# Patient Record
Sex: FEMALE | Race: WHITE | Employment: UNEMPLOYED
[De-identification: names, ages, dates, MRNs, and addresses within clinical notes are randomized per-mention and may not be internally consistent; named-entity substitution may affect disease eponyms.]

---

## 2017-06-16 PROBLEM — L72.3 SEBACEOUS CYST: Status: ACTIVE | Noted: 2017-06-16

## 2017-07-10 PROCEDURE — 88304 TISSUE EXAM BY PATHOLOGIST: CPT | Performed by: SURGERY

## 2018-07-21 NOTE — ED INITIAL ASSESSMENT (HPI)
Patient states she has an alcohol addiction. She states her last drink was about 4 ago. She states she had a bottle of wine today.  Patient denies hx of seizures with d/t in past

## 2018-07-21 NOTE — ED NOTES
Patient noted to have bottle of Blima Crofts in her bag by RN, patient belongings removed upon getting to room and placed in Smart Bag

## 2018-07-21 NOTE — ED PROVIDER NOTES
Patient Seen in: [facility] Emergency Department    History   Patient presents with:  Alcohol Intoxication (neurologic)    Stated Complaint: ETOH intoxication    HPI    Ronal Cardona is 20-year-old female who presents to the emergency department for alc Abnormal; Notable for the following:     Blood Urine Small (*)     All other components within normal limits   ETHYL ALCOHOL - Abnormal; Notable for the following:     Ethyl Alcohol 387 (*)     All other components within normal limits   CBC W/ DIFFERENTIA

## 2018-07-21 NOTE — ED NOTES
Per Clark Mendiola RN, pt getting increasingly agitated, wanting to leave. MD notified and Haldol ordered.

## 2018-07-22 NOTE — ED PROVIDER NOTES
Patient awake, alert, ambulated freely about the emergency department. Gait steady. Patient states she does not want to meet with [facility] anymore.   Patient is felt to be clinically stable and will taken over to her home, where her  is able to

## 2018-07-22 NOTE — ED NOTES
Patient speaking clearly. Gait steady with a normal base. Denies any complaints. States she will take an [address] which her  set-up for her. Dr. Adams Like aware. Patient agreed to be assisted out of ED with wheelchair once [facility]  arrives.

## 2018-07-22 NOTE — BH LEVEL OF CARE ASSESSMENT
Level of Care Assessment Note    General Questions  Why are you here?: Pt is a 40 yr old female who arrived to the ER seeking tx for alcohol abuse. Pt states she's been drinking daily for the past year.  Pt reported to the ER RN she had a bottle of wine tod yourself? (past 30 days): No  3. Have you been thinking about how you might kill yourself? (past 30 days): No  4. Have you had these thoughts and had some intention of acting on them? (past 30 days): No  6.  Have you ever done anything, started to do anythi kaylen; pt refused assessment  Appetite Symptoms:  (kaylen; pt refused assessment)  Unplanned Weight Loss:  (kaylen; pt refused assessment)  Unplanned Weight Gain:  (kaylen; pt refused assessment)  History of Eating Disorder:  (kaylen; pt refused assessment)  Active Eati days?:  (kaylen; pt refused assessment)                                              Functional Impairment  Currently Attending School:  [employer]; pt refused assessment)  Employment Status:  [employer]; ismael refused assessment)  Job Issues:  (kaylen; pt refused assessment) insight as evidenced by: pt reports a hx of alcohol abuse  Judgment: Fair  Fair/poor judgment as evidenced by: pt states she's been drinking daily for the past year  Thought Patterns  Clarity/Relevance: Coherent;Relevant to topic  Flow: Organized  Content:

## 2018-11-12 PROCEDURE — 81025 URINE PREGNANCY TEST: CPT | Performed by: OTHER

## 2018-11-12 PROCEDURE — 81003 URINALYSIS AUTO W/O SCOPE: CPT | Performed by: OTHER

## 2018-11-13 ENCOUNTER — LAB REQUISITION (OUTPATIENT)
Dept: ADMINISTRATIVE | Age: 38
End: 2018-11-13
Payer: COMMERCIAL

## 2018-11-13 DIAGNOSIS — R45.86 MOOD CHANGES: ICD-10-CM

## 2018-11-13 PROCEDURE — 80307 DRUG TEST PRSMV CHEM ANLYZR: CPT | Performed by: OTHER

## 2018-11-13 PROCEDURE — 85025 COMPLETE CBC W/AUTO DIFF WBC: CPT | Performed by: OTHER

## 2018-11-13 PROCEDURE — 80053 COMPREHEN METABOLIC PANEL: CPT | Performed by: OTHER

## 2018-11-13 PROCEDURE — 84443 ASSAY THYROID STIM HORMONE: CPT | Performed by: OTHER

## 2018-11-13 PROCEDURE — 36415 COLL VENOUS BLD VENIPUNCTURE: CPT | Performed by: OTHER

## 2019-02-15 ENCOUNTER — HOSPITAL (OUTPATIENT)
Dept: OTHER | Age: 39
End: 2019-02-15
Attending: EMERGENCY MEDICINE

## 2019-02-15 LAB
ALBUMIN SERPL-MCNC: 4.1 GM/DL (ref 3.6–5.1)
ALP SERPL-CCNC: 79 UNIT/L (ref 45–117)
ALT SERPL-CCNC: 76 UNIT/L
ANALYZER ANC (IANC): ABNORMAL
ANION GAP SERPL CALC-SCNC: 20 MMOL/L (ref 10–20)
APAP SERPL-MCNC: <2 MCG/ML (ref 10–30)
AST SERPL-CCNC: 111 UNIT/L
BASOPHILS # BLD: 0.1 THOUSAND/MCL (ref 0–0.3)
BASOPHILS NFR BLD: 2 %
BILIRUB CONJ SERPL-MCNC: 0.1 MG/DL (ref 0–0.2)
BILIRUB SERPL-MCNC: 0.4 MG/DL (ref 0.2–1)
BUN SERPL-MCNC: 4 MG/DL (ref 6–20)
BUN/CREAT SERPL: 8 (ref 7–25)
CALCIUM SERPL-MCNC: 8.7 MG/DL (ref 8.4–10.2)
CHLORIDE: 102 MMOL/L (ref 98–107)
CO2 SERPL-SCNC: 26 MMOL/L (ref 21–32)
CREAT SERPL-MCNC: 0.51 MG/DL (ref 0.51–0.95)
DIFFERENTIAL METHOD BLD: ABNORMAL
EOSINOPHIL # BLD: 0 THOUSAND/MCL (ref 0.1–0.5)
EOSINOPHIL NFR BLD: 0 %
ERYTHROCYTE [DISTWIDTH] IN BLOOD: 17.1 % (ref 11–15)
ETHANOL SERPL-MCNC: 432 MG/DL
GLUCOSE SERPL-MCNC: 107 MG/DL (ref 65–99)
HEMATOCRIT: 41.3 % (ref 36–46.5)
HGB BLD-MCNC: 14.2 GM/DL (ref 12–15.5)
IMM GRANULOCYTES # BLD AUTO: 0 THOUSAND/MCL (ref 0–0.2)
IMM GRANULOCYTES NFR BLD: 0 %
LYMPHOCYTES # BLD: 2 THOUSAND/MCL (ref 1–4.8)
LYMPHOCYTES NFR BLD: 35 %
MCH RBC QN AUTO: 32.3 PG (ref 26–34)
MCHC RBC AUTO-ENTMCNC: 34.4 GM/DL (ref 32–36.5)
MCV RBC AUTO: 94.1 FL (ref 78–100)
MONOCYTES # BLD: 0.3 THOUSAND/MCL (ref 0.3–0.9)
MONOCYTES NFR BLD: 6 %
NEUTROPHILS # BLD: 3.2 THOUSAND/MCL (ref 1.8–7.7)
NEUTROPHILS NFR BLD: 57 %
NEUTS SEG NFR BLD: ABNORMAL %
NRBC (NRBCRE): 0 /100 WBC
PLATELET # BLD: 219 THOUSAND/MCL (ref 140–450)
POTASSIUM SERPL-SCNC: 3.8 MMOL/L (ref 3.4–5.1)
PROT SERPL-MCNC: 7.9 GM/DL (ref 6.4–8.2)
RBC # BLD: 4.39 MILLION/MCL (ref 4–5.2)
SALICYLATES SERPL-MCNC: <2.8 MG/DL
SODIUM SERPL-SCNC: 144 MMOL/L (ref 135–145)
WBC # BLD: 5.7 THOUSAND/MCL (ref 4.2–11)

## 2019-02-16 LAB — ETHANOL SERPL-MCNC: 86 MG/DL

## 2019-06-01 ENCOUNTER — HOSPITAL (OUTPATIENT)
Dept: OTHER | Age: 39
End: 2019-06-01
Attending: EMERGENCY MEDICINE

## 2019-06-01 LAB — GLUCOSE BLDC GLUCOMTR-MCNC: 126 MG/DL (ref 65–99)

## 2019-06-01 PROCEDURE — 99283 EMERGENCY DEPT VISIT LOW MDM: CPT | Performed by: EMERGENCY MEDICINE

## 2019-07-21 ENCOUNTER — HOSPITAL (OUTPATIENT)
Dept: OTHER | Age: 39
End: 2019-07-21

## 2019-07-21 LAB
ANALYZER ANC (IANC): ABNORMAL
ANION GAP SERPL CALC-SCNC: 17 MMOL/L (ref 10–20)
BASOPHILS # BLD: 0.1 K/MCL (ref 0–0.3)
BASOPHILS NFR BLD: 1 %
BUN SERPL-MCNC: 7 MG/DL (ref 6–20)
BUN/CREAT SERPL: 9 (ref 7–25)
CALCIUM SERPL-MCNC: 8.4 MG/DL (ref 8.4–10.2)
CHLORIDE SERPL-SCNC: 108 MMOL/L (ref 98–107)
CO2 SERPL-SCNC: 25 MMOL/L (ref 21–32)
CREAT SERPL-MCNC: 0.74 MG/DL (ref 0.51–0.95)
DIFFERENTIAL METHOD BLD: ABNORMAL
EOSINOPHIL # BLD: 0 K/MCL (ref 0.1–0.5)
EOSINOPHIL NFR BLD: 0 %
ERYTHROCYTE [DISTWIDTH] IN BLOOD: 13.6 % (ref 11–15)
ETHANOL SERPL-MCNC: 319 MG/DL
GLUCOSE SERPL-MCNC: 101 MG/DL (ref 65–99)
HCT VFR BLD CALC: 40.6 % (ref 36–46.5)
HGB BLD-MCNC: 13.6 G/DL (ref 12–15.5)
IMM GRANULOCYTES # BLD AUTO: 0 K/MCL (ref 0–0.2)
IMM GRANULOCYTES NFR BLD: 0 %
LYMPHOCYTES # BLD: 2.4 K/MCL (ref 1–4.8)
LYMPHOCYTES NFR BLD: 26 %
MCH RBC QN AUTO: 30.1 PG (ref 26–34)
MCHC RBC AUTO-ENTMCNC: 33.5 G/DL (ref 32–36.5)
MCV RBC AUTO: 89.8 FL (ref 78–100)
MONOCYTES # BLD: 0.3 K/MCL (ref 0.3–0.9)
MONOCYTES NFR BLD: 4 %
NEUTROPHILS # BLD: 6.5 K/MCL (ref 1.8–7.7)
NEUTROPHILS NFR BLD: 69 %
NEUTS SEG NFR BLD: ABNORMAL %
NRBC (NRBCRE): 0 /100 WBC
PLATELET # BLD: 286 K/MCL (ref 140–450)
POTASSIUM SERPL-SCNC: 3.7 MMOL/L (ref 3.4–5.1)
RBC # BLD: 4.52 MIL/MCL (ref 4–5.2)
SODIUM SERPL-SCNC: 146 MMOL/L (ref 135–145)
WBC # BLD: 9.3 K/MCL (ref 4.2–11)

## 2019-07-22 PROCEDURE — 99283 EMERGENCY DEPT VISIT LOW MDM: CPT | Performed by: EMERGENCY MEDICINE

## 2019-09-21 ENCOUNTER — HOSPITAL (OUTPATIENT)
Dept: OTHER | Age: 39
End: 2019-09-21

## 2019-09-21 PROCEDURE — 99283 EMERGENCY DEPT VISIT LOW MDM: CPT | Performed by: EMERGENCY MEDICINE

## 2019-10-22 ENCOUNTER — HOSPITAL (OUTPATIENT)
Dept: OTHER | Age: 39
End: 2019-10-22

## 2019-10-22 LAB
ALBUMIN SERPL-MCNC: 4.1 G/DL (ref 3.6–5.1)
ALBUMIN/GLOB SERPL: 1 {RATIO} (ref 1–2.4)
ALP SERPL-CCNC: 104 UNITS/L (ref 45–117)
ALT SERPL-CCNC: 31 UNITS/L
AMPHETAMINES UR QL SCN>500 NG/ML: ABNORMAL
AMPHETAMINES UR QL SCN>500 NG/ML: NEGATIVE
ANALYZER ANC (IANC): ABNORMAL
ANION GAP SERPL CALC-SCNC: 23 MMOL/L (ref 10–20)
AST SERPL-CCNC: 48 UNITS/L
BARBITURATES UR QL SCN>200 NG/ML: ABNORMAL
BARBITURATES UR QL SCN>200 NG/ML: NEGATIVE
BASOPHILS # BLD: 0.2 K/MCL (ref 0–0.3)
BASOPHILS NFR BLD: 2 %
BENZODIAZ UR QL SCN>200 NG/ML: ABNORMAL
BENZODIAZ UR QL SCN>200 NG/ML: POSITIVE
BILIRUB SERPL-MCNC: 0.2 MG/DL (ref 0.2–1)
BUN SERPL-MCNC: 5 MG/DL (ref 6–20)
BUN/CREAT SERPL: 8 (ref 7–25)
BZE UR QL SCN>150 NG/ML: ABNORMAL
BZE UR QL SCN>150 NG/ML: NEGATIVE
CALCIUM SERPL-MCNC: 8.7 MG/DL (ref 8.4–10.2)
CANNABINOIDS UR QL SCN>50 NG/ML: ABNORMAL
CANNABINOIDS UR QL SCN>50 NG/ML: NEGATIVE
CHLORIDE SERPL-SCNC: 100 MMOL/L (ref 98–107)
CO2 SERPL-SCNC: 20 MMOL/L (ref 21–32)
CREAT SERPL-MCNC: 0.62 MG/DL (ref 0.51–0.95)
DIFFERENTIAL METHOD BLD: ABNORMAL
EOSINOPHIL # BLD: 0 K/MCL (ref 0.1–0.5)
EOSINOPHIL NFR BLD: 0 %
ERYTHROCYTE [DISTWIDTH] IN BLOOD: 14.6 % (ref 11–15)
ETHANOL SERPL-MCNC: 412 MG/DL
GLOBULIN SER-MCNC: 4.3 G/DL (ref 2–4)
GLUCOSE SERPL-MCNC: 121 MG/DL (ref 65–99)
HCG SERPL-ACNC: <2 MUNITS/ML
HCG SERPL-ACNC: NORMAL M[IU]/ML
HCT VFR BLD CALC: 48.5 % (ref 36–46.5)
HGB BLD-MCNC: 16.4 G/DL (ref 12–15.5)
IMM GRANULOCYTES # BLD AUTO: 0 K/MCL (ref 0–0.2)
IMM GRANULOCYTES NFR BLD: 0 %
LYMPHOCYTES # BLD: 2.8 K/MCL (ref 1–4.8)
LYMPHOCYTES NFR BLD: 26 %
MCH RBC QN AUTO: 31.5 PG (ref 26–34)
MCHC RBC AUTO-ENTMCNC: 33.8 G/DL (ref 32–36.5)
MCV RBC AUTO: 93.1 FL (ref 78–100)
METHADONE UR QL SCN>300 NG/ML: ABNORMAL
METHADONE UR QL SCN>300 NG/ML: NEGATIVE NG/ML
MONOCYTES # BLD: 0.6 K/MCL (ref 0.3–0.9)
MONOCYTES NFR BLD: 5 %
NEUTROPHILS # BLD: 7.4 K/MCL (ref 1.8–7.7)
NEUTROPHILS NFR BLD: 67 %
NEUTS SEG NFR BLD: ABNORMAL %
NRBC (NRBCRE): 0 /100 WBC
OPIATES UR QL SCN>300 NG/ML: ABNORMAL
OPIATES UR QL SCN>300 NG/ML: NEGATIVE
PCP UR QL SCN>25 NG/ML: ABNORMAL
PCP UR QL SCN>25 NG/ML: ABNORMAL
PCP UR QL SCN>25 NG/ML: NEGATIVE
PLATELET # BLD: 376 K/MCL (ref 140–450)
POTASSIUM SERPL-SCNC: 5 MMOL/L (ref 3.4–5.1)
POTASSIUM SERPL-SCNC: ABNORMAL MMOL/L
PROT SERPL-MCNC: 8.4 G/DL (ref 6.4–8.2)
RBC # BLD: 5.21 MIL/MCL (ref 4–5.2)
SODIUM SERPL-SCNC: 138 MMOL/L (ref 135–145)
WBC # BLD: 11 K/MCL (ref 4.2–11)

## 2019-10-22 PROCEDURE — 99284 EMERGENCY DEPT VISIT MOD MDM: CPT | Performed by: EMERGENCY MEDICINE

## 2020-01-07 ENCOUNTER — HOSPITAL (OUTPATIENT)
Dept: OTHER | Age: 40
End: 2020-01-07

## 2020-01-07 LAB
ALBUMIN SERPL-MCNC: 4.2 G/DL (ref 3.6–5.1)
ALBUMIN/GLOB SERPL: 1.1 {RATIO} (ref 1–2.4)
ALP SERPL-CCNC: 81 UNITS/L (ref 45–117)
ALT SERPL-CCNC: 51 UNITS/L
ANALYZER ANC (IANC): NORMAL
ANION GAP SERPL CALC-SCNC: 18 MMOL/L (ref 10–20)
AST SERPL-CCNC: 52 UNITS/L
BASOPHILS # BLD: 0.1 K/MCL (ref 0–0.3)
BASOPHILS NFR BLD: 1 %
BILIRUB SERPL-MCNC: 0.2 MG/DL (ref 0.2–1)
BUN SERPL-MCNC: 3 MG/DL (ref 6–20)
BUN/CREAT SERPL: 6 (ref 7–25)
CALCIUM SERPL-MCNC: 8.8 MG/DL (ref 8.4–10.2)
CHLORIDE SERPL-SCNC: 106 MMOL/L (ref 98–107)
CO2 SERPL-SCNC: 27 MMOL/L (ref 21–32)
CREAT SERPL-MCNC: 0.51 MG/DL (ref 0.51–0.95)
DIFFERENTIAL METHOD BLD: NORMAL
EOSINOPHIL # BLD: 0.1 K/MCL (ref 0.1–0.5)
EOSINOPHIL NFR BLD: 1 %
ERYTHROCYTE [DISTWIDTH] IN BLOOD: 14.8 % (ref 11–15)
ETHANOL SERPL-MCNC: 392 MG/DL
GLOBULIN SER-MCNC: 3.9 G/DL (ref 2–4)
GLUCOSE BLDC GLUCOMTR-MCNC: 106 MG/DL (ref 70–99)
GLUCOSE BLDC GLUCOMTR-MCNC: 248 MG/DL (ref 70–99)
GLUCOSE BLDC GLUCOMTR-MCNC: 85 MG/DL (ref 70–99)
GLUCOSE BLDC GLUCOMTR-MCNC: 85 MG/DL (ref 70–99)
GLUCOSE BLDC GLUCOMTR-MCNC: 89 MG/DL (ref 70–99)
GLUCOSE BLDC GLUCOMTR-MCNC: ABNORMAL MG/DL
GLUCOSE SERPL-MCNC: 129 MG/DL (ref 65–99)
HCT VFR BLD CALC: 41.2 % (ref 36–46.5)
HGB BLD-MCNC: 13.9 G/DL (ref 12–15.5)
IMM GRANULOCYTES # BLD AUTO: 0 K/MCL (ref 0–0.2)
IMM GRANULOCYTES NFR BLD: 0 %
LYMPHOCYTES # BLD: 2.1 K/MCL (ref 1–4.8)
LYMPHOCYTES NFR BLD: 35 %
MAGNESIUM SERPL-MCNC: 2.3 MG/DL (ref 1.7–2.4)
MCH RBC QN AUTO: 31.3 PG (ref 26–34)
MCHC RBC AUTO-ENTMCNC: 33.7 G/DL (ref 32–36.5)
MCV RBC AUTO: 92.8 FL (ref 78–100)
MONOCYTES # BLD: 0.3 K/MCL (ref 0.3–0.9)
MONOCYTES NFR BLD: 5 %
NEUTROPHILS # BLD: 3.5 K/MCL (ref 1.8–7.7)
NEUTROPHILS NFR BLD: 58 %
NEUTS SEG NFR BLD: NORMAL %
NRBC (NRBCRE): 0 /100 WBC
PLATELET # BLD: 274 K/MCL (ref 140–450)
POTASSIUM SERPL-SCNC: 4.2 MMOL/L (ref 3.4–5.1)
PROT SERPL-MCNC: 8.1 G/DL (ref 6.4–8.2)
RBC # BLD: 4.44 MIL/MCL (ref 4–5.2)
SODIUM SERPL-SCNC: 147 MMOL/L (ref 135–145)
WBC # BLD: 6 K/MCL (ref 4.2–11)

## 2020-01-07 PROCEDURE — 99284 EMERGENCY DEPT VISIT MOD MDM: CPT | Performed by: EMERGENCY MEDICINE

## 2020-01-08 ENCOUNTER — HOSPITAL (OUTPATIENT)
Dept: OTHER | Age: 40
End: 2020-01-08

## 2020-01-08 LAB
ALBUMIN SERPL-MCNC: 4.1 G/DL (ref 3.6–5.1)
ALBUMIN/GLOB SERPL: 1.1 {RATIO} (ref 1–2.4)
ALP SERPL-CCNC: 85 UNITS/L (ref 45–117)
ALT SERPL-CCNC: 48 UNITS/L
AMMONIA PLAS-SCNC: <10 MCMOL/L
ANALYZER ANC (IANC): ABNORMAL
ANION GAP SERPL CALC-SCNC: 21 MMOL/L (ref 10–20)
APAP SERPL-MCNC: <2 MCG/ML (ref 10–30)
AST SERPL-CCNC: 42 UNITS/L
BASE DEFICIT BLDA-SCNC: ABNORMAL MMOL/L
BASE EXCESS BLDA CALC-SCNC: 0 MMOL/L (ref 0–3)
BASOPHILS # BLD: 0.1 K/MCL (ref 0–0.3)
BASOPHILS NFR BLD: 1 %
BDY SITE: ABNORMAL
BILIRUB SERPL-MCNC: 0.2 MG/DL (ref 0.2–1)
BODY TEMPERATURE: 37 DEGREES
BUN SERPL-MCNC: 3 MG/DL (ref 6–20)
BUN/CREAT SERPL: 5 (ref 7–25)
CA-I BLD ISE-SCNC: 1.1 MMOL/L (ref 1.15–1.29)
CA-I BLDA-SCNC: 20 % (ref 15–23)
CALCIUM SERPL-MCNC: 9.1 MG/DL (ref 8.4–10.2)
CHLORIDE BLD-SCNC: 105 MMOL/L (ref 98–107)
CHLORIDE SERPL-SCNC: 100 MMOL/L (ref 98–107)
CO2 SERPL-SCNC: 24 MMOL/L (ref 21–32)
COHGB MFR BLD: 0.9 %
CONDITION-RC: ABNORMAL
CONDITION: ABNORMAL
CREAT SERPL-MCNC: 0.56 MG/DL (ref 0.51–0.95)
DIFFERENTIAL METHOD BLD: ABNORMAL
EOSINOPHIL # BLD: 0 K/MCL (ref 0.1–0.5)
EOSINOPHIL NFR BLD: 0 %
ERYTHROCYTE [DISTWIDTH] IN BLOOD: 15 % (ref 11–15)
ETHANOL SERPL-MCNC: 441 MG/DL
GLOBULIN SER-MCNC: 3.8 G/DL (ref 2–4)
GLUCOSE BLD-MCNC: 122 MG/DL (ref 65–99)
GLUCOSE BLDC GLUCOMTR-MCNC: 124 MG/DL (ref 70–99)
GLUCOSE SERPL-MCNC: 133 MG/DL (ref 65–99)
HCG SERPL QL: NEGATIVE
HCO3 BLDA-SCNC: 25 MMOL/L (ref 22–28)
HCT VFR BLD CALC: 42.9 % (ref 36–46.5)
HGB BLD-MCNC: 14.1 G/DL (ref 12–15.5)
HGB BLD-MCNC: 14.3 G/DL (ref 12–15.5)
HOROWITZ INDEX BLD+IHG-RTO: 28 %
IMM GRANULOCYTES # BLD AUTO: 0 K/MCL (ref 0–0.2)
IMM GRANULOCYTES NFR BLD: 0 %
LACTATE BLDA-MCNC: 5.2 MMOL/L
LACTATE BLDV-SCNC: 3.9 MMOL/L (ref 0–2)
LACTATE BLDV-SCNC: 5.9 MMOL/L (ref 0–2)
LACTATE BLDV-SCNC: ABNORMAL MMOL/L
LYMPHOCYTES # BLD: 1.8 K/MCL (ref 1–4.8)
LYMPHOCYTES NFR BLD: 26 %
MCH RBC QN AUTO: 31.3 PG (ref 26–34)
MCHC RBC AUTO-ENTMCNC: 33.3 G/DL (ref 32–36.5)
MCV RBC AUTO: 93.9 FL (ref 78–100)
METHGB MFR BLD: 0.6 %
MONOCYTES # BLD: 0.5 K/MCL (ref 0.3–0.9)
MONOCYTES NFR BLD: 7 %
NEUTROPHILS # BLD: 4.6 K/MCL (ref 1.8–7.7)
NEUTROPHILS NFR BLD: 66 %
NEUTS SEG NFR BLD: ABNORMAL %
NRBC (NRBCRE): 0 /100 WBC
OXYHGB MFR BLD: 97.7 % (ref 94–98)
PAO2 / FIO2 RATIO (RPFR): 635 (ref 300–500)
PCO2 BLDA: 42 MM HG (ref 32–45)
PH BLDA: 7.39 UNITS (ref 7.35–7.45)
PLATELET # BLD: 280 K/MCL (ref 140–450)
PO2 BLDA: 178 MM HG (ref 83–108)
POTASSIUM BLD-SCNC: 4.2 MMOL/L (ref 3.4–5.1)
POTASSIUM SERPL-SCNC: 4.1 MMOL/L (ref 3.4–5.1)
PROT SERPL-MCNC: 7.9 G/DL (ref 6.4–8.2)
RBC # BLD: 4.57 MIL/MCL (ref 4–5.2)
SALICYLATES SERPL-MCNC: <2.8 MG/DL
SAO2 % BLDA: 99 % (ref 95–99)
SODIUM BLD-SCNC: 147 MMOL/L (ref 135–145)
SODIUM SERPL-SCNC: 141 MMOL/L (ref 135–145)
WBC # BLD: 7.1 K/MCL (ref 4.2–11)

## 2020-01-08 PROCEDURE — 99285 EMERGENCY DEPT VISIT HI MDM: CPT | Performed by: EMERGENCY MEDICINE

## 2020-01-08 PROCEDURE — 99220 INITIAL OBSERVATION CARE,LEVL III: CPT | Performed by: INTERNAL MEDICINE

## 2020-01-09 LAB
ALBUMIN SERPL-MCNC: 3.2 G/DL (ref 3.6–5.1)
ALBUMIN/GLOB SERPL: 1 {RATIO} (ref 1–2.4)
ALP SERPL-CCNC: 69 UNITS/L (ref 45–117)
ALT SERPL-CCNC: 37 UNITS/L
AMORPH SED URNS QL MICRO: ABNORMAL
AMPHETAMINES UR QL SCN>500 NG/ML: ABNORMAL
AMPHETAMINES UR QL SCN>500 NG/ML: NEGATIVE
ANALYZER ANC (IANC): ABNORMAL
ANION GAP SERPL CALC-SCNC: 15 MMOL/L (ref 10–20)
APPEARANCE UR: ABNORMAL
AST SERPL-CCNC: 30 UNITS/L
BACTERIA #/AREA URNS HPF: ABNORMAL /HPF
BARBITURATES UR QL SCN>200 NG/ML: ABNORMAL
BARBITURATES UR QL SCN>200 NG/ML: NEGATIVE
BASOPHILS # BLD: 0.1 K/MCL (ref 0–0.3)
BASOPHILS NFR BLD: 1 %
BENZODIAZ UR QL SCN>200 NG/ML: ABNORMAL
BENZODIAZ UR QL SCN>200 NG/ML: POSITIVE
BILIRUB SERPL-MCNC: 0.5 MG/DL (ref 0.2–1)
BILIRUB UR QL: NEGATIVE
BUN SERPL-MCNC: 3 MG/DL (ref 6–20)
BUN/CREAT SERPL: 7 (ref 7–25)
BZE UR QL SCN>150 NG/ML: ABNORMAL
BZE UR QL SCN>150 NG/ML: NEGATIVE
CALCIUM SERPL-MCNC: 8.5 MG/DL (ref 8.4–10.2)
CANNABINOIDS UR QL SCN>50 NG/ML: ABNORMAL
CANNABINOIDS UR QL SCN>50 NG/ML: NEGATIVE
CAOX CRY URNS QL MICRO: ABNORMAL
CHLORIDE SERPL-SCNC: 103 MMOL/L (ref 98–107)
CO2 SERPL-SCNC: 26 MMOL/L (ref 21–32)
COLOR UR: YELLOW
CREAT SERPL-MCNC: 0.46 MG/DL (ref 0.51–0.95)
DIFFERENTIAL METHOD BLD: ABNORMAL
EOSINOPHIL # BLD: 0.1 K/MCL (ref 0.1–0.5)
EOSINOPHIL NFR BLD: 1 %
EPITH CASTS #/AREA URNS LPF: ABNORMAL /[LPF]
ERYTHROCYTE [DISTWIDTH] IN BLOOD: 14.9 % (ref 11–15)
FATTY CASTS #/AREA URNS LPF: ABNORMAL /[LPF]
GLOBULIN SER-MCNC: 3.1 G/DL (ref 2–4)
GLUCOSE SERPL-MCNC: 77 MG/DL (ref 65–99)
GLUCOSE UR-MCNC: NEGATIVE MG/DL
GRAN CASTS #/AREA URNS LPF: ABNORMAL /[LPF]
HCT VFR BLD CALC: 35.4 % (ref 36–46.5)
HGB BLD-MCNC: 11.9 G/DL (ref 12–15.5)
HGB UR QL: ABNORMAL
HYALINE CASTS #/AREA URNS LPF: ABNORMAL /LPF (ref 0–5)
IMM GRANULOCYTES # BLD AUTO: 0 K/MCL (ref 0–0.2)
IMM GRANULOCYTES NFR BLD: 0 %
KETONES UR-MCNC: NEGATIVE MG/DL
LACTATE BLDV-SCNC: 1.1 MMOL/L (ref 0–2)
LEUKOCYTE ESTERASE UR QL STRIP: ABNORMAL
LEUKOCYTE ESTERASE UR QL STRIP: ABNORMAL
LYMPHOCYTES # BLD: 2.2 K/MCL (ref 1–4.8)
LYMPHOCYTES NFR BLD: 31 %
MAGNESIUM SERPL-MCNC: 1.7 MG/DL (ref 1.7–2.4)
MCH RBC QN AUTO: 31.6 PG (ref 26–34)
MCHC RBC AUTO-ENTMCNC: 33.6 G/DL (ref 32–36.5)
MCV RBC AUTO: 93.9 FL (ref 78–100)
METHADONE UR QL SCN>300 NG/ML: ABNORMAL
METHADONE UR QL SCN>300 NG/ML: NEGATIVE NG/ML
MICROSCOPIC (MT): ABNORMAL
MIXED CELL CASTS #/AREA URNS LPF: ABNORMAL /[LPF]
MONOCYTES # BLD: 0.5 K/MCL (ref 0.3–0.9)
MONOCYTES NFR BLD: 7 %
MUCOUS THREADS URNS QL MICRO: PRESENT
NEUTROPHILS # BLD: 4.3 K/MCL (ref 1.8–7.7)
NEUTROPHILS NFR BLD: 60 %
NEUTS SEG NFR BLD: ABNORMAL %
NITRITE UR QL: ABNORMAL
NITRITE UR QL: POSITIVE
NRBC (NRBCRE): 0 /100 WBC
OPIATES UR QL SCN>300 NG/ML: ABNORMAL
OPIATES UR QL SCN>300 NG/ML: NEGATIVE
PCP UR QL SCN>25 NG/ML: ABNORMAL
PCP UR QL SCN>25 NG/ML: ABNORMAL
PCP UR QL SCN>25 NG/ML: NEGATIVE
PH UR: 6 UNITS (ref 5–7)
PLATELET # BLD: 200 K/MCL (ref 140–450)
POTASSIUM SERPL-SCNC: 3.7 MMOL/L (ref 3.4–5.1)
PROT SERPL-MCNC: 6.3 G/DL (ref 6.4–8.2)
PROT UR QL: NEGATIVE MG/DL
RBC # BLD: 3.77 MIL/MCL (ref 4–5.2)
RBC #/AREA URNS HPF: ABNORMAL /HPF (ref 0–2)
RBC CASTS #/AREA URNS LPF: ABNORMAL /[LPF]
RENAL EPI CELLS #/AREA URNS HPF: ABNORMAL /[HPF]
SODIUM SERPL-SCNC: 140 MMOL/L (ref 135–145)
SP GR UR: 1.01 (ref 1–1.03)
SPECIMEN SOURCE: ABNORMAL
SPERM URNS QL MICRO: ABNORMAL
SQUAMOUS #/AREA URNS HPF: ABNORMAL /HPF (ref 0–5)
T VAGINALIS URNS QL MICRO: ABNORMAL
TRI-PHOS CRY URNS QL MICRO: ABNORMAL
URATE CRY URNS QL MICRO: ABNORMAL
URNS CMNT MICRO: ABNORMAL
UROBILINOGEN UR QL: 0.2 MG/DL (ref 0–1)
WAXY CASTS #/AREA URNS LPF: ABNORMAL /[LPF]
WBC # BLD: 7.1 K/MCL (ref 4.2–11)
WBC #/AREA URNS HPF: >100 /HPF (ref 0–5)
WBC #/AREA URNS HPF: ABNORMAL /[HPF]
WBC CASTS #/AREA URNS LPF: ABNORMAL /[LPF]
YEAST HYPHAE URNS QL MICRO: ABNORMAL
YEAST URNS QL MICRO: ABNORMAL

## 2020-01-09 PROCEDURE — 99226 SUBSEQUENT OBSERVATION CARE III: CPT | Performed by: INTERNAL MEDICINE

## 2020-01-10 LAB
ANALYZER ANC (IANC): ABNORMAL
ANION GAP SERPL CALC-SCNC: 10 MMOL/L (ref 10–20)
BASOPHILS # BLD: 0.1 K/MCL (ref 0–0.3)
BASOPHILS NFR BLD: 1 %
BUN SERPL-MCNC: 4 MG/DL (ref 6–20)
BUN/CREAT SERPL: 8 (ref 7–25)
CALCIUM SERPL-MCNC: 8.7 MG/DL (ref 8.4–10.2)
CHLORIDE SERPL-SCNC: 103 MMOL/L (ref 98–107)
CO2 SERPL-SCNC: 28 MMOL/L (ref 21–32)
CREAT SERPL-MCNC: 0.5 MG/DL (ref 0.51–0.95)
DIFFERENTIAL METHOD BLD: ABNORMAL
EOSINOPHIL # BLD: 0.2 K/MCL (ref 0.1–0.5)
EOSINOPHIL NFR BLD: 2 %
ERYTHROCYTE [DISTWIDTH] IN BLOOD: 14.4 % (ref 11–15)
GLUCOSE SERPL-MCNC: 108 MG/DL (ref 65–99)
HCT VFR BLD CALC: 34.4 % (ref 36–46.5)
HGB BLD-MCNC: 11.4 G/DL (ref 12–15.5)
IMM GRANULOCYTES # BLD AUTO: 0 K/MCL (ref 0–0.2)
IMM GRANULOCYTES NFR BLD: 0 %
LEVETIRACETAM SERPL-MCNC: 6.4 MCG/ML (ref 6–46)
LEVETIRACETAM SERPL-MCNC: NORMAL UG/ML
LYMPHOCYTES # BLD: 1.4 K/MCL (ref 1–4.8)
LYMPHOCYTES NFR BLD: 18 %
MCH RBC QN AUTO: 31.7 PG (ref 26–34)
MCHC RBC AUTO-ENTMCNC: 33.1 G/DL (ref 32–36.5)
MCV RBC AUTO: 95.6 FL (ref 78–100)
MONOCYTES # BLD: 0.6 K/MCL (ref 0.3–0.9)
MONOCYTES NFR BLD: 7 %
NEUTROPHILS # BLD: 5.6 K/MCL (ref 1.8–7.7)
NEUTROPHILS NFR BLD: 72 %
NEUTS SEG NFR BLD: ABNORMAL %
NRBC (NRBCRE): 0 /100 WBC
PLATELET # BLD: 188 K/MCL (ref 140–450)
POTASSIUM SERPL-SCNC: 3.4 MMOL/L (ref 3.4–5.1)
RBC # BLD: 3.6 MIL/MCL (ref 4–5.2)
SODIUM SERPL-SCNC: 138 MMOL/L (ref 135–145)
WBC # BLD: 7.8 K/MCL (ref 4.2–11)

## 2020-01-10 PROCEDURE — 99217 OBSERVATION CARE DISCHARGE: CPT | Performed by: INTERNAL MEDICINE

## 2020-01-12 LAB
BACTERIA UR CULT: ABNORMAL
ORGANISM: ABNORMAL
ORGANISM: ABNORMAL

## 2020-05-11 ENCOUNTER — HOSPITAL (OUTPATIENT)
Dept: OTHER | Age: 40
End: 2020-05-11

## 2020-05-11 LAB
GLUCOSE BLDC GLUCOMTR-MCNC: 84 MG/DL (ref 70–99)
HCG POINT OF CARE (5HGRST): NEGATIVE

## 2020-05-11 PROCEDURE — 99283 EMERGENCY DEPT VISIT LOW MDM: CPT | Performed by: EMERGENCY MEDICINE

## 2020-05-13 ENCOUNTER — HOSPITAL (OUTPATIENT)
Dept: OTHER | Age: 40
End: 2020-05-13

## 2020-05-13 LAB — ETHANOL SERPL-MCNC: 575 MG/DL

## 2020-05-13 PROCEDURE — 99284 EMERGENCY DEPT VISIT MOD MDM: CPT | Performed by: EMERGENCY MEDICINE

## 2020-05-14 LAB
ALBUMIN SERPL-MCNC: 3.2 G/DL (ref 3.6–5.1)
ALBUMIN/GLOB SERPL: 0.9 {RATIO} (ref 1–2.4)
ALP SERPL-CCNC: 58 UNITS/L (ref 45–117)
ALT SERPL-CCNC: 29 UNITS/L
ANALYZER ANC (IANC): ABNORMAL
ANION GAP SERPL CALC-SCNC: 13 MMOL/L (ref 10–20)
AST SERPL-CCNC: 24 UNITS/L
BASOPHILS # BLD: 0.1 K/MCL (ref 0–0.3)
BASOPHILS NFR BLD: 1 %
BILIRUB SERPL-MCNC: 0.1 MG/DL (ref 0.2–1)
BUN SERPL-MCNC: 5 MG/DL (ref 6–20)
BUN/CREAT SERPL: 13 (ref 7–25)
CALCIUM SERPL-MCNC: 7.5 MG/DL (ref 8.4–10.2)
CHLORIDE SERPL-SCNC: 108 MMOL/L (ref 98–107)
CK SERPL-CCNC: 59 UNITS/L (ref 26–192)
CO2 SERPL-SCNC: 29 MMOL/L (ref 21–32)
CREAT SERPL-MCNC: 0.4 MG/DL (ref 0.51–0.95)
CRP SERPL-MCNC: <0.3 MG/DL
DIFFERENTIAL METHOD BLD: ABNORMAL
EOSINOPHIL # BLD: 0.1 K/MCL (ref 0.1–0.5)
EOSINOPHIL NFR BLD: 1 %
ERYTHROCYTE [DISTWIDTH] IN BLOOD: 16.5 % (ref 11–15)
FERRITIN SERPL-MCNC: 172 NG/ML (ref 8–252)
GLOBULIN SER-MCNC: 3.5 G/DL (ref 2–4)
GLUCOSE SERPL-MCNC: 101 MG/DL (ref 65–99)
HCT VFR BLD CALC: 37.7 % (ref 36–46.5)
HGB BLD-MCNC: 12.5 G/DL (ref 12–15.5)
IMM GRANULOCYTES # BLD AUTO: 0 K/MCL (ref 0–0.2)
IMM GRANULOCYTES NFR BLD: 0 %
LDH SERPL L TO P-CCNC: 166 UNITS/L (ref 82–240)
LYMPHOCYTES # BLD: 2.3 K/MCL (ref 1–4.8)
LYMPHOCYTES NFR BLD: 41 %
MAGNESIUM SERPL-MCNC: 1.8 MG/DL (ref 1.7–2.4)
MCH RBC QN AUTO: 31.9 PG (ref 26–34)
MCHC RBC AUTO-ENTMCNC: 33.2 G/DL (ref 32–36.5)
MCV RBC AUTO: 96.2 FL (ref 78–100)
MONOCYTES # BLD: 0.2 K/MCL (ref 0.3–0.9)
MONOCYTES NFR BLD: 4 %
NEUTROPHILS # BLD: 2.9 K/MCL (ref 1.8–7.7)
NEUTROPHILS NFR BLD: 53 %
NEUTS SEG NFR BLD: ABNORMAL %
NRBC (NRBCRE): 0 /100 WBC
PLATELET # BLD: 176 K/MCL (ref 140–450)
POTASSIUM SERPL-SCNC: 3.9 MMOL/L (ref 3.4–5.1)
PROT SERPL-MCNC: 6.7 G/DL (ref 6.4–8.2)
RBC # BLD: 3.92 MIL/MCL (ref 4–5.2)
SODIUM SERPL-SCNC: 146 MMOL/L (ref 135–145)
TROPONIN I SERPL HS-MCNC: <0.02 NG/ML
WBC # BLD: 5.5 K/MCL (ref 4.2–11)

## 2020-05-31 ENCOUNTER — HOSPITAL (OUTPATIENT)
Dept: OTHER | Age: 40
End: 2020-05-31

## 2020-05-31 LAB
ALBUMIN SERPL-MCNC: 4.1 G/DL (ref 3.6–5.1)
ALBUMIN/GLOB SERPL: 1.1 {RATIO} (ref 1–2.4)
ALP SERPL-CCNC: 65 UNITS/L (ref 45–117)
ALT SERPL-CCNC: 54 UNITS/L
ANALYZER ANC (IANC): ABNORMAL
ANION GAP SERPL CALC-SCNC: 29 MMOL/L (ref 10–20)
AST SERPL-CCNC: 78 UNITS/L
BASOPHILS # BLD: 0.2 K/MCL (ref 0–0.3)
BASOPHILS NFR BLD: 1 %
BILIRUB SERPL-MCNC: 0.5 MG/DL (ref 0.2–1)
BUN SERPL-MCNC: 3 MG/DL (ref 6–20)
BUN/CREAT SERPL: 6 (ref 7–25)
CALCIUM SERPL-MCNC: 8.1 MG/DL (ref 8.4–10.2)
CHLORIDE SERPL-SCNC: 93 MMOL/L (ref 98–107)
CO2 SERPL-SCNC: 16 MMOL/L (ref 21–32)
CREAT SERPL-MCNC: 0.48 MG/DL (ref 0.51–0.95)
DIFFERENTIAL METHOD BLD: ABNORMAL
EOSINOPHIL # BLD: 0 K/MCL (ref 0.1–0.5)
EOSINOPHIL NFR BLD: 0 %
ERYTHROCYTE [DISTWIDTH] IN BLOOD: 15.7 % (ref 11–15)
ETHANOL SERPL-MCNC: 24 MG/DL
GLOBULIN SER-MCNC: 3.9 G/DL (ref 2–4)
GLUCOSE SERPL-MCNC: 153 MG/DL (ref 65–99)
HCT VFR BLD CALC: 41.5 % (ref 36–46.5)
HGB BLD-MCNC: 14 G/DL (ref 12–15.5)
IMM GRANULOCYTES # BLD AUTO: 0.1 K/MCL (ref 0–0.2)
IMM GRANULOCYTES NFR BLD: 1 %
LIPASE SERPL-CCNC: <50 UNITS/L (ref 73–393)
LYMPHOCYTES # BLD: 1.9 K/MCL (ref 1–4.8)
LYMPHOCYTES NFR BLD: 17 %
MCH RBC QN AUTO: 33.2 PG (ref 26–34)
MCHC RBC AUTO-ENTMCNC: 33.7 G/DL (ref 32–36.5)
MCV RBC AUTO: 98.3 FL (ref 78–100)
MONOCYTES # BLD: 0.7 K/MCL (ref 0.3–0.9)
MONOCYTES NFR BLD: 6 %
NEUTROPHILS # BLD: 8.3 K/MCL (ref 1.8–7.7)
NEUTROPHILS NFR BLD: 75 %
NEUTS SEG NFR BLD: ABNORMAL %
NRBC (NRBCRE): 0 /100 WBC
PLATELET # BLD: 197 K/MCL (ref 140–450)
POTASSIUM SERPL-SCNC: 3.2 MMOL/L (ref 3.4–5.1)
PROT SERPL-MCNC: 8 G/DL (ref 6.4–8.2)
RBC # BLD: 4.22 MIL/MCL (ref 4–5.2)
SODIUM SERPL-SCNC: 135 MMOL/L (ref 135–145)
WBC # BLD: 11 K/MCL (ref 4.2–11)

## 2020-05-31 PROCEDURE — 99284 EMERGENCY DEPT VISIT MOD MDM: CPT | Performed by: EMERGENCY MEDICINE

## 2020-07-04 ENCOUNTER — HOSPITAL (OUTPATIENT)
Dept: OTHER | Age: 40
End: 2020-07-04

## 2020-07-04 PROCEDURE — 99283 EMERGENCY DEPT VISIT LOW MDM: CPT | Performed by: EMERGENCY MEDICINE

## 2020-08-23 ENCOUNTER — HOSPITAL (OUTPATIENT)
Dept: OTHER | Age: 40
End: 2020-08-23

## 2020-08-23 LAB
ALBUMIN SERPL-MCNC: 3.5 G/DL (ref 3.6–5.1)
ALBUMIN/GLOB SERPL: 0.9 {RATIO} (ref 1–2.4)
ALP SERPL-CCNC: 59 UNITS/L (ref 45–117)
ALT SERPL-CCNC: 45 UNITS/L
ANALYZER ANC (IANC): ABNORMAL
ANION GAP SERPL CALC-SCNC: 18 MMOL/L (ref 10–20)
AST SERPL-CCNC: 46 UNITS/L
BASOPHILS # BLD: 0.2 K/MCL (ref 0–0.3)
BASOPHILS NFR BLD: 2 %
BILIRUB SERPL-MCNC: 0.2 MG/DL (ref 0.2–1)
BUN SERPL-MCNC: 12 MG/DL (ref 6–20)
BUN/CREAT SERPL: 19 (ref 7–25)
CALCIUM SERPL-MCNC: 8.6 MG/DL (ref 8.4–10.2)
CHLORIDE SERPL-SCNC: 103 MMOL/L (ref 98–107)
CO2 SERPL-SCNC: 26 MMOL/L (ref 21–32)
CREAT SERPL-MCNC: 0.64 MG/DL (ref 0.51–0.95)
DIFFERENTIAL METHOD BLD: ABNORMAL
EOSINOPHIL # BLD: 0.1 K/MCL (ref 0.1–0.5)
EOSINOPHIL NFR BLD: 1 %
ERYTHROCYTE [DISTWIDTH] IN BLOOD: 14.1 % (ref 11–15)
ETHANOL SERPL-MCNC: 462 MG/DL
GLOBULIN SER-MCNC: 3.7 G/DL (ref 2–4)
GLUCOSE SERPL-MCNC: 108 MG/DL (ref 65–99)
HCG SERPL-ACNC: <2 MUNITS/ML
HCG SERPL-ACNC: NORMAL M[IU]/ML
HCT VFR BLD CALC: 40.1 % (ref 36–46.5)
HGB BLD-MCNC: 13.5 G/DL (ref 12–15.5)
IMM GRANULOCYTES # BLD AUTO: 0 K/MCL (ref 0–0.2)
IMM GRANULOCYTES NFR BLD: 0 %
LYMPHOCYTES # BLD: 2.5 K/MCL (ref 1–4.8)
LYMPHOCYTES NFR BLD: 35 %
MCH RBC QN AUTO: 33.9 PG (ref 26–34)
MCHC RBC AUTO-ENTMCNC: 33.7 G/DL (ref 32–36.5)
MCV RBC AUTO: 100.8 FL (ref 78–100)
MONOCYTES # BLD: 0.5 K/MCL (ref 0.3–0.9)
MONOCYTES NFR BLD: 7 %
NEUTROPHILS # BLD: 4 K/MCL (ref 1.8–7.7)
NEUTROPHILS NFR BLD: 55 %
NEUTS SEG NFR BLD: ABNORMAL %
NRBC BLD MANUAL-RTO: 0 /100 WBC
PLATELET # BLD: 291 K/MCL (ref 140–450)
POTASSIUM SERPL-SCNC: 4 MMOL/L (ref 3.4–5.1)
PROT SERPL-MCNC: 7.2 G/DL (ref 6.4–8.2)
RBC # BLD: 3.98 MIL/MCL (ref 4–5.2)
SODIUM SERPL-SCNC: 143 MMOL/L (ref 135–145)
WBC # BLD: 7.3 K/MCL (ref 4.2–11)

## 2020-08-23 PROCEDURE — 99284 EMERGENCY DEPT VISIT MOD MDM: CPT | Performed by: EMERGENCY MEDICINE

## 2020-10-01 ENCOUNTER — HOSPITAL (OUTPATIENT)
Dept: OTHER | Age: 40
End: 2020-10-01

## 2020-10-01 LAB
ALBUMIN SERPL-MCNC: 4.3 G/DL (ref 3.6–5.1)
ALBUMIN/GLOB SERPL: 0.9 {RATIO} (ref 1–2.4)
ALP SERPL-CCNC: 97 UNITS/L (ref 45–117)
ALT SERPL-CCNC: 144 UNITS/L
AMORPH SED URNS QL MICRO: ABNORMAL
ANALYZER ANC (IANC): ABNORMAL
ANION GAP SERPL CALC-SCNC: 31 MMOL/L (ref 10–20)
APPEARANCE UR: ABNORMAL
AST SERPL-CCNC: 233 UNITS/L
BACTERIA #/AREA URNS HPF: ABNORMAL /HPF
BASE DEFICIT BLDV-SCNC: 2 MMOL/L (ref 0–2)
BASE EXCESS BLDV CALC-SCNC: ABNORMAL MMOL/L
BASOPHILS # BLD: 0.1 K/MCL (ref 0–0.3)
BASOPHILS NFR BLD: 1 %
BDY SITE: ABNORMAL
BILIRUB SERPL-MCNC: 1.3 MG/DL (ref 0.2–1)
BILIRUB UR QL STRIP: NEGATIVE
BODY TEMPERATURE: 37 DEGREES
BUN SERPL-MCNC: 4 MG/DL (ref 6–20)
BUN/CREAT SERPL: 7 (ref 7–25)
CA-I BLD ISE-SCNC: 1.1 MMOL/L (ref 1.15–1.29)
CALCIUM SERPL-MCNC: 8.8 MG/DL (ref 8.4–10.2)
CAOX CRY URNS QL MICRO: ABNORMAL
CHLORIDE BLD-SCNC: 94 MMOL/L (ref 98–107)
CHLORIDE SERPL-SCNC: 91 MMOL/L (ref 98–107)
CO2 SERPL-SCNC: 15 MMOL/L (ref 21–32)
COHGB MFR BLD: 1.4 %
COLOR UR: YELLOW
CONDITION-RC: ABNORMAL
CONDITION: ABNORMAL
CREAT SERPL-MCNC: 0.54 MG/DL (ref 0.51–0.95)
CRP SERPL-MCNC: <0.3 MG/DL
DIFFERENTIAL METHOD BLD: ABNORMAL
EOSINOPHIL # BLD: 0 K/MCL (ref 0.1–0.5)
EOSINOPHIL NFR BLD: 0 %
EPITH CASTS #/AREA URNS LPF: ABNORMAL /[LPF]
ERYTHROCYTE [DISTWIDTH] IN BLOOD: 12.8 % (ref 11–15)
ETHANOL SERPL-MCNC: NORMAL MG/DL
FATTY CASTS #/AREA URNS LPF: ABNORMAL /[LPF]
GLOBULIN SER-MCNC: 4.6 G/DL (ref 2–4)
GLUCOSE BLD-MCNC: 146 MG/DL (ref 65–99)
GLUCOSE SERPL-MCNC: 200 MG/DL (ref 65–99)
GLUCOSE UR STRIP-MCNC: 150 MG/DL
GRAN CASTS #/AREA URNS LPF: ABNORMAL /[LPF]
HCG UR QL: NEGATIVE
HCO3 BLDV-SCNC: 24 MMOL/L (ref 22–28)
HCT VFR BLD CALC: 43.4 % (ref 36–46.5)
HGB BLD-MCNC: 14.8 G/DL (ref 12–15.5)
HGB BLD-MCNC: 14.8 G/DL (ref 12–15.5)
HGB UR QL STRIP: ABNORMAL
HOROWITZ INDEX BLD+IHG-RTO: 21 %
HYALINE CASTS #/AREA URNS LPF: ABNORMAL /[LPF]
IMM GRANULOCYTES # BLD AUTO: 0 K/MCL (ref 0–0.2)
IMM GRANULOCYTES NFR BLD: 0 %
KETONES UR STRIP-MCNC: 80 MG/DL
LACTATE BLDV-MCNC: 2 MMOL/L
LEUKOCYTE ESTERASE UR QL STRIP: NEGATIVE
LYMPHOCYTES # BLD: 0.4 K/MCL (ref 1–4.8)
LYMPHOCYTES NFR BLD: 5 %
MAGNESIUM SERPL-MCNC: 1.9 MG/DL (ref 1.7–2.4)
MCH RBC QN AUTO: 34.3 PG (ref 26–34)
MCHC RBC AUTO-ENTMCNC: 34.1 G/DL (ref 32–36.5)
MCV RBC AUTO: 100.5 FL (ref 78–100)
METHGB MFR BLD: 0.8 %
MIXED CELL CASTS #/AREA URNS LPF: ABNORMAL /[LPF]
MONOCYTES # BLD: 0.6 K/MCL (ref 0.3–0.9)
MONOCYTES NFR BLD: 6 %
MUCOUS THREADS URNS QL MICRO: ABNORMAL
NEUTROPHILS # BLD: 7.8 K/MCL (ref 1.8–7.7)
NEUTROPHILS NFR BLD: 88 %
NEUTS SEG NFR BLD: ABNORMAL %
NITRITE UR QL STRIP: NEGATIVE
NRBC BLD MANUAL-RTO: 0 /100 WBC
O2 CONTENT VEN-RC: 11 %
OXYHGB MFR BLD: 54.1 % (ref 94–98)
PCO2 BLDV: 43 MM HG (ref 38–51)
PH BLDV: 7.35 UNITS (ref 7.35–7.45)
PH UR STRIP: 5 UNITS (ref 5–7)
PLATELET # BLD: 160 K/MCL (ref 140–450)
PO2 BLDV: 34 MM HG (ref 35–42)
POTASSIUM BLD-SCNC: 5.5 MMOL/L (ref 3.4–5.1)
POTASSIUM SERPL-SCNC: 3.3 MMOL/L (ref 3.4–5.1)
PROCALCITONIN SERPL IA-MCNC: 0.18 NG/ML
PROCALCITONIN SERPL IA-MCNC: ABNORMAL NG/ML
PROT SERPL-MCNC: 8.9 G/DL (ref 6.4–8.2)
PROT UR STRIP-MCNC: 100 MG/DL
RBC # BLD: 4.32 MIL/MCL (ref 4–5.2)
RBC #/AREA URNS HPF: ABNORMAL /HPF (ref 0–2)
RBC CASTS #/AREA URNS LPF: ABNORMAL /[LPF]
RENAL EPI CELLS #/AREA URNS HPF: ABNORMAL /[HPF]
SAO2 % BLDV: 55 % (ref 60–80)
SODIUM BLD-SCNC: 135 MMOL/L (ref 135–145)
SODIUM SERPL-SCNC: 134 MMOL/L (ref 135–145)
SP GR UR STRIP: 1.02 (ref 1–1.03)
SPECIMEN SOURCE: ABNORMAL
SPERM URNS QL MICRO: ABNORMAL
SQUAMOUS #/AREA URNS HPF: ABNORMAL /HPF (ref 0–5)
T VAGINALIS URNS QL MICRO: ABNORMAL
TRI-PHOS CRY URNS QL MICRO: ABNORMAL
TROPONIN I SERPL HS-MCNC: <0.02 NG/ML
URATE CRY URNS QL MICRO: ABNORMAL
URINE REFLEX: ABNORMAL
URNS CMNT MICRO: ABNORMAL
UROBILINOGEN UR STRIP-MCNC: 2 MG/DL (ref 0–1)
WAXY CASTS #/AREA URNS LPF: ABNORMAL /[LPF]
WBC # BLD: 8.9 K/MCL (ref 4.2–11)
WBC #/AREA URNS HPF: ABNORMAL /HPF (ref 0–5)
WBC CASTS #/AREA URNS LPF: ABNORMAL /[LPF]
YEAST HYPHAE URNS QL MICRO: ABNORMAL
YEAST URNS QL MICRO: ABNORMAL

## 2020-10-01 PROCEDURE — 99291 CRITICAL CARE FIRST HOUR: CPT | Performed by: EMERGENCY MEDICINE

## 2020-10-02 LAB
ALBUMIN SERPL-MCNC: 3.4 G/DL (ref 3.6–5.1)
ALBUMIN/GLOB SERPL: 0.9 {RATIO} (ref 1–2.4)
ALP SERPL-CCNC: 75 UNITS/L (ref 45–117)
ALT SERPL-CCNC: 99 UNITS/L
ANALYZER ANC (IANC): ABNORMAL
ANION GAP SERPL CALC-SCNC: 17 MMOL/L (ref 10–20)
AST SERPL-CCNC: 133 UNITS/L
AST SERPL-CCNC: ABNORMAL U/L
BASOPHILS # BLD: 0.1 K/MCL (ref 0–0.3)
BASOPHILS NFR BLD: 1 %
BILIRUB CONJ SERPL-MCNC: 0.2 MG/DL (ref 0–0.2)
BILIRUB CONJ SERPL-MCNC: NORMAL MG/DL
BILIRUB SERPL-MCNC: 1.2 MG/DL (ref 0.2–1)
BUN SERPL-MCNC: 4 MG/DL (ref 6–20)
BUN/CREAT SERPL: 8 (ref 7–25)
CALCIUM SERPL-MCNC: 8.5 MG/DL (ref 8.4–10.2)
CHLORIDE SERPL-SCNC: 101 MMOL/L (ref 98–107)
CO2 SERPL-SCNC: 25 MMOL/L (ref 21–32)
CREAT SERPL-MCNC: 0.5 MG/DL (ref 0.51–0.95)
DIFFERENTIAL METHOD BLD: ABNORMAL
EOSINOPHIL # BLD: 0.1 K/MCL (ref 0.1–0.5)
EOSINOPHIL NFR BLD: 2 %
ERYTHROCYTE [DISTWIDTH] IN BLOOD: 13 % (ref 11–15)
GLOBULIN SER-MCNC: 3.6 G/DL (ref 2–4)
GLUCOSE SERPL-MCNC: 79 MG/DL (ref 65–99)
HCT VFR BLD CALC: 40 % (ref 36–46.5)
HGB BLD-MCNC: 13.3 G/DL (ref 12–15.5)
IMM GRANULOCYTES # BLD AUTO: 0 K/MCL (ref 0–0.2)
IMM GRANULOCYTES NFR BLD: 0 %
LACTATE BLDV-SCNC: 0.9 MMOL/L (ref 0–2)
LYMPHOCYTES # BLD: 1 K/MCL (ref 1–4.8)
LYMPHOCYTES NFR BLD: 18 %
MAGNESIUM SERPL-MCNC: 2.2 MG/DL (ref 1.7–2.4)
MCH RBC QN AUTO: 33.8 PG (ref 26–34)
MCHC RBC AUTO-ENTMCNC: 33.3 G/DL (ref 32–36.5)
MCV RBC AUTO: 101.8 FL (ref 78–100)
MONOCYTES # BLD: 0.6 K/MCL (ref 0.3–0.9)
MONOCYTES NFR BLD: 11 %
NEUTROPHILS # BLD: 3.7 K/MCL (ref 1.8–7.7)
NEUTROPHILS NFR BLD: 68 %
NEUTS SEG NFR BLD: ABNORMAL %
NRBC BLD MANUAL-RTO: 0 /100 WBC
PLATELET # BLD: 114 K/MCL (ref 140–450)
POTASSIUM SERPL-SCNC: 4.4 MMOL/L (ref 3.4–5.1)
POTASSIUM SERPL-SCNC: ABNORMAL MMOL/L
PROT SERPL-MCNC: 7 G/DL (ref 6.4–8.2)
RBC # BLD: 3.93 MIL/MCL (ref 4–5.2)
SARS-COV-2 RNA RESP QL NAA+PROBE: NOT DETECTED
SERVICE CMNT-IMP: NORMAL
SODIUM SERPL-SCNC: 139 MMOL/L (ref 135–145)
SPECIMEN SOURCE: NORMAL
WBC # BLD: 5.5 K/MCL (ref 4.2–11)

## 2020-10-08 ENCOUNTER — HOSPITAL (OUTPATIENT)
Dept: OTHER | Age: 40
End: 2020-10-08

## 2020-10-08 LAB
ANALYZER ANC (IANC): ABNORMAL
ANION GAP SERPL CALC-SCNC: 21 MMOL/L (ref 10–20)
BASOPHILS # BLD: 0.1 K/MCL (ref 0–0.3)
BASOPHILS NFR BLD: 2 %
BUN SERPL-MCNC: 9 MG/DL (ref 6–20)
BUN/CREAT SERPL: 18 (ref 7–25)
CALCIUM SERPL-MCNC: 9.1 MG/DL (ref 8.4–10.2)
CHLORIDE SERPL-SCNC: 99 MMOL/L (ref 98–107)
CO2 SERPL-SCNC: 24 MMOL/L (ref 21–32)
CREAT SERPL-MCNC: 0.5 MG/DL (ref 0.51–0.95)
DIFFERENTIAL METHOD BLD: ABNORMAL
EOSINOPHIL # BLD: 0.1 K/MCL (ref 0.1–0.5)
EOSINOPHIL NFR BLD: 1 %
ERYTHROCYTE [DISTWIDTH] IN BLOOD: 13.1 % (ref 11–15)
ETHANOL SERPL-MCNC: NORMAL MG/DL
GLUCOSE SERPL-MCNC: 109 MG/DL (ref 65–99)
HCT VFR BLD CALC: 38.6 % (ref 36–46.5)
HGB BLD-MCNC: 12.8 G/DL (ref 12–15.5)
IMM GRANULOCYTES # BLD AUTO: 0 K/MCL (ref 0–0.2)
IMM GRANULOCYTES NFR BLD: 0 %
LYMPHOCYTES # BLD: 1.2 K/MCL (ref 1–4.8)
LYMPHOCYTES NFR BLD: 23 %
MCH RBC QN AUTO: 34.1 PG (ref 26–34)
MCHC RBC AUTO-ENTMCNC: 33.2 G/DL (ref 32–36.5)
MCV RBC AUTO: 102.9 FL (ref 78–100)
MONOCYTES # BLD: 0.9 K/MCL (ref 0.3–0.9)
MONOCYTES NFR BLD: 16 %
NEUTROPHILS # BLD: 3.1 K/MCL (ref 1.8–7.7)
NEUTROPHILS NFR BLD: 58 %
NEUTS SEG NFR BLD: ABNORMAL %
NRBC BLD MANUAL-RTO: 0 /100 WBC
PLATELET # BLD: 190 K/MCL (ref 140–450)
POTASSIUM SERPL-SCNC: 4.5 MMOL/L (ref 3.4–5.1)
POTASSIUM SERPL-SCNC: ABNORMAL MMOL/L
POTASSIUM SERPL-SCNC: ABNORMAL MMOL/L
RBC # BLD: 3.75 MIL/MCL (ref 4–5.2)
SODIUM SERPL-SCNC: 139 MMOL/L (ref 135–145)
WBC # BLD: 5.3 K/MCL (ref 4.2–11)

## 2020-10-10 PROCEDURE — 99284 EMERGENCY DEPT VISIT MOD MDM: CPT | Performed by: EMERGENCY MEDICINE

## 2021-04-06 ENCOUNTER — HOSPITAL ENCOUNTER (INPATIENT)
Age: 41
LOS: 5 days | Discharge: NURSING, REHAB OR LONG-TERM CARE | End: 2021-04-12
Attending: EMERGENCY MEDICINE | Admitting: INTERNAL MEDICINE

## 2021-04-06 DIAGNOSIS — F10.20 ALCOHOL USE DISORDER, SEVERE, DEPENDENCE (CMD): ICD-10-CM

## 2021-04-06 DIAGNOSIS — F10.920 ALCOHOLIC INTOXICATION WITHOUT COMPLICATION (CMD): Primary | ICD-10-CM

## 2021-04-06 LAB — ETHANOL SERPL-MCNC: 335 MG/DL

## 2021-04-06 PROCEDURE — X1094 NO CHARGE VISIT: HCPCS | Performed by: EMERGENCY MEDICINE

## 2021-04-06 PROCEDURE — 80053 COMPREHEN METABOLIC PANEL: CPT | Performed by: EMERGENCY MEDICINE

## 2021-04-06 PROCEDURE — 36415 COLL VENOUS BLD VENIPUNCTURE: CPT

## 2021-04-06 PROCEDURE — 82077 ASSAY SPEC XCP UR&BREATH IA: CPT | Performed by: EMERGENCY MEDICINE

## 2021-04-06 PROCEDURE — 99284 EMERGENCY DEPT VISIT MOD MDM: CPT | Performed by: EMERGENCY MEDICINE

## 2021-04-06 PROCEDURE — 99285 EMERGENCY DEPT VISIT HI MDM: CPT

## 2021-04-06 RX ORDER — LEVETIRACETAM 100 MG/ML
500 SOLUTION ORAL 2 TIMES DAILY
Status: ON HOLD | COMMUNITY
End: 2021-04-10 | Stop reason: HOSPADM

## 2021-04-06 ASSESSMENT — ENCOUNTER SYMPTOMS
WHEEZING: 0
BRUISES/BLEEDS EASILY: 0
NAUSEA: 0
COUGH: 0
DIARRHEA: 0
COLOR CHANGE: 0
SEIZURES: 0
CHEST TIGHTNESS: 0
ACTIVITY CHANGE: 0
BLOOD IN STOOL: 0
RHINORRHEA: 0
CHILLS: 0
NUMBNESS: 0
ABDOMINAL DISTENTION: 0
VOMITING: 0
WEAKNESS: 0
EYE PAIN: 0
DIAPHORESIS: 0
SHORTNESS OF BREATH: 0
LIGHT-HEADEDNESS: 0
EYE DISCHARGE: 0
FEVER: 0
APPETITE CHANGE: 0
SORE THROAT: 0
ABDOMINAL PAIN: 0
DIZZINESS: 0
BACK PAIN: 0
WOUND: 0
CONSTIPATION: 0
HEADACHES: 0
SPEECH DIFFICULTY: 0
CONFUSION: 0
FATIGUE: 0

## 2021-04-06 ASSESSMENT — PAIN SCALES - GENERAL: PAINLEVEL_OUTOF10: 0

## 2021-04-06 ASSESSMENT — VISUAL ACUITY: OU: 1

## 2021-04-07 PROBLEM — F10.20 ALCOHOL USE DISORDER, SEVERE, DEPENDENCE (CMD): Status: ACTIVE | Noted: 2021-04-07

## 2021-04-07 LAB
ALBUMIN SERPL-MCNC: 3.7 G/DL (ref 3.6–5.1)
ALBUMIN/GLOB SERPL: 1.1 {RATIO} (ref 1–2.4)
ALP SERPL-CCNC: 71 UNITS/L (ref 45–117)
ALT SERPL-CCNC: 27 UNITS/L
ANION GAP SERPL CALC-SCNC: 14 MMOL/L (ref 10–20)
AST SERPL-CCNC: 33 UNITS/L
BASOPHILS # BLD: 0.1 K/MCL (ref 0–0.3)
BASOPHILS NFR BLD: 1 %
BILIRUB SERPL-MCNC: 0.2 MG/DL (ref 0.2–1)
BUN SERPL-MCNC: 6 MG/DL (ref 6–20)
BUN/CREAT SERPL: 8 (ref 7–25)
CALCIUM SERPL-MCNC: 8.6 MG/DL (ref 8.4–10.2)
CHLORIDE SERPL-SCNC: 103 MMOL/L (ref 98–107)
CO2 SERPL-SCNC: 25 MMOL/L (ref 21–32)
CREAT SERPL-MCNC: 0.72 MG/DL (ref 0.51–0.95)
DEPRECATED RDW RBC: 43.7 FL (ref 39–50)
EOSINOPHIL # BLD: 0 K/MCL (ref 0–0.5)
EOSINOPHIL NFR BLD: 0 %
ERYTHROCYTE [DISTWIDTH] IN BLOOD: 12.4 % (ref 11–15)
FASTING DURATION TIME PATIENT: ABNORMAL H
GFR SERPLBLD BASED ON 1.73 SQ M-ARVRAT: >90 ML/MIN/1.73M2
GLOBULIN SER-MCNC: 3.5 G/DL (ref 2–4)
GLUCOSE SERPL-MCNC: 109 MG/DL (ref 65–99)
HCT VFR BLD CALC: 36.8 % (ref 36–46.5)
HGB BLD-MCNC: 12.7 G/DL (ref 12–15.5)
IMM GRANULOCYTES # BLD AUTO: 0 K/MCL (ref 0–0.2)
IMM GRANULOCYTES # BLD: 0 %
LYMPHOCYTES # BLD: 1.1 K/MCL (ref 1–4.8)
LYMPHOCYTES NFR BLD: 10 %
MCH RBC QN AUTO: 33.2 PG (ref 26–34)
MCHC RBC AUTO-ENTMCNC: 34.5 G/DL (ref 32–36.5)
MCV RBC AUTO: 96.1 FL (ref 78–100)
MONOCYTES # BLD: 0.5 K/MCL (ref 0.3–0.9)
MONOCYTES NFR BLD: 4 %
NEUTROPHILS # BLD: 9 K/MCL (ref 1.8–7.7)
NEUTROPHILS NFR BLD: 85 %
NRBC BLD MANUAL-RTO: 0 /100 WBC
PLATELET # BLD AUTO: 215 K/MCL (ref 140–450)
POTASSIUM SERPL-SCNC: 3.3 MMOL/L (ref 3.4–5.1)
PROT SERPL-MCNC: 7.2 G/DL (ref 6.4–8.2)
RAINBOW EXTRA TUBES HOLD SPECIMEN: NORMAL
RBC # BLD: 3.83 MIL/MCL (ref 4–5.2)
SARS-COV-2 RNA RESP QL NAA+PROBE: NOT DETECTED
SERVICE CMNT-IMP: NORMAL
SERVICE CMNT-IMP: NORMAL
SODIUM SERPL-SCNC: 139 MMOL/L (ref 135–145)
WBC # BLD: 10.7 K/MCL (ref 4.2–11)

## 2021-04-07 PROCEDURE — 10002803 HB RX 637: Performed by: STUDENT IN AN ORGANIZED HEALTH CARE EDUCATION/TRAINING PROGRAM

## 2021-04-07 PROCEDURE — U0005 INFEC AGEN DETEC AMPLI PROBE: HCPCS | Performed by: EMERGENCY MEDICINE

## 2021-04-07 PROCEDURE — 10002807 HB RX 258: Performed by: INTERNAL MEDICINE

## 2021-04-07 PROCEDURE — 10002803 HB RX 637: Performed by: EMERGENCY MEDICINE

## 2021-04-07 PROCEDURE — 85025 COMPLETE CBC W/AUTO DIFF WBC: CPT | Performed by: EMERGENCY MEDICINE

## 2021-04-07 PROCEDURE — 10002800 HB RX 250 W HCPCS: Performed by: INTERNAL MEDICINE

## 2021-04-07 PROCEDURE — 10002803 HB RX 637: Performed by: INTERNAL MEDICINE

## 2021-04-07 PROCEDURE — HZ2ZZZZ DETOXIFICATION SERVICES FOR SUBSTANCE ABUSE TREATMENT: ICD-10-PCS | Performed by: INTERNAL MEDICINE

## 2021-04-07 PROCEDURE — 10004651 HB RX, NO CHARGE ITEM: Performed by: INTERNAL MEDICINE

## 2021-04-07 PROCEDURE — 10002800 HB RX 250 W HCPCS: Performed by: EMERGENCY MEDICINE

## 2021-04-07 PROCEDURE — 10006031 HB ROOM CHARGE TELEMETRY

## 2021-04-07 PROCEDURE — 99223 1ST HOSP IP/OBS HIGH 75: CPT | Performed by: INTERNAL MEDICINE

## 2021-04-07 RX ORDER — GABAPENTIN 300 MG/1
300 CAPSULE ORAL EVERY 8 HOURS SCHEDULED
Status: DISCONTINUED | OUTPATIENT
Start: 2021-04-07 | End: 2021-04-10

## 2021-04-07 RX ORDER — LANOLIN ALCOHOL/MO/W.PET/CERES
100 CREAM (GRAM) TOPICAL DAILY
Status: COMPLETED | OUTPATIENT
Start: 2021-04-07 | End: 2021-04-09

## 2021-04-07 RX ORDER — FAMOTIDINE 20 MG/1
20 TABLET, FILM COATED ORAL EVERY 12 HOURS SCHEDULED
Status: DISCONTINUED | OUTPATIENT
Start: 2021-04-07 | End: 2021-04-12 | Stop reason: HOSPADM

## 2021-04-07 RX ORDER — AMLODIPINE BESYLATE 2.5 MG/1
2.5 TABLET ORAL DAILY
Status: ON HOLD | COMMUNITY
End: 2021-04-10 | Stop reason: SDUPTHER

## 2021-04-07 RX ORDER — FOLIC ACID 1 MG/1
1 TABLET ORAL DAILY
Status: COMPLETED | OUTPATIENT
Start: 2021-04-07 | End: 2021-04-09

## 2021-04-07 RX ORDER — HYDRALAZINE HYDROCHLORIDE 25 MG/1
25 TABLET, FILM COATED ORAL EVERY 4 HOURS PRN
Status: DISCONTINUED | OUTPATIENT
Start: 2021-04-07 | End: 2021-04-12 | Stop reason: HOSPADM

## 2021-04-07 RX ORDER — AMLODIPINE BESYLATE 2.5 MG/1
2.5 TABLET ORAL DAILY
Status: DISCONTINUED | OUTPATIENT
Start: 2021-04-07 | End: 2021-04-12 | Stop reason: HOSPADM

## 2021-04-07 RX ORDER — LEVETIRACETAM 500 MG/1
500 TABLET ORAL EVERY 12 HOURS SCHEDULED
Status: DISCONTINUED | OUTPATIENT
Start: 2021-04-07 | End: 2021-04-12 | Stop reason: HOSPADM

## 2021-04-07 RX ORDER — MAGNESIUM HYDROXIDE/ALUMINUM HYDROXICE/SIMETHICONE 120; 1200; 1200 MG/30ML; MG/30ML; MG/30ML
30 SUSPENSION ORAL EVERY 4 HOURS PRN
Status: DISCONTINUED | OUTPATIENT
Start: 2021-04-07 | End: 2021-04-12 | Stop reason: HOSPADM

## 2021-04-07 RX ORDER — ENOXAPARIN SODIUM 100 MG/ML
40 INJECTION SUBCUTANEOUS NIGHTLY
Status: DISCONTINUED | OUTPATIENT
Start: 2021-04-07 | End: 2021-04-12 | Stop reason: HOSPADM

## 2021-04-07 RX ORDER — TRAZODONE HYDROCHLORIDE 150 MG/1
150 TABLET ORAL NIGHTLY
Status: ON HOLD | COMMUNITY
End: 2021-04-10 | Stop reason: SDUPTHER

## 2021-04-07 RX ORDER — TRAZODONE HYDROCHLORIDE 50 MG/1
150 TABLET ORAL NIGHTLY
Status: DISCONTINUED | OUTPATIENT
Start: 2021-04-07 | End: 2021-04-12 | Stop reason: HOSPADM

## 2021-04-07 RX ORDER — MULTIVITAMIN,THER AND MINERALS
1 TABLET ORAL DAILY
Status: DISCONTINUED | OUTPATIENT
Start: 2021-04-07 | End: 2021-04-07 | Stop reason: RX

## 2021-04-07 RX ORDER — AMOXICILLIN 250 MG
2 CAPSULE ORAL DAILY PRN
Status: DISCONTINUED | OUTPATIENT
Start: 2021-04-07 | End: 2021-04-12 | Stop reason: HOSPADM

## 2021-04-07 RX ORDER — LORAZEPAM 2 MG/ML
2 INJECTION INTRAMUSCULAR
Status: DISCONTINUED | OUTPATIENT
Start: 2021-04-07 | End: 2021-04-10

## 2021-04-07 RX ORDER — 0.9 % SODIUM CHLORIDE 0.9 %
2 VIAL (ML) INJECTION EVERY 12 HOURS SCHEDULED
Status: DISCONTINUED | OUTPATIENT
Start: 2021-04-07 | End: 2021-04-12 | Stop reason: HOSPADM

## 2021-04-07 RX ORDER — ONDANSETRON 2 MG/ML
4 INJECTION INTRAMUSCULAR; INTRAVENOUS 2 TIMES DAILY PRN
Status: DISCONTINUED | OUTPATIENT
Start: 2021-04-07 | End: 2021-04-12 | Stop reason: HOSPADM

## 2021-04-07 RX ORDER — DEXTROSE, SODIUM CHLORIDE, SODIUM LACTATE, POTASSIUM CHLORIDE, AND CALCIUM CHLORIDE 5; .6; .31; .03; .02 G/100ML; G/100ML; G/100ML; G/100ML; G/100ML
INJECTION, SOLUTION INTRAVENOUS CONTINUOUS
Status: DISCONTINUED | OUTPATIENT
Start: 2021-04-07 | End: 2021-04-08 | Stop reason: ALTCHOICE

## 2021-04-07 RX ORDER — FOLIC ACID 1 MG/1
1 TABLET ORAL DAILY
Status: ON HOLD | COMMUNITY
End: 2021-04-10 | Stop reason: HOSPADM

## 2021-04-07 RX ORDER — LORAZEPAM 2 MG/ML
3 INJECTION INTRAMUSCULAR
Status: DISCONTINUED | OUTPATIENT
Start: 2021-04-07 | End: 2021-04-10

## 2021-04-07 RX ORDER — POTASSIUM CHLORIDE 20 MEQ/1
40 TABLET, EXTENDED RELEASE ORAL ONCE
Status: COMPLETED | OUTPATIENT
Start: 2021-04-07 | End: 2021-04-07

## 2021-04-07 RX ORDER — LORAZEPAM 0.5 MG/1
0.5 TABLET ORAL ONCE
Status: COMPLETED | OUTPATIENT
Start: 2021-04-07 | End: 2021-04-07

## 2021-04-07 RX ORDER — LORAZEPAM 2 MG/ML
2 INJECTION INTRAMUSCULAR ONCE
Status: COMPLETED | OUTPATIENT
Start: 2021-04-07 | End: 2021-04-07

## 2021-04-07 RX ORDER — LORAZEPAM 2 MG/ML
4 INJECTION INTRAMUSCULAR
Status: DISCONTINUED | OUTPATIENT
Start: 2021-04-07 | End: 2021-04-10

## 2021-04-07 RX ORDER — SERTRALINE HYDROCHLORIDE 100 MG/1
150 TABLET, FILM COATED ORAL DAILY
Status: ON HOLD | COMMUNITY
End: 2021-04-10 | Stop reason: HOSPADM

## 2021-04-07 RX ORDER — LEVETIRACETAM 500 MG/1
500 TABLET ORAL ONCE
Status: COMPLETED | OUTPATIENT
Start: 2021-04-07 | End: 2021-04-07

## 2021-04-07 RX ORDER — ACETAMINOPHEN 325 MG/1
650 TABLET ORAL EVERY 4 HOURS PRN
Status: DISCONTINUED | OUTPATIENT
Start: 2021-04-07 | End: 2021-04-12 | Stop reason: HOSPADM

## 2021-04-07 RX ADMIN — TRAZODONE HYDROCHLORIDE 150 MG: 50 TABLET ORAL at 21:23

## 2021-04-07 RX ADMIN — FOLIC ACID 1 MG: 1 TABLET ORAL at 15:17

## 2021-04-07 RX ADMIN — LORAZEPAM 2 MG: 2 INJECTION INTRAMUSCULAR; INTRAVENOUS at 14:57

## 2021-04-07 RX ADMIN — LORAZEPAM 0.5 MG: 0.5 TABLET ORAL at 05:29

## 2021-04-07 RX ADMIN — POTASSIUM CHLORIDE 40 MEQ: 1500 TABLET, EXTENDED RELEASE ORAL at 21:24

## 2021-04-07 RX ADMIN — LEVETIRACETAM 500 MG: 500 TABLET, FILM COATED ORAL at 22:06

## 2021-04-07 RX ADMIN — FAMOTIDINE 20 MG: 20 TABLET, FILM COATED ORAL at 22:06

## 2021-04-07 RX ADMIN — SODIUM CHLORIDE, PRESERVATIVE FREE 2 ML: 5 INJECTION INTRAVENOUS at 21:25

## 2021-04-07 RX ADMIN — LORAZEPAM 2 MG: 2 INJECTION INTRAMUSCULAR; INTRAVENOUS at 09:26

## 2021-04-07 RX ADMIN — LORAZEPAM 2 MG: 2 INJECTION INTRAMUSCULAR; INTRAVENOUS at 21:23

## 2021-04-07 RX ADMIN — AMLODIPINE BESYLATE 2.5 MG: 2.5 TABLET ORAL at 22:07

## 2021-04-07 RX ADMIN — ENOXAPARIN SODIUM 40 MG: 40 INJECTION SUBCUTANEOUS at 22:07

## 2021-04-07 RX ADMIN — Medication 100 MG: at 15:17

## 2021-04-07 RX ADMIN — ACETAMINOPHEN 650 MG: 325 TABLET, FILM COATED ORAL at 21:23

## 2021-04-07 RX ADMIN — DEXTROSE, SODIUM CHLORIDE, SODIUM LACTATE, POTASSIUM CHLORIDE, AND CALCIUM CHLORIDE: 5; .6; .31; .03; .02 INJECTION, SOLUTION INTRAVENOUS at 22:13

## 2021-04-07 RX ADMIN — LEVETIRACETAM 500 MG: 500 TABLET, FILM COATED ORAL at 07:52

## 2021-04-07 ASSESSMENT — LIFESTYLE VARIABLES
VISUAL DISTURBANCES: NOT PRESENT
AUDITORY DISTURBANCES: NOT PRESENT
ANXIETY: MILDLY ANXIOUS
TACTILE DISTURBANCES: NOT PRESENT
PAROXYSMAL SWEATS: BARELY PERCEPTIBLE SWEATING, PALMS MOIST
VISUAL DISTURBANCES: NOT PRESENT
HEADACHE, FULLNESS IN HEAD: NOT PRESENT
HISTORY OF PROBLEMS WHEN YOU STOP DRINKING ALCOHOL: SEIZURE;HOSPITALIZATION
ANXIETY: 2
TACTILE DISTURBANCES: NOT PRESENT
AGITATION: NORMAL ACTIVITY
HEADACHE, FULLNESS IN HEAD: NOT PRESENT
AUDITORY DISTURBANCES: NOT PRESENT
TACTILE DISTURBANCES: NOT PRESENT
AUDITORY DISTURBANCES: NOT PRESENT
TREMOR: MODERATE, WITH PATIENT'S ARMS EXTENDED
TREMOR: 2
AUDITORY DISTURBANCES: NOT PRESENT
PRIOR ALCOHOL TREATMENT: YES
TACTILE DISTURBANCES: NOT PRESENT
HEADACHE, FULLNESS IN HEAD: MILD
HEADACHE, FULLNESS IN HEAD: NOT PRESENT
ANXIETY: MILDLY ANXIOUS
VISUAL DISTURBANCES: NOT PRESENT
TREMOR: MODERATE, WITH PATIENT'S ARMS EXTENDED
ANXIETY: 6
ANXIETY: 2
NAUSEA AND VOMITING: NO NAUSEA AND NO VOMITING
TREMOR: MODERATE, WITH PATIENT'S ARMS EXTENDED
AUDITORY DISTURBANCES: NOT PRESENT
LAST DRINK YOU HAD: 48 HOURS OR LESS
NAUSEA AND VOMITING: NO NAUSEA AND NO VOMITING
TACTILE DISTURBANCES: NOT PRESENT
VISUAL DISTURBANCES: NOT PRESENT
VISUAL DISTURBANCES: NOT PRESENT
NAUSEA AND VOMITING: NO NAUSEA AND NO VOMITING
NAUSEA AND VOMITING: NO NAUSEA AND NO VOMITING
TREMOR: 2
AGITATION: NORMAL ACTIVITY
PAROXYSMAL SWEATS: BARELY PERCEPTIBLE SWEATING, PALMS MOIST
AGITATION: NORMAL ACTIVITY
HEADACHE, FULLNESS IN HEAD: NOT PRESENT
HEADACHE, FULLNESS IN HEAD: NOT PRESENT
PAROXYSMAL SWEATS: NO SWEAT VISIBLE
PAROXYSMAL SWEATS: 2
AGITATION: NORMAL ACTIVITY
AGITATION: NORMAL ACTIVITY
AUDITORY DISTURBANCES: NOT PRESENT
NAUSEA AND VOMITING: NO NAUSEA AND NO VOMITING
HEADACHE, FULLNESS IN HEAD: NOT PRESENT
ANXIETY: MILDLY ANXIOUS
AGITATION: SOMEWHAT MORE THAN NORMAL ACTIVITY
HOW OFTEN DO YOU HAVE 6 OR MORE DRINKS ON ONE OCCASION: DAILY OR ALMOST DAILY
PAROXYSMAL SWEATS: NO SWEAT VISIBLE
AGITATION: NORMAL ACTIVITY
VISUAL DISTURBANCES: NOT PRESENT
HEADACHE, FULLNESS IN HEAD: NOT PRESENT
AUDITORY DISTURBANCES: NOT PRESENT
HEADACHE, FULLNESS IN HEAD: NOT PRESENT
NAUSEA AND VOMITING: NO NAUSEA AND NO VOMITING
AGITATION: 2
TACTILE DISTURBANCES: NOT PRESENT
VISUAL DISTURBANCES: NOT PRESENT
NAUSEA AND VOMITING: NO NAUSEA AND NO VOMITING
VISUAL DISTURBANCES: NOT PRESENT
NAUSEA AND VOMITING: NO NAUSEA AND NO VOMITING
AUDITORY DISTURBANCES: NOT PRESENT
TREMOR: MODERATE, WITH PATIENT'S ARMS EXTENDED
ANXIETY: 3
TREMOR: NOT VISIBLE, BUT CAN BE FELT FINGERTIP TO FINGERTIP
NAUSEA AND VOMITING: NO NAUSEA AND NO VOMITING
NAUSEA AND VOMITING: NO NAUSEA AND NO VOMITING
TACTILE DISTURBANCES: NOT PRESENT
AUDITORY DISTURBANCES: NOT PRESENT
TREMOR: 2
PAROXYSMAL SWEATS: NO SWEAT VISIBLE
TACTILE DISTURBANCES: NOT PRESENT
TACTILE DISTURBANCES: NOT PRESENT
HEADACHE, FULLNESS IN HEAD: NOT PRESENT
TACTILE DISTURBANCES: NOT PRESENT
VISUAL DISTURBANCES: NOT PRESENT
AGITATION: NORMAL ACTIVITY
TREMOR: 2
ANXIETY: 3
HEADACHE, FULLNESS IN HEAD: NOT PRESENT
ANXIETY: MILDLY ANXIOUS
HEADACHE, FULLNESS IN HEAD: NOT PRESENT
HOW MANY STANDARD DRINKS CONTAINING ALCOHOL DO YOU HAVE ON A TYPICAL DAY: 10 OR MORE
AUDIT-C TOTAL SCORE: 12
ANXIETY: MODERATELY ANXIOUS
NAUSEA AND VOMITING: NO NAUSEA AND NO VOMITING
VISUAL DISTURBANCES: NOT PRESENT
TACTILE DISTURBANCES: NOT PRESENT
PAROXYSMAL SWEATS: NO SWEAT VISIBLE
HOW OFTEN DO YOU HAVE A DRINK CONTAINING ALCOHOL: 4 OR MORE TIMES PER WEEK
PAROXYSMAL SWEATS: NO SWEAT VISIBLE
PAROXYSMAL SWEATS: 2
VISUAL DISTURBANCES: NOT PRESENT
AUDITORY DISTURBANCES: NOT PRESENT
PAROXYSMAL SWEATS: NO SWEAT VISIBLE
VISUAL DISTURBANCES: NOT PRESENT
PAROXYSMAL SWEATS: NO SWEAT VISIBLE
TACTILE DISTURBANCES: NOT PRESENT
AUDITORY DISTURBANCES: NOT PRESENT
AUDITORY DISTURBANCES: NOT PRESENT
NAUSEA AND VOMITING: NO NAUSEA AND NO VOMITING
NAUSEA AND VOMITING: NO NAUSEA AND NO VOMITING
TACTILE DISTURBANCES: NOT PRESENT
PAROXYSMAL SWEATS: BARELY PERCEPTIBLE SWEATING, PALMS MOIST
ANXIETY: MILDLY ANXIOUS
AGITATION: NORMAL ACTIVITY
TREMOR: 3
AUDITORY DISTURBANCES: NOT PRESENT
VISUAL DISTURBANCES: NOT PRESENT
TREMOR: 3
PAROXYSMAL SWEATS: NO SWEAT VISIBLE
ALCOHOL_USE_STATUS: UNHEALTHY DRINKING IDENTIFIED. AUDIT C: 3 OR MORE FOR WOMEN AND 4 OR MORE FOR MEN.
TREMOR: NOT VISIBLE, BUT CAN BE FELT FINGERTIP TO FINGERTIP
AGITATION: NORMAL ACTIVITY
ANXIETY: 2
ANXIETY: NO ANXIETY, AT EASE
TREMOR: 2
HEADACHE, FULLNESS IN HEAD: NOT PRESENT

## 2021-04-07 ASSESSMENT — PATIENT HEALTH QUESTIONNAIRE - PHQ9
5. POOR APPETITE OR OVEREATING: MORE THAN HALF THE DAYS
SUM OF ALL RESPONSES TO PHQ9 QUESTIONS 1 TO 9: 15
1. LITTLE INTEREST OR PLEASURE IN DOING THINGS: MORE THAN HALF THE DAYS
7. TROUBLE CONCENTRATING ON THINGS, SUCH AS READING THE NEWSPAPER OR WATCHING TELEVISION: MORE THAN HALF THE DAYS
IS PATIENT ABLE TO COMPLETE PHQ2 OR PHQ9: YES
CLINICAL INTERPRETATION OF PHQ9 SCORE: MODERATELY SEVERE DEPRESSION
9. THOUGHTS THAT YOU WOULD BE BETTER OFF DEAD, OR OF HURTING YOURSELF: NOT AT ALL
2. FEELING DOWN, DEPRESSED OR HOPELESS: MORE THAN HALF THE DAYS
CLINICAL INTERPRETATION OF PHQ2 SCORE: FURTHER SCREENING NEEDED
SUM OF ALL RESPONSES TO PHQ9 QUESTIONS 1 AND 2: 4
CLINICAL INTERPRETATION OF PHQ9 SCORE: FURTHER SCREENING NEEDED
SUM OF ALL RESPONSES TO PHQ9 QUESTIONS 1 AND 2: 4
6. FEELING BAD ABOUT YOURSELF - OR THAT YOU ARE A FAILURE OR HAVE LET YOURSELF OR YOUR FAMILY DOWN: MORE THAN HALF THE DAYS
8. MOVING OR SPEAKING SO SLOWLY THAT OTHER PEOPLE COULD HAVE NOTICED. OR THE OPPOSITE, BEING SO FIGETY OR RESTLESS THAT YOU HAVE BEEN MOVING AROUND A LOT MORE THAN USUAL: SEVERAL DAYS
CLINICAL INTERPRETATION OF PHQ2 SCORE: MODERATELY SEVERE DEPRESSION
3. TROUBLE FALLING OR STAYING ASLEEP OR SLEEPING TOO MUCH: MORE THAN HALF THE DAYS
SUM OF ALL RESPONSES TO PHQ QUESTIONS 1-9: 15
4. FEELING TIRED OR HAVING LITTLE ENERGY: MORE THAN HALF THE DAYS
10. IF YOU CHECKED OFF ANY PROBLEMS, HOW DIFFICULT HAVE THESE PROBLEMS MADE IT FOR YOU TO DO YOUR WORK, TAKE CARE OF THINGS AT HOME, OR GET ALONG WITH OTHER PEOPLE: VERY DIFFICULT

## 2021-04-07 ASSESSMENT — COGNITIVE AND FUNCTIONAL STATUS - GENERAL
BECAUSE OF A PHYSICAL, MENTAL, OR EMOTIONAL CONDITION, DO YOU HAVE SERIOUS DIFFICULTY CONCENTRATING, REMEMBERING OR MAKING DECISIONS: NO
ARE YOU DEAF OR DO YOU HAVE SERIOUS DIFFICULTY  HEARING: NO
DO YOU HAVE SERIOUS DIFFICULTY WALKING OR CLIMBING STAIRS: NO
ARE YOU BLIND OR DO YOU HAVE SERIOUS DIFFICULTY SEEING, EVEN WHEN WEARING GLASSES: NO
DO YOU HAVE DIFFICULTY DRESSING OR BATHING: NO
ARE YOU BLIND OR DO YOU HAVE SERIOUS DIFFICULTY SEEING, EVEN WHEN WEARING GLASSES: NO
BECAUSE OF A PHYSICAL, MENTAL, OR EMOTIONAL CONDITION, DO YOU HAVE DIFFICULTY DOING ERRANDS ALONE: NO

## 2021-04-07 ASSESSMENT — ACTIVITIES OF DAILY LIVING (ADL)
ADL_SCORE: 12
RECENT_DECLINE_ADL: NO
ADL_BEFORE_ADMISSION: INDEPENDENT
CHRONIC_PAIN_PRESENT: NO
ADL_SHORT_OF_BREATH: NO
ADL_SHORT_OF_BREATH: NO
ADL_SCORE: 12
CHRONIC_PAIN_PRESENT: NO
ADL_BEFORE_ADMISSION: INDEPENDENT
RECENT_DECLINE_ADL: NO

## 2021-04-07 ASSESSMENT — COLUMBIA-SUICIDE SEVERITY RATING SCALE - C-SSRS
2. HAVE YOU ACTUALLY HAD ANY THOUGHTS OF KILLING YOURSELF?: NO
6. HAVE YOU EVER DONE ANYTHING, STARTED TO DO ANYTHING, OR PREPARED TO DO ANYTHING TO END YOUR LIFE?: NO
1. WITHIN THE PAST MONTH, HAVE YOU WISHED YOU WERE DEAD OR WISHED YOU COULD GO TO SLEEP AND NOT WAKE UP?: NO
IS THE PATIENT ABLE TO COMPLETE C-SSRS: YES

## 2021-04-07 ASSESSMENT — PAIN SCALES - GENERAL: PAINLEVEL_OUTOF10: 6

## 2021-04-08 LAB
ALBUMIN SERPL-MCNC: 3.2 G/DL (ref 3.6–5.1)
ALBUMIN/GLOB SERPL: 1 {RATIO} (ref 1–2.4)
ALP SERPL-CCNC: 68 UNITS/L (ref 45–117)
ALT SERPL-CCNC: 21 UNITS/L
ANION GAP SERPL CALC-SCNC: 11 MMOL/L (ref 10–20)
AST SERPL-CCNC: 17 UNITS/L
BASOPHILS # BLD: 0 K/MCL (ref 0–0.3)
BASOPHILS NFR BLD: 1 %
BILIRUB SERPL-MCNC: 0.5 MG/DL (ref 0.2–1)
BUN SERPL-MCNC: 4 MG/DL (ref 6–20)
BUN/CREAT SERPL: 7 (ref 7–25)
CALCIUM SERPL-MCNC: 8.4 MG/DL (ref 8.4–10.2)
CHLORIDE SERPL-SCNC: 104 MMOL/L (ref 98–107)
CO2 SERPL-SCNC: 25 MMOL/L (ref 21–32)
CREAT SERPL-MCNC: 0.58 MG/DL (ref 0.51–0.95)
DEPRECATED RDW RBC: 44.6 FL (ref 39–50)
EOSINOPHIL # BLD: 0.1 K/MCL (ref 0–0.5)
EOSINOPHIL NFR BLD: 2 %
ERYTHROCYTE [DISTWIDTH] IN BLOOD: 12.5 % (ref 11–15)
FASTING DURATION TIME PATIENT: ABNORMAL H
FOLATE SERPL-MCNC: >24 NG/ML
GFR SERPLBLD BASED ON 1.73 SQ M-ARVRAT: >90 ML/MIN/1.73M2
GLOBULIN SER-MCNC: 3.2 G/DL (ref 2–4)
GLUCOSE SERPL-MCNC: 85 MG/DL (ref 65–99)
HCT VFR BLD CALC: 36.5 % (ref 36–46.5)
HGB BLD-MCNC: 12.3 G/DL (ref 12–15.5)
IMM GRANULOCYTES # BLD AUTO: 0 K/MCL (ref 0–0.2)
IMM GRANULOCYTES # BLD: 0 %
LYMPHOCYTES # BLD: 1.1 K/MCL (ref 1–4.8)
LYMPHOCYTES NFR BLD: 26 %
MAGNESIUM SERPL-MCNC: 1.9 MG/DL (ref 1.7–2.4)
MCH RBC QN AUTO: 32.8 PG (ref 26–34)
MCHC RBC AUTO-ENTMCNC: 33.7 G/DL (ref 32–36.5)
MCV RBC AUTO: 97.3 FL (ref 78–100)
MONOCYTES # BLD: 0.4 K/MCL (ref 0.3–0.9)
MONOCYTES NFR BLD: 10 %
NEUTROPHILS # BLD: 2.6 K/MCL (ref 1.8–7.7)
NEUTROPHILS NFR BLD: 61 %
NRBC BLD MANUAL-RTO: 0 /100 WBC
PHOSPHATE SERPL-MCNC: 2.3 MG/DL (ref 2.4–4.7)
PLATELET # BLD AUTO: 175 K/MCL (ref 140–450)
POTASSIUM SERPL-SCNC: 3.3 MMOL/L (ref 3.4–5.1)
POTASSIUM SERPL-SCNC: 4.3 MMOL/L (ref 3.4–5.1)
PROT SERPL-MCNC: 6.4 G/DL (ref 6.4–8.2)
RBC # BLD: 3.75 MIL/MCL (ref 4–5.2)
SODIUM SERPL-SCNC: 136 MMOL/L (ref 135–145)
TSH SERPL-ACNC: 1.61 MCUNITS/ML (ref 0.35–5)
VIT B12 SERPL-MCNC: 905 PG/ML (ref 211–911)
WBC # BLD: 4.3 K/MCL (ref 4.2–11)

## 2021-04-08 PROCEDURE — 82746 ASSAY OF FOLIC ACID SERUM: CPT | Performed by: INTERNAL MEDICINE

## 2021-04-08 PROCEDURE — 36415 COLL VENOUS BLD VENIPUNCTURE: CPT | Performed by: INTERNAL MEDICINE

## 2021-04-08 PROCEDURE — 10002800 HB RX 250 W HCPCS: Performed by: INTERNAL MEDICINE

## 2021-04-08 PROCEDURE — 90791 PSYCH DIAGNOSTIC EVALUATION: CPT | Performed by: PSYCHOLOGIST

## 2021-04-08 PROCEDURE — 83735 ASSAY OF MAGNESIUM: CPT | Performed by: INTERNAL MEDICINE

## 2021-04-08 PROCEDURE — 80053 COMPREHEN METABOLIC PANEL: CPT | Performed by: INTERNAL MEDICINE

## 2021-04-08 PROCEDURE — 10004651 HB RX, NO CHARGE ITEM: Performed by: INTERNAL MEDICINE

## 2021-04-08 PROCEDURE — 84100 ASSAY OF PHOSPHORUS: CPT | Performed by: INTERNAL MEDICINE

## 2021-04-08 PROCEDURE — 99254 IP/OBS CNSLTJ NEW/EST MOD 60: CPT | Performed by: NURSE PRACTITIONER

## 2021-04-08 PROCEDURE — 84443 ASSAY THYROID STIM HORMONE: CPT | Performed by: INTERNAL MEDICINE

## 2021-04-08 PROCEDURE — 10002803 HB RX 637: Performed by: INTERNAL MEDICINE

## 2021-04-08 PROCEDURE — 10006031 HB ROOM CHARGE TELEMETRY

## 2021-04-08 PROCEDURE — 85025 COMPLETE CBC W/AUTO DIFF WBC: CPT | Performed by: INTERNAL MEDICINE

## 2021-04-08 PROCEDURE — 99232 SBSQ HOSP IP/OBS MODERATE 35: CPT | Performed by: INTERNAL MEDICINE

## 2021-04-08 PROCEDURE — 84132 ASSAY OF SERUM POTASSIUM: CPT | Performed by: INTERNAL MEDICINE

## 2021-04-08 RX ORDER — HYDROXYZINE HYDROCHLORIDE 25 MG/1
25 TABLET, FILM COATED ORAL EVERY 6 HOURS PRN
Status: DISCONTINUED | OUTPATIENT
Start: 2021-04-08 | End: 2021-04-12 | Stop reason: HOSPADM

## 2021-04-08 RX ORDER — POTASSIUM CHLORIDE 20 MEQ/1
40 TABLET, EXTENDED RELEASE ORAL ONCE
Status: COMPLETED | OUTPATIENT
Start: 2021-04-08 | End: 2021-04-08

## 2021-04-08 RX ORDER — NALTREXONE HYDROCHLORIDE 50 MG/1
50 TABLET, FILM COATED ORAL DAILY
Status: DISCONTINUED | OUTPATIENT
Start: 2021-04-08 | End: 2021-04-12 | Stop reason: HOSPADM

## 2021-04-08 RX ADMIN — SODIUM CHLORIDE, PRESERVATIVE FREE 2 ML: 5 INJECTION INTRAVENOUS at 08:51

## 2021-04-08 RX ADMIN — GABAPENTIN 300 MG: 300 CAPSULE ORAL at 13:19

## 2021-04-08 RX ADMIN — SODIUM CHLORIDE, PRESERVATIVE FREE 2 ML: 5 INJECTION INTRAVENOUS at 21:54

## 2021-04-08 RX ADMIN — GABAPENTIN 300 MG: 300 CAPSULE ORAL at 21:28

## 2021-04-08 RX ADMIN — TRAZODONE HYDROCHLORIDE 150 MG: 50 TABLET ORAL at 21:27

## 2021-04-08 RX ADMIN — LEVETIRACETAM 500 MG: 500 TABLET, FILM COATED ORAL at 21:28

## 2021-04-08 RX ADMIN — HYDROXYZINE HYDROCHLORIDE 25 MG: 25 TABLET ORAL at 14:02

## 2021-04-08 RX ADMIN — ENOXAPARIN SODIUM 40 MG: 40 INJECTION SUBCUTANEOUS at 21:29

## 2021-04-08 RX ADMIN — FAMOTIDINE 20 MG: 20 TABLET, FILM COATED ORAL at 21:29

## 2021-04-08 RX ADMIN — LEVETIRACETAM 500 MG: 500 TABLET, FILM COATED ORAL at 08:51

## 2021-04-08 RX ADMIN — GABAPENTIN 300 MG: 300 CAPSULE ORAL at 00:34

## 2021-04-08 RX ADMIN — FOLIC ACID 1 MG: 1 TABLET ORAL at 08:51

## 2021-04-08 RX ADMIN — FAMOTIDINE 20 MG: 20 TABLET, FILM COATED ORAL at 08:51

## 2021-04-08 RX ADMIN — SERTRALINE HYDROCHLORIDE 150 MG: 100 TABLET, FILM COATED ORAL at 08:51

## 2021-04-08 RX ADMIN — GABAPENTIN 300 MG: 300 CAPSULE ORAL at 05:45

## 2021-04-08 RX ADMIN — POTASSIUM CHLORIDE 40 MEQ: 1500 TABLET, EXTENDED RELEASE ORAL at 05:45

## 2021-04-08 RX ADMIN — LORAZEPAM 3 MG: 2 INJECTION INTRAMUSCULAR; INTRAVENOUS at 00:35

## 2021-04-08 RX ADMIN — Medication 100 MG: at 08:51

## 2021-04-08 ASSESSMENT — LIFESTYLE VARIABLES
NAUSEA AND VOMITING: NO NAUSEA AND NO VOMITING
TREMOR: NO TREMOR
ANXIETY: MILDLY ANXIOUS
AGITATION: NORMAL ACTIVITY
ANXIETY: NO ANXIETY, AT EASE
NAUSEA AND VOMITING: NO NAUSEA AND NO VOMITING
PAROXYSMAL SWEATS: NO SWEAT VISIBLE
PAROXYSMAL SWEATS: BARELY PERCEPTIBLE SWEATING, PALMS MOIST
TACTILE DISTURBANCES: NOT PRESENT
VISUAL DISTURBANCES: NOT PRESENT
NAUSEA AND VOMITING: NO NAUSEA AND NO VOMITING
AGITATION: NORMAL ACTIVITY
TREMOR: NO TREMOR
NAUSEA AND VOMITING: NO NAUSEA AND NO VOMITING
ANXIETY: MODERATELY ANXIOUS
TACTILE DISTURBANCES: NOT PRESENT
PAROXYSMAL SWEATS: BARELY PERCEPTIBLE SWEATING, PALMS MOIST
PAROXYSMAL SWEATS: NO SWEAT VISIBLE
TREMOR: NOT VISIBLE, BUT CAN BE FELT FINGERTIP TO FINGERTIP
AUDITORY DISTURBANCES: NOT PRESENT
TREMOR: 2
PAROXYSMAL SWEATS: NO SWEAT VISIBLE
AUDITORY DISTURBANCES: NOT PRESENT
VISUAL DISTURBANCES: NOT PRESENT
NAUSEA AND VOMITING: NO NAUSEA AND NO VOMITING
PAROXYSMAL SWEATS: 2
VISUAL DISTURBANCES: MODERATE
AGITATION: NORMAL ACTIVITY
NAUSEA AND VOMITING: NO NAUSEA AND NO VOMITING
TREMOR: NO TREMOR
ANXIETY: NO ANXIETY, AT EASE
AUDITORY DISTURBANCES: NOT PRESENT
ANXIETY: NO ANXIETY, AT EASE
HEADACHE, FULLNESS IN HEAD: NOT PRESENT
AUDITORY DISTURBANCES: NOT PRESENT
AGITATION: NORMAL ACTIVITY
TACTILE DISTURBANCES: NOT PRESENT
PAROXYSMAL SWEATS: NO SWEAT VISIBLE
TACTILE DISTURBANCES: NOT PRESENT
TREMOR: NO TREMOR
ANXIETY: NO ANXIETY, AT EASE
HEADACHE, FULLNESS IN HEAD: NOT PRESENT
VISUAL DISTURBANCES: NOT PRESENT
AGITATION: NORMAL ACTIVITY
AUDITORY DISTURBANCES: NOT PRESENT
TREMOR: NO TREMOR
AGITATION: NORMAL ACTIVITY
TACTILE DISTURBANCES: NOT PRESENT
TACTILE DISTURBANCES: NOT PRESENT
AUDITORY DISTURBANCES: VERY MILD
HEADACHE, FULLNESS IN HEAD: NOT PRESENT
VISUAL DISTURBANCES: NOT PRESENT
HEADACHE, FULLNESS IN HEAD: NOT PRESENT
VISUAL DISTURBANCES: NOT PRESENT
AUDITORY DISTURBANCES: NOT PRESENT
HEADACHE, FULLNESS IN HEAD: NOT PRESENT
VISUAL DISTURBANCES: NOT PRESENT
AGITATION: NORMAL ACTIVITY
ANXIETY: NO ANXIETY, AT EASE
NAUSEA AND VOMITING: NO NAUSEA AND NO VOMITING
NAUSEA AND VOMITING: NO NAUSEA AND NO VOMITING
TACTILE DISTURBANCES: NOT PRESENT
VISUAL DISTURBANCES: NOT PRESENT
TACTILE DISTURBANCES: NOT PRESENT
NAUSEA AND VOMITING: NO NAUSEA AND NO VOMITING
TACTILE DISTURBANCES: NOT PRESENT
TREMOR: NO TREMOR
AGITATION: NORMAL ACTIVITY
VISUAL DISTURBANCES: NOT PRESENT
AUDITORY DISTURBANCES: NOT PRESENT
ANXIETY: NO ANXIETY, AT EASE
PAROXYSMAL SWEATS: NO SWEAT VISIBLE
HEADACHE, FULLNESS IN HEAD: NOT PRESENT
AGITATION: NORMAL ACTIVITY
ANXIETY: NO ANXIETY, AT EASE
AUDITORY DISTURBANCES: NOT PRESENT
TREMOR: NO TREMOR
PAROXYSMAL SWEATS: BARELY PERCEPTIBLE SWEATING, PALMS MOIST
HEADACHE, FULLNESS IN HEAD: NOT PRESENT

## 2021-04-08 ASSESSMENT — PAIN SCALES - GENERAL
PAINLEVEL_OUTOF10: 0
PAINLEVEL_OUTOF10: 0

## 2021-04-08 ASSESSMENT — COGNITIVE AND FUNCTIONAL STATUS - GENERAL
DO YOU HAVE DIFFICULTY DRESSING OR BATHING: NO
BECAUSE OF A PHYSICAL, MENTAL, OR EMOTIONAL CONDITION, DO YOU HAVE SERIOUS DIFFICULTY CONCENTRATING, REMEMBERING OR MAKING DECISIONS: NO
BECAUSE OF A PHYSICAL, MENTAL, OR EMOTIONAL CONDITION, DO YOU HAVE DIFFICULTY DOING ERRANDS ALONE: NO
DO YOU HAVE SERIOUS DIFFICULTY WALKING OR CLIMBING STAIRS: NO

## 2021-04-09 PROCEDURE — 13003287

## 2021-04-09 PROCEDURE — 10004281 HB COUNTER-STAFF TIME PER 15 MIN

## 2021-04-09 PROCEDURE — 10006031 HB ROOM CHARGE TELEMETRY

## 2021-04-09 PROCEDURE — 10004651 HB RX, NO CHARGE ITEM: Performed by: INTERNAL MEDICINE

## 2021-04-09 PROCEDURE — 10002803 HB RX 637: Performed by: INTERNAL MEDICINE

## 2021-04-09 PROCEDURE — 99232 SBSQ HOSP IP/OBS MODERATE 35: CPT | Performed by: INTERNAL MEDICINE

## 2021-04-09 PROCEDURE — 10002803 HB RX 637: Performed by: NURSE PRACTITIONER

## 2021-04-09 RX ADMIN — LEVETIRACETAM 500 MG: 500 TABLET, FILM COATED ORAL at 09:08

## 2021-04-09 RX ADMIN — LEVETIRACETAM 500 MG: 500 TABLET, FILM COATED ORAL at 21:29

## 2021-04-09 RX ADMIN — SODIUM CHLORIDE, PRESERVATIVE FREE 2 ML: 5 INJECTION INTRAVENOUS at 21:30

## 2021-04-09 RX ADMIN — HYDROXYZINE HYDROCHLORIDE 25 MG: 25 TABLET ORAL at 21:29

## 2021-04-09 RX ADMIN — SERTRALINE HYDROCHLORIDE 150 MG: 100 TABLET, FILM COATED ORAL at 09:08

## 2021-04-09 RX ADMIN — GABAPENTIN 300 MG: 300 CAPSULE ORAL at 05:08

## 2021-04-09 RX ADMIN — GABAPENTIN 300 MG: 300 CAPSULE ORAL at 16:29

## 2021-04-09 RX ADMIN — FAMOTIDINE 20 MG: 20 TABLET, FILM COATED ORAL at 21:29

## 2021-04-09 RX ADMIN — NALTREXONE HYDROCHLORIDE 50 MG: 50 TABLET, FILM COATED ORAL at 09:07

## 2021-04-09 RX ADMIN — TRAZODONE HYDROCHLORIDE 150 MG: 50 TABLET ORAL at 21:29

## 2021-04-09 RX ADMIN — FAMOTIDINE 20 MG: 20 TABLET, FILM COATED ORAL at 09:08

## 2021-04-09 RX ADMIN — AMLODIPINE BESYLATE 2.5 MG: 2.5 TABLET ORAL at 09:08

## 2021-04-09 RX ADMIN — SODIUM CHLORIDE, PRESERVATIVE FREE 2 ML: 5 INJECTION INTRAVENOUS at 09:08

## 2021-04-09 RX ADMIN — HYDROXYZINE HYDROCHLORIDE 25 MG: 25 TABLET ORAL at 15:53

## 2021-04-09 RX ADMIN — FOLIC ACID 1 MG: 1 TABLET ORAL at 09:08

## 2021-04-09 RX ADMIN — Medication 100 MG: at 09:08

## 2021-04-09 RX ADMIN — GABAPENTIN 300 MG: 300 CAPSULE ORAL at 21:29

## 2021-04-09 ASSESSMENT — LIFESTYLE VARIABLES
NAUSEA AND VOMITING: NO NAUSEA AND NO VOMITING
VISUAL DISTURBANCES: NOT PRESENT
NAUSEA AND VOMITING: NO NAUSEA AND NO VOMITING
AUDITORY DISTURBANCES: NOT PRESENT
ANXIETY: NO ANXIETY, AT EASE
PAROXYSMAL SWEATS: NO SWEAT VISIBLE
AGITATION: NORMAL ACTIVITY
PAROXYSMAL SWEATS: BARELY PERCEPTIBLE SWEATING, PALMS MOIST
NAUSEA AND VOMITING: NO NAUSEA AND NO VOMITING
PAROXYSMAL SWEATS: BARELY PERCEPTIBLE SWEATING, PALMS MOIST
TACTILE DISTURBANCES: NOT PRESENT
HEADACHE, FULLNESS IN HEAD: NOT PRESENT
NAUSEA AND VOMITING: NO NAUSEA AND NO VOMITING
TREMOR: NOT VISIBLE, BUT CAN BE FELT FINGERTIP TO FINGERTIP
ANXIETY: NO ANXIETY, AT EASE
PAROXYSMAL SWEATS: NO SWEAT VISIBLE
ANXIETY: MILDLY ANXIOUS
AGITATION: NORMAL ACTIVITY
AGITATION: NORMAL ACTIVITY
PAROXYSMAL SWEATS: BARELY PERCEPTIBLE SWEATING, PALMS MOIST
PAROXYSMAL SWEATS: NO SWEAT VISIBLE
HEADACHE, FULLNESS IN HEAD: NOT PRESENT
TREMOR: NOT VISIBLE, BUT CAN BE FELT FINGERTIP TO FINGERTIP
AUDITORY DISTURBANCES: NOT PRESENT
NAUSEA AND VOMITING: NO NAUSEA AND NO VOMITING
VISUAL DISTURBANCES: NOT PRESENT
TACTILE DISTURBANCES: NOT PRESENT
NAUSEA AND VOMITING: NO NAUSEA AND NO VOMITING
ANXIETY: NO ANXIETY, AT EASE
AGITATION: NORMAL ACTIVITY
AUDITORY DISTURBANCES: NOT PRESENT
ANXIETY: MILDLY ANXIOUS
TACTILE DISTURBANCES: NOT PRESENT
TREMOR: NO TREMOR
ANXIETY: NO ANXIETY, AT EASE
AUDITORY DISTURBANCES: NOT PRESENT
TACTILE DISTURBANCES: NOT PRESENT
HEADACHE, FULLNESS IN HEAD: NOT PRESENT
HEADACHE, FULLNESS IN HEAD: NOT PRESENT
AGITATION: NORMAL ACTIVITY
VISUAL DISTURBANCES: NOT PRESENT
HEADACHE, FULLNESS IN HEAD: NOT PRESENT
AUDITORY DISTURBANCES: NOT PRESENT
TACTILE DISTURBANCES: NOT PRESENT
TACTILE DISTURBANCES: NOT PRESENT
AUDITORY DISTURBANCES: NOT PRESENT
VISUAL DISTURBANCES: NOT PRESENT
AGITATION: NORMAL ACTIVITY
VISUAL DISTURBANCES: NOT PRESENT
AGITATION: NORMAL ACTIVITY
AUDITORY DISTURBANCES: NOT PRESENT
HEADACHE, FULLNESS IN HEAD: NOT PRESENT
TACTILE DISTURBANCES: NOT PRESENT
TREMOR: NO TREMOR
ANXIETY: NO ANXIETY, AT EASE
TREMOR: NO TREMOR
TREMOR: NOT VISIBLE, BUT CAN BE FELT FINGERTIP TO FINGERTIP
VISUAL DISTURBANCES: NOT PRESENT
TREMOR: NOT VISIBLE, BUT CAN BE FELT FINGERTIP TO FINGERTIP
PAROXYSMAL SWEATS: NO SWEAT VISIBLE
NAUSEA AND VOMITING: NO NAUSEA AND NO VOMITING
HEADACHE, FULLNESS IN HEAD: NOT PRESENT
VISUAL DISTURBANCES: NOT PRESENT

## 2021-04-09 ASSESSMENT — PAIN SCALES - GENERAL
PAINLEVEL_OUTOF10: 0
PAINLEVEL_OUTOF10: 0

## 2021-04-10 LAB
SARS-COV-2 RNA RESP QL NAA+PROBE: NOT DETECTED
SERVICE CMNT-IMP: NORMAL
SERVICE CMNT-IMP: NORMAL

## 2021-04-10 PROCEDURE — 10002803 HB RX 637: Performed by: FAMILY MEDICINE

## 2021-04-10 PROCEDURE — 10002803 HB RX 637: Performed by: INTERNAL MEDICINE

## 2021-04-10 PROCEDURE — 10002803 HB RX 637: Performed by: NURSE PRACTITIONER

## 2021-04-10 PROCEDURE — U0005 INFEC AGEN DETEC AMPLI PROBE: HCPCS | Performed by: INTERNAL MEDICINE

## 2021-04-10 PROCEDURE — 99232 SBSQ HOSP IP/OBS MODERATE 35: CPT | Performed by: INTERNAL MEDICINE

## 2021-04-10 PROCEDURE — 10000002 HB ROOM CHARGE MED SURG

## 2021-04-10 PROCEDURE — 10004651 HB RX, NO CHARGE ITEM: Performed by: INTERNAL MEDICINE

## 2021-04-10 PROCEDURE — 10002800 HB RX 250 W HCPCS: Performed by: INTERNAL MEDICINE

## 2021-04-10 RX ORDER — GABAPENTIN 300 MG/1
300 CAPSULE ORAL EVERY 12 HOURS SCHEDULED
Status: DISPENSED | OUTPATIENT
Start: 2021-04-10 | End: 2021-04-12

## 2021-04-10 RX ORDER — GABAPENTIN 300 MG/1
300 CAPSULE ORAL EVERY 12 HOURS SCHEDULED
Status: DISCONTINUED | OUTPATIENT
Start: 2021-04-10 | End: 2021-04-10

## 2021-04-10 RX ORDER — LANOLIN ALCOHOL/MO/W.PET/CERES
3 CREAM (GRAM) TOPICAL NIGHTLY
Status: DISCONTINUED | OUTPATIENT
Start: 2021-04-10 | End: 2021-04-12 | Stop reason: HOSPADM

## 2021-04-10 RX ORDER — TRAZODONE HYDROCHLORIDE 150 MG/1
150 TABLET ORAL NIGHTLY
Qty: 30 TABLET | Refills: 0 | Status: ON HOLD | OUTPATIENT
Start: 2021-04-10 | End: 2021-05-23 | Stop reason: SDUPTHER

## 2021-04-10 RX ORDER — SERTRALINE HYDROCHLORIDE 100 MG/1
100 TABLET, FILM COATED ORAL DAILY
Qty: 30 TABLET | Refills: 0 | Status: ON HOLD | OUTPATIENT
Start: 2021-04-10 | End: 2021-05-23 | Stop reason: HOSPADM

## 2021-04-10 RX ORDER — NALTREXONE HYDROCHLORIDE 50 MG/1
50 TABLET, FILM COATED ORAL DAILY
Qty: 30 TABLET | Refills: 0 | Status: ON HOLD | OUTPATIENT
Start: 2021-04-11 | End: 2021-05-23 | Stop reason: HOSPADM

## 2021-04-10 RX ORDER — LEVETIRACETAM 500 MG/1
500 TABLET ORAL EVERY 12 HOURS SCHEDULED
Qty: 60 TABLET | Refills: 0 | Status: ON HOLD | OUTPATIENT
Start: 2021-04-10 | End: 2021-05-23 | Stop reason: SDUPTHER

## 2021-04-10 RX ORDER — HYDROXYZINE HYDROCHLORIDE 25 MG/1
25 TABLET, FILM COATED ORAL EVERY 8 HOURS PRN
Qty: 50 TABLET | Refills: 0 | Status: ON HOLD | OUTPATIENT
Start: 2021-04-10 | End: 2021-05-23 | Stop reason: HOSPADM

## 2021-04-10 RX ORDER — AMLODIPINE BESYLATE 2.5 MG/1
2.5 TABLET ORAL DAILY
Qty: 30 TABLET | Refills: 0 | Status: ON HOLD | OUTPATIENT
Start: 2021-04-10 | End: 2021-05-23 | Stop reason: SDUPTHER

## 2021-04-10 RX ADMIN — ENOXAPARIN SODIUM 40 MG: 40 INJECTION SUBCUTANEOUS at 20:54

## 2021-04-10 RX ADMIN — LEVETIRACETAM 500 MG: 500 TABLET, FILM COATED ORAL at 09:13

## 2021-04-10 RX ADMIN — HYDROXYZINE HYDROCHLORIDE 25 MG: 25 TABLET ORAL at 18:44

## 2021-04-10 RX ADMIN — FAMOTIDINE 20 MG: 20 TABLET, FILM COATED ORAL at 09:13

## 2021-04-10 RX ADMIN — GABAPENTIN 300 MG: 300 CAPSULE ORAL at 20:55

## 2021-04-10 RX ADMIN — FAMOTIDINE 20 MG: 20 TABLET, FILM COATED ORAL at 20:54

## 2021-04-10 RX ADMIN — LEVETIRACETAM 500 MG: 500 TABLET, FILM COATED ORAL at 20:54

## 2021-04-10 RX ADMIN — HYDROXYZINE HYDROCHLORIDE 25 MG: 25 TABLET ORAL at 07:37

## 2021-04-10 RX ADMIN — SERTRALINE HYDROCHLORIDE 150 MG: 100 TABLET, FILM COATED ORAL at 09:13

## 2021-04-10 RX ADMIN — MELATONIN TAB 3 MG 3 MG: 3 TAB at 22:45

## 2021-04-10 RX ADMIN — ACETAMINOPHEN 650 MG: 325 TABLET, FILM COATED ORAL at 07:33

## 2021-04-10 RX ADMIN — NALTREXONE HYDROCHLORIDE 50 MG: 50 TABLET, FILM COATED ORAL at 09:13

## 2021-04-10 RX ADMIN — SODIUM CHLORIDE, PRESERVATIVE FREE 2 ML: 5 INJECTION INTRAVENOUS at 09:13

## 2021-04-10 RX ADMIN — TRAZODONE HYDROCHLORIDE 150 MG: 50 TABLET ORAL at 20:54

## 2021-04-10 RX ADMIN — GABAPENTIN 300 MG: 300 CAPSULE ORAL at 07:33

## 2021-04-10 RX ADMIN — SODIUM CHLORIDE, PRESERVATIVE FREE 2 ML: 5 INJECTION INTRAVENOUS at 20:56

## 2021-04-10 RX ADMIN — AMLODIPINE BESYLATE 2.5 MG: 2.5 TABLET ORAL at 09:13

## 2021-04-10 ASSESSMENT — PAIN SCALES - GENERAL
PAINLEVEL_OUTOF10: 0
PAINLEVEL_OUTOF10: 0
PAINLEVEL_OUTOF10: 4

## 2021-04-10 ASSESSMENT — LIFESTYLE VARIABLES
TREMOR: NO TREMOR
HEADACHE, FULLNESS IN HEAD: NOT PRESENT
VISUAL DISTURBANCES: NOT PRESENT
NAUSEA AND VOMITING: NO NAUSEA AND NO VOMITING
NAUSEA AND VOMITING: NO NAUSEA AND NO VOMITING
ANXIETY: 2
PAROXYSMAL SWEATS: NO SWEAT VISIBLE
AGITATION: NORMAL ACTIVITY
PAROXYSMAL SWEATS: NO SWEAT VISIBLE
AGITATION: NORMAL ACTIVITY
TACTILE DISTURBANCES: NOT PRESENT
HEADACHE, FULLNESS IN HEAD: MILD
TACTILE DISTURBANCES: NOT PRESENT
AUDITORY DISTURBANCES: NOT PRESENT
ANXIETY: NO ANXIETY, AT EASE
TREMOR: NO TREMOR
AUDITORY DISTURBANCES: NOT PRESENT
VISUAL DISTURBANCES: NOT PRESENT

## 2021-04-11 PROCEDURE — 10000002 HB ROOM CHARGE MED SURG

## 2021-04-11 PROCEDURE — 10002803 HB RX 637: Performed by: NURSE PRACTITIONER

## 2021-04-11 PROCEDURE — 10002803 HB RX 637: Performed by: FAMILY MEDICINE

## 2021-04-11 PROCEDURE — 10002803 HB RX 637: Performed by: INTERNAL MEDICINE

## 2021-04-11 PROCEDURE — 99232 SBSQ HOSP IP/OBS MODERATE 35: CPT | Performed by: INTERNAL MEDICINE

## 2021-04-11 RX ADMIN — LEVETIRACETAM 500 MG: 500 TABLET, FILM COATED ORAL at 21:14

## 2021-04-11 RX ADMIN — LEVETIRACETAM 500 MG: 500 TABLET, FILM COATED ORAL at 09:19

## 2021-04-11 RX ADMIN — MELATONIN TAB 3 MG 3 MG: 3 TAB at 21:14

## 2021-04-11 RX ADMIN — NALTREXONE HYDROCHLORIDE 50 MG: 50 TABLET, FILM COATED ORAL at 09:19

## 2021-04-11 RX ADMIN — SERTRALINE HYDROCHLORIDE 150 MG: 100 TABLET, FILM COATED ORAL at 09:19

## 2021-04-11 RX ADMIN — TRAZODONE HYDROCHLORIDE 150 MG: 50 TABLET ORAL at 21:13

## 2021-04-11 RX ADMIN — FAMOTIDINE 20 MG: 20 TABLET, FILM COATED ORAL at 09:19

## 2021-04-11 RX ADMIN — AMLODIPINE BESYLATE 2.5 MG: 2.5 TABLET ORAL at 09:19

## 2021-04-11 RX ADMIN — FAMOTIDINE 20 MG: 20 TABLET, FILM COATED ORAL at 21:13

## 2021-04-11 RX ADMIN — GABAPENTIN 300 MG: 300 CAPSULE ORAL at 09:19

## 2021-04-11 ASSESSMENT — PAIN SCALES - GENERAL
PAINLEVEL_OUTOF10: 0

## 2021-04-12 VITALS
RESPIRATION RATE: 16 BRPM | HEIGHT: 63 IN | SYSTOLIC BLOOD PRESSURE: 117 MMHG | DIASTOLIC BLOOD PRESSURE: 76 MMHG | TEMPERATURE: 97.9 F | HEART RATE: 63 BPM | OXYGEN SATURATION: 97 % | WEIGHT: 172.4 LBS | BODY MASS INDEX: 30.55 KG/M2

## 2021-04-12 PROCEDURE — 99239 HOSP IP/OBS DSCHRG MGMT >30: CPT | Performed by: INTERNAL MEDICINE

## 2021-04-12 PROCEDURE — 10002803 HB RX 637: Performed by: INTERNAL MEDICINE

## 2021-04-12 PROCEDURE — 10002803 HB RX 637: Performed by: NURSE PRACTITIONER

## 2021-04-12 RX ADMIN — FAMOTIDINE 20 MG: 20 TABLET, FILM COATED ORAL at 08:39

## 2021-04-12 RX ADMIN — SERTRALINE HYDROCHLORIDE 150 MG: 100 TABLET, FILM COATED ORAL at 08:39

## 2021-04-12 RX ADMIN — AMLODIPINE BESYLATE 2.5 MG: 2.5 TABLET ORAL at 08:39

## 2021-04-12 RX ADMIN — LEVETIRACETAM 500 MG: 500 TABLET, FILM COATED ORAL at 08:39

## 2021-04-12 RX ADMIN — NALTREXONE HYDROCHLORIDE 50 MG: 50 TABLET, FILM COATED ORAL at 08:39

## 2021-04-13 PROBLEM — E87.6 HYPOKALEMIA: Status: ACTIVE | Noted: 2021-04-13

## 2021-05-01 ENCOUNTER — HOSPITAL ENCOUNTER (EMERGENCY)
Age: 41
Discharge: HOME | End: 2021-05-02
Attending: EMERGENCY MEDICINE

## 2021-05-01 DIAGNOSIS — F10.920 ALCOHOLIC INTOXICATION WITHOUT COMPLICATION (CMD): Primary | ICD-10-CM

## 2021-05-01 PROCEDURE — 99284 EMERGENCY DEPT VISIT MOD MDM: CPT

## 2021-05-02 ENCOUNTER — HOSPITAL ENCOUNTER (EMERGENCY)
Age: 41
Discharge: HOME | End: 2021-05-02
Attending: EMERGENCY MEDICINE

## 2021-05-02 VITALS
DIASTOLIC BLOOD PRESSURE: 81 MMHG | OXYGEN SATURATION: 97 % | TEMPERATURE: 98.3 F | SYSTOLIC BLOOD PRESSURE: 115 MMHG | HEART RATE: 76 BPM | RESPIRATION RATE: 16 BRPM

## 2021-05-02 VITALS
SYSTOLIC BLOOD PRESSURE: 112 MMHG | OXYGEN SATURATION: 96 % | RESPIRATION RATE: 18 BRPM | BODY MASS INDEX: 30.12 KG/M2 | DIASTOLIC BLOOD PRESSURE: 67 MMHG | HEART RATE: 70 BPM | HEIGHT: 63 IN | WEIGHT: 170 LBS | TEMPERATURE: 98.2 F

## 2021-05-02 DIAGNOSIS — F10.920 ALCOHOLIC INTOXICATION WITHOUT COMPLICATION (CMD): Primary | ICD-10-CM

## 2021-05-02 PROCEDURE — 10002803 HB RX 637: Performed by: EMERGENCY MEDICINE

## 2021-05-02 PROCEDURE — 10004651 HB RX, NO CHARGE ITEM: Performed by: EMERGENCY MEDICINE

## 2021-05-02 PROCEDURE — 84703 CHORIONIC GONADOTROPIN ASSAY: CPT

## 2021-05-02 PROCEDURE — 99284 EMERGENCY DEPT VISIT MOD MDM: CPT

## 2021-05-02 RX ORDER — LEVETIRACETAM 500 MG/1
500 TABLET ORAL ONCE
Status: COMPLETED | OUTPATIENT
Start: 2021-05-02 | End: 2021-05-02

## 2021-05-02 RX ORDER — ACETAMINOPHEN 325 MG/1
650 TABLET ORAL ONCE
Status: COMPLETED | OUTPATIENT
Start: 2021-05-02 | End: 2021-05-02

## 2021-05-02 RX ADMIN — ACETAMINOPHEN 650 MG: 325 TABLET ORAL at 01:21

## 2021-05-02 RX ADMIN — LEVETIRACETAM 500 MG: 500 TABLET, FILM COATED ORAL at 01:21

## 2021-05-02 ASSESSMENT — ENCOUNTER SYMPTOMS
COUGH: 0
VOMITING: 0
BRUISES/BLEEDS EASILY: 0
ABDOMINAL PAIN: 0
CHILLS: 0
DIARRHEA: 0
NAUSEA: 0
ALLERGIC/IMMUNOLOGIC NEGATIVE: 1
ENDOCRINE NEGATIVE: 1
ACTIVITY CHANGE: 0
SORE THROAT: 0
EYE DISCHARGE: 0
FEVER: 0
WHEEZING: 0
PSYCHIATRIC NEGATIVE: 1

## 2021-05-03 LAB — HCG UR QL: NORMAL

## 2021-05-04 ENCOUNTER — HOSPITAL ENCOUNTER (EMERGENCY)
Age: 41
Discharge: HOME | End: 2021-05-05
Attending: EMERGENCY MEDICINE

## 2021-05-04 DIAGNOSIS — F10.20 ALCOHOLISM (CMD): Primary | ICD-10-CM

## 2021-05-04 DIAGNOSIS — Z59.00 HOMELESSNESS: ICD-10-CM

## 2021-05-04 DIAGNOSIS — F10.920 ALCOHOLIC INTOXICATION WITHOUT COMPLICATION (CMD): ICD-10-CM

## 2021-05-04 LAB
ALBUMIN SERPL-MCNC: 3.8 G/DL (ref 3.6–5.1)
ALBUMIN/GLOB SERPL: 0.9 {RATIO} (ref 1–2.4)
ALP SERPL-CCNC: 111 UNITS/L (ref 45–117)
ALT SERPL-CCNC: 27 UNITS/L
ANION GAP SERPL CALC-SCNC: 23 MMOL/L (ref 10–20)
AST SERPL-CCNC: 26 UNITS/L
BASOPHILS # BLD: 0.1 K/MCL (ref 0–0.3)
BASOPHILS NFR BLD: 1 %
BILIRUB SERPL-MCNC: 0.2 MG/DL (ref 0.2–1)
BUN SERPL-MCNC: 8 MG/DL (ref 6–20)
BUN/CREAT SERPL: 17 (ref 7–25)
CALCIUM SERPL-MCNC: 8.4 MG/DL (ref 8.4–10.2)
CHLORIDE SERPL-SCNC: 104 MMOL/L (ref 98–107)
CO2 SERPL-SCNC: 21 MMOL/L (ref 21–32)
CREAT SERPL-MCNC: 0.47 MG/DL (ref 0.51–0.95)
DEPRECATED RDW RBC: 43.4 FL (ref 39–50)
EOSINOPHIL # BLD: 0 K/MCL (ref 0–0.5)
EOSINOPHIL NFR BLD: 0 %
ERYTHROCYTE [DISTWIDTH] IN BLOOD: 12.4 % (ref 11–15)
ETHANOL SERPL-MCNC: 384 MG/DL
FASTING DURATION TIME PATIENT: ABNORMAL H
GFR SERPLBLD BASED ON 1.73 SQ M-ARVRAT: >90 ML/MIN/1.73M2
GLOBULIN SER-MCNC: 4.3 G/DL (ref 2–4)
GLUCOSE SERPL-MCNC: 110 MG/DL (ref 65–99)
HCG SERPL QL: NEGATIVE
HCT VFR BLD CALC: 43.4 % (ref 36–46.5)
HGB BLD-MCNC: 14.8 G/DL (ref 12–15.5)
IMM GRANULOCYTES # BLD AUTO: 0 K/MCL (ref 0–0.2)
IMM GRANULOCYTES # BLD: 0 %
LYMPHOCYTES # BLD: 2.2 K/MCL (ref 1–4.8)
LYMPHOCYTES NFR BLD: 18 %
MCH RBC QN AUTO: 32.4 PG (ref 26–34)
MCHC RBC AUTO-ENTMCNC: 34.1 G/DL (ref 32–36.5)
MCV RBC AUTO: 95 FL (ref 78–100)
MONOCYTES # BLD: 0.6 K/MCL (ref 0.3–0.9)
MONOCYTES NFR BLD: 4 %
NEUTROPHILS # BLD: 9.6 K/MCL (ref 1.8–7.7)
NEUTROPHILS NFR BLD: 77 %
NRBC BLD MANUAL-RTO: 0 /100 WBC
PLATELET # BLD AUTO: 313 K/MCL (ref 140–450)
POTASSIUM SERPL-SCNC: 3.7 MMOL/L (ref 3.4–5.1)
PROT SERPL-MCNC: 8.1 G/DL (ref 6.4–8.2)
RBC # BLD: 4.57 MIL/MCL (ref 4–5.2)
SODIUM SERPL-SCNC: 144 MMOL/L (ref 135–145)
WBC # BLD: 12.5 K/MCL (ref 4.2–11)

## 2021-05-04 PROCEDURE — 80053 COMPREHEN METABOLIC PANEL: CPT | Performed by: EMERGENCY MEDICINE

## 2021-05-04 PROCEDURE — 85025 COMPLETE CBC W/AUTO DIFF WBC: CPT | Performed by: EMERGENCY MEDICINE

## 2021-05-04 PROCEDURE — X1094 NO CHARGE VISIT: HCPCS | Performed by: EMERGENCY MEDICINE

## 2021-05-04 PROCEDURE — 84703 CHORIONIC GONADOTROPIN ASSAY: CPT | Performed by: EMERGENCY MEDICINE

## 2021-05-04 PROCEDURE — 99284 EMERGENCY DEPT VISIT MOD MDM: CPT

## 2021-05-04 PROCEDURE — 82077 ASSAY SPEC XCP UR&BREATH IA: CPT | Performed by: EMERGENCY MEDICINE

## 2021-05-04 PROCEDURE — 99284 EMERGENCY DEPT VISIT MOD MDM: CPT | Performed by: EMERGENCY MEDICINE

## 2021-05-04 PROCEDURE — 36415 COLL VENOUS BLD VENIPUNCTURE: CPT

## 2021-05-04 SDOH — ECONOMIC STABILITY - HOUSING INSECURITY: HOMELESSNESS UNSPECIFIED: Z59.00

## 2021-05-04 ASSESSMENT — ENCOUNTER SYMPTOMS
NUMBNESS: 0
FACIAL ASYMMETRY: 0
EYE ITCHING: 0
TROUBLE SWALLOWING: 0
RECTAL PAIN: 0
DIAPHORESIS: 0
VOICE CHANGE: 0
APPETITE CHANGE: 0
LIGHT-HEADEDNESS: 0
CONSTIPATION: 0
WHEEZING: 0
BACK PAIN: 0
SINUS PAIN: 0
STRIDOR: 0
EYE PAIN: 0
COLOR CHANGE: 0
CHILLS: 0
DIARRHEA: 0
DIZZINESS: 0
NAUSEA: 0
EYE DISCHARGE: 0
RHINORRHEA: 0
FACIAL SWELLING: 0
VOMITING: 0
PHOTOPHOBIA: 0
EYE REDNESS: 0
SHORTNESS OF BREATH: 0
ABDOMINAL PAIN: 0
SEIZURES: 0
BLOOD IN STOOL: 0
FEVER: 0
SPEECH DIFFICULTY: 0
COUGH: 0
HEADACHES: 0
SORE THROAT: 0
SINUS PRESSURE: 0
ACTIVITY CHANGE: 0
ABDOMINAL DISTENTION: 0
CHEST TIGHTNESS: 0
FATIGUE: 0

## 2021-05-04 ASSESSMENT — PAIN SCALES - GENERAL: PAINLEVEL_OUTOF10: 0

## 2021-05-05 VITALS
HEART RATE: 101 BPM | SYSTOLIC BLOOD PRESSURE: 143 MMHG | DIASTOLIC BLOOD PRESSURE: 94 MMHG | RESPIRATION RATE: 18 BRPM | OXYGEN SATURATION: 95 % | TEMPERATURE: 97.5 F

## 2021-05-05 LAB
AMPHETAMINES UR QL SCN>500 NG/ML: NEGATIVE
BARBITURATES UR QL SCN>200 NG/ML: NEGATIVE
BENZODIAZ UR QL SCN>200 NG/ML: NEGATIVE
BZE UR QL SCN>150 NG/ML: NEGATIVE
CANNABINOIDS UR QL SCN>50 NG/ML: NEGATIVE
OPIATES UR QL SCN>300 NG/ML: NEGATIVE
PCP UR QL SCN>25 NG/ML: NEGATIVE
RAINBOW EXTRA TUBES HOLD SPECIMEN: NORMAL
RAINBOW EXTRA TUBES HOLD SPECIMEN: NORMAL

## 2021-05-05 PROCEDURE — 10002803 HB RX 637: Performed by: EMERGENCY MEDICINE

## 2021-05-05 PROCEDURE — 80307 DRUG TEST PRSMV CHEM ANLYZR: CPT | Performed by: EMERGENCY MEDICINE

## 2021-05-05 RX ORDER — IBUPROFEN 600 MG/1
600 TABLET ORAL ONCE
Status: COMPLETED | OUTPATIENT
Start: 2021-05-05 | End: 2021-05-05

## 2021-05-05 RX ORDER — LEVETIRACETAM 500 MG/1
500 TABLET ORAL ONCE
Status: COMPLETED | OUTPATIENT
Start: 2021-05-05 | End: 2021-05-05

## 2021-05-05 RX ADMIN — IBUPROFEN 600 MG: 600 TABLET ORAL at 02:57

## 2021-05-05 RX ADMIN — LEVETIRACETAM 500 MG: 500 TABLET, FILM COATED ORAL at 02:57

## 2021-05-11 NOTE — ED PROVIDER NOTES
Patient Seen in: [facility] AND [facility] Emergency Department      History   Patient presents with:  Eval-D    Stated Complaint: Eval-D    HPI/Subjective:   HPI    Patient is a 64-year-old female who arrives from home for detox.   She states that she had some a retractions  Ab: soft, nontender, no distension  Extremities: FROM of all extremities, no cyanosis/clubbing/edema  Neuro: CN intact, normal speech, no tremors, 5/5 motor strength in all extremities, no focal deficits  SKIN: warm, dry, no rashes         ED NOTE:  Work-up unremarkable aside from elevated alcohol. CIWA 5. Patient resting comfortably on reassessment. Patient given IV fluids and banana bag. Discussed with patient her results and no evidence of withdrawal at this time.   She agrees that she ca

## 2021-05-11 NOTE — CM/SW NOTE
Bessy Joaquin called [facility] for assistance in arranging discharge transportation for patient whom will be ready for discharge at 0900, also per Bessy Joaquin patient is homeless. ERC met with patient. Per patient she is homeless.  Asked patient where she stayed the nig

## 2021-05-11 NOTE — CM/SW NOTE
Per patient her boyfriend Viktoria Fuller is going to pick her up in approx 1 hour. Offered patient homeless resources - pt accepted. Provided patient with multiple toiletries, 3 outfits and 4 pairs of socks from the care closet - patient very grateful.  Patient vomiti

## 2021-05-11 NOTE — ED QUICK NOTES
Patient states that she is drinking daily. Cannot recall how much she drank and when was her last drink. Patient states that she has a history of seizures and takes Keppra.

## 2021-05-11 NOTE — ED INITIAL ASSESSMENT (HPI)
Pt presents to ED for detox. Pt states [facility] the [facility]. Pt states her last drink was 3 hr ago. Pt states she can't remember what time she started drinking. Pt restless in triage.  Pt denies N/V.

## 2021-05-17 ENCOUNTER — HOSPITAL ENCOUNTER (INPATIENT)
Age: 41
LOS: 6 days | Discharge: HOME | End: 2021-05-24
Attending: EMERGENCY MEDICINE | Admitting: INTERNAL MEDICINE

## 2021-05-17 DIAGNOSIS — F10.930 ALCOHOL WITHDRAWAL SYNDROME WITHOUT COMPLICATION (CMD): Primary | ICD-10-CM

## 2021-05-17 LAB
APAP SERPL-MCNC: <2 MCG/ML (ref 10–30)
BASOPHILS # BLD: 0.1 K/MCL (ref 0–0.3)
BASOPHILS NFR BLD: 1 %
DEPRECATED RDW RBC: 44.1 FL (ref 39–50)
EOSINOPHIL # BLD: 0 K/MCL (ref 0–0.5)
EOSINOPHIL NFR BLD: 0 %
ERYTHROCYTE [DISTWIDTH] IN BLOOD: 13.2 % (ref 11–15)
HCT VFR BLD CALC: 40.5 % (ref 36–46.5)
HGB BLD-MCNC: 13.9 G/DL (ref 12–15.5)
IMM GRANULOCYTES # BLD AUTO: 0 K/MCL (ref 0–0.2)
IMM GRANULOCYTES # BLD: 0 %
LYMPHOCYTES # BLD: 2.1 K/MCL (ref 1–4.8)
LYMPHOCYTES NFR BLD: 32 %
MCH RBC QN AUTO: 31.7 PG (ref 26–34)
MCHC RBC AUTO-ENTMCNC: 34.3 G/DL (ref 32–36.5)
MCV RBC AUTO: 92.3 FL (ref 78–100)
MONOCYTES # BLD: 0.5 K/MCL (ref 0.3–0.9)
MONOCYTES NFR BLD: 8 %
NEUTROPHILS # BLD: 3.8 K/MCL (ref 1.8–7.7)
NEUTROPHILS NFR BLD: 59 %
NRBC BLD MANUAL-RTO: 0 /100 WBC
PLATELET # BLD AUTO: 178 K/MCL (ref 140–450)
RBC # BLD: 4.39 MIL/MCL (ref 4–5.2)
SALICYLATES SERPL-MCNC: <2.8 MG/DL
WBC # BLD: 6.4 K/MCL (ref 4.2–11)

## 2021-05-17 PROCEDURE — 10002807 HB RX 258: Performed by: EMERGENCY MEDICINE

## 2021-05-17 PROCEDURE — 96360 HYDRATION IV INFUSION INIT: CPT

## 2021-05-17 PROCEDURE — 93005 ELECTROCARDIOGRAM TRACING: CPT | Performed by: EMERGENCY MEDICINE

## 2021-05-17 PROCEDURE — 86901 BLOOD TYPING SEROLOGIC RH(D): CPT | Performed by: EMERGENCY MEDICINE

## 2021-05-17 PROCEDURE — 80143 DRUG ASSAY ACETAMINOPHEN: CPT | Performed by: EMERGENCY MEDICINE

## 2021-05-17 PROCEDURE — 80053 COMPREHEN METABOLIC PANEL: CPT | Performed by: EMERGENCY MEDICINE

## 2021-05-17 PROCEDURE — 10002803 HB RX 637: Performed by: EMERGENCY MEDICINE

## 2021-05-17 PROCEDURE — 85025 COMPLETE CBC W/AUTO DIFF WBC: CPT | Performed by: EMERGENCY MEDICINE

## 2021-05-17 PROCEDURE — 83690 ASSAY OF LIPASE: CPT | Performed by: EMERGENCY MEDICINE

## 2021-05-17 PROCEDURE — 84703 CHORIONIC GONADOTROPIN ASSAY: CPT | Performed by: EMERGENCY MEDICINE

## 2021-05-17 PROCEDURE — 83735 ASSAY OF MAGNESIUM: CPT | Performed by: EMERGENCY MEDICINE

## 2021-05-17 PROCEDURE — 99284 EMERGENCY DEPT VISIT MOD MDM: CPT

## 2021-05-17 PROCEDURE — HZ2ZZZZ DETOXIFICATION SERVICES FOR SUBSTANCE ABUSE TREATMENT: ICD-10-PCS | Performed by: INTERNAL MEDICINE

## 2021-05-17 PROCEDURE — 82077 ASSAY SPEC XCP UR&BREATH IA: CPT | Performed by: EMERGENCY MEDICINE

## 2021-05-17 PROCEDURE — 99285 EMERGENCY DEPT VISIT HI MDM: CPT | Performed by: EMERGENCY MEDICINE

## 2021-05-17 PROCEDURE — 80179 DRUG ASSAY SALICYLATE: CPT | Performed by: EMERGENCY MEDICINE

## 2021-05-17 RX ORDER — FOLIC ACID 1 MG/1
1 TABLET ORAL ONCE
Status: COMPLETED | OUTPATIENT
Start: 2021-05-17 | End: 2021-05-17

## 2021-05-17 RX ORDER — LANOLIN ALCOHOL/MO/W.PET/CERES
400 CREAM (GRAM) TOPICAL DAILY
Status: DISCONTINUED | OUTPATIENT
Start: 2021-05-18 | End: 2021-05-24 | Stop reason: HOSPADM

## 2021-05-17 RX ORDER — LANOLIN ALCOHOL/MO/W.PET/CERES
100 CREAM (GRAM) TOPICAL ONCE
Status: COMPLETED | OUTPATIENT
Start: 2021-05-17 | End: 2021-05-17

## 2021-05-17 RX ADMIN — SODIUM CHLORIDE, SODIUM LACTATE, POTASSIUM CHLORIDE, AND CALCIUM CHLORIDE 1000 ML: .6; .31; .03; .02 INJECTION, SOLUTION INTRAVENOUS at 23:21

## 2021-05-17 RX ADMIN — FOLIC ACID 1 MG: 1 TABLET ORAL at 23:58

## 2021-05-17 RX ADMIN — Medication 100 MG: at 23:58

## 2021-05-17 ASSESSMENT — ENCOUNTER SYMPTOMS
WEAKNESS: 0
NERVOUS/ANXIOUS: 1
SHORTNESS OF BREATH: 0
FEVER: 0
CONFUSION: 0
EYE PAIN: 0
BACK PAIN: 0
ABDOMINAL PAIN: 0

## 2021-05-17 ASSESSMENT — PAIN SCALES - GENERAL: PAINLEVEL_OUTOF10: 0

## 2021-05-18 PROBLEM — E87.6 HYPOKALEMIA: Status: ACTIVE | Noted: 2021-05-18

## 2021-05-18 LAB
ABO + RH BLD: NORMAL
ALBUMIN SERPL-MCNC: 3.9 G/DL (ref 3.6–5.1)
ALBUMIN/GLOB SERPL: 0.9 {RATIO} (ref 1–2.4)
ALP SERPL-CCNC: 88 UNITS/L (ref 45–117)
ALT SERPL-CCNC: 49 UNITS/L
ANION GAP SERPL CALC-SCNC: 12 MMOL/L (ref 10–20)
ANION GAP SERPL CALC-SCNC: 16 MMOL/L (ref 10–20)
AST SERPL-CCNC: 69 UNITS/L
ATRIAL RATE (BPM): 109
BILIRUB SERPL-MCNC: 0.3 MG/DL (ref 0.2–1)
BLD GP AB SCN SERPL QL GEL: NEGATIVE
BUN SERPL-MCNC: 4 MG/DL (ref 6–20)
BUN SERPL-MCNC: 5 MG/DL (ref 6–20)
BUN/CREAT SERPL: 7 (ref 7–25)
BUN/CREAT SERPL: 8 (ref 7–25)
CALCIUM SERPL-MCNC: 8 MG/DL (ref 8.4–10.2)
CALCIUM SERPL-MCNC: 8.4 MG/DL (ref 8.4–10.2)
CHLORIDE SERPL-SCNC: 102 MMOL/L (ref 98–107)
CHLORIDE SERPL-SCNC: 103 MMOL/L (ref 98–107)
CO2 SERPL-SCNC: 25 MMOL/L (ref 21–32)
CO2 SERPL-SCNC: 27 MMOL/L (ref 21–32)
CREAT SERPL-MCNC: 0.5 MG/DL (ref 0.51–0.95)
CREAT SERPL-MCNC: 0.68 MG/DL (ref 0.51–0.95)
DEPRECATED RDW RBC: 45.6 FL (ref 39–50)
ERYTHROCYTE [DISTWIDTH] IN BLOOD: 13.2 % (ref 11–15)
ETHANOL SERPL-MCNC: 397 MG/DL
FASTING DURATION TIME PATIENT: ABNORMAL H
FASTING DURATION TIME PATIENT: ABNORMAL H
GFR SERPLBLD BASED ON 1.73 SQ M-ARVRAT: >90 ML/MIN/1.73M2
GFR SERPLBLD BASED ON 1.73 SQ M-ARVRAT: >90 ML/MIN/1.73M2
GLOBULIN SER-MCNC: 4.2 G/DL (ref 2–4)
GLUCOSE BLDC GLUCOMTR-MCNC: 87 MG/DL (ref 70–99)
GLUCOSE SERPL-MCNC: 100 MG/DL (ref 65–99)
GLUCOSE SERPL-MCNC: 70 MG/DL (ref 65–99)
HCG SERPL QL: NEGATIVE
HCT VFR BLD CALC: 36.4 % (ref 36–46.5)
HGB BLD-MCNC: 12.2 G/DL (ref 12–15.5)
LIPASE SERPL-CCNC: <50 UNITS/L (ref 73–393)
MAGNESIUM SERPL-MCNC: 2.2 MG/DL (ref 1.7–2.4)
MCH RBC QN AUTO: 31.6 PG (ref 26–34)
MCHC RBC AUTO-ENTMCNC: 33.5 G/DL (ref 32–36.5)
MCV RBC AUTO: 94.3 FL (ref 78–100)
NRBC BLD MANUAL-RTO: 0 /100 WBC
P AXIS (DEGREES): 58
PLATELET # BLD AUTO: 129 K/MCL (ref 140–450)
POTASSIUM SERPL-SCNC: 3.1 MMOL/L (ref 3.4–5.1)
POTASSIUM SERPL-SCNC: 3.9 MMOL/L (ref 3.4–5.1)
PR-INTERVAL (MSEC): 138
PROT SERPL-MCNC: 8.1 G/DL (ref 6.4–8.2)
QRS-INTERVAL (MSEC): 88
QT-INTERVAL (MSEC): 352
QTC: 474
R AXIS (DEGREES): 62
RAINBOW EXTRA TUBES HOLD SPECIMEN: NORMAL
RAINBOW EXTRA TUBES HOLD SPECIMEN: NORMAL
RBC # BLD: 3.86 MIL/MCL (ref 4–5.2)
REPORT TEXT: NORMAL
SARS-COV-2 RNA RESP QL NAA+PROBE: NOT DETECTED
SERVICE CMNT-IMP: NORMAL
SERVICE CMNT-IMP: NORMAL
SODIUM SERPL-SCNC: 138 MMOL/L (ref 135–145)
SODIUM SERPL-SCNC: 140 MMOL/L (ref 135–145)
T AXIS (DEGREES): 61
TYPE AND SCREEN EXPIRATION DATE: NORMAL
VENTRICULAR RATE EKG/MIN (BPM): 109
WBC # BLD: 7.6 K/MCL (ref 4.2–11)

## 2021-05-18 PROCEDURE — 85027 COMPLETE CBC AUTOMATED: CPT | Performed by: INTERNAL MEDICINE

## 2021-05-18 PROCEDURE — U0005 INFEC AGEN DETEC AMPLI PROBE: HCPCS | Performed by: EMERGENCY MEDICINE

## 2021-05-18 PROCEDURE — 10002807 HB RX 258: Performed by: EMERGENCY MEDICINE

## 2021-05-18 PROCEDURE — 10003585 HB ROOM CHARGE INTERMEDIATE CARE

## 2021-05-18 PROCEDURE — 13003287

## 2021-05-18 PROCEDURE — 10002803 HB RX 637: Performed by: NURSE PRACTITIONER

## 2021-05-18 PROCEDURE — 10002800 HB RX 250 W HCPCS: Performed by: EMERGENCY MEDICINE

## 2021-05-18 PROCEDURE — 10002800 HB RX 250 W HCPCS: Performed by: NURSE PRACTITIONER

## 2021-05-18 PROCEDURE — 80048 BASIC METABOLIC PNL TOTAL CA: CPT | Performed by: INTERNAL MEDICINE

## 2021-05-18 PROCEDURE — 99223 1ST HOSP IP/OBS HIGH 75: CPT | Performed by: INTERNAL MEDICINE

## 2021-05-18 PROCEDURE — 36415 COLL VENOUS BLD VENIPUNCTURE: CPT | Performed by: INTERNAL MEDICINE

## 2021-05-18 PROCEDURE — 10004281 HB COUNTER-STAFF TIME PER 15 MIN

## 2021-05-18 PROCEDURE — 10002800 HB RX 250 W HCPCS: Performed by: INTERNAL MEDICINE

## 2021-05-18 PROCEDURE — 10002803 HB RX 637: Performed by: EMERGENCY MEDICINE

## 2021-05-18 RX ORDER — LORAZEPAM 2 MG/ML
2 INJECTION INTRAMUSCULAR ONCE
Status: COMPLETED | OUTPATIENT
Start: 2021-05-18 | End: 2021-05-18

## 2021-05-18 RX ORDER — LORAZEPAM 2 MG/ML
3 INJECTION INTRAMUSCULAR
Status: DISCONTINUED | OUTPATIENT
Start: 2021-05-18 | End: 2021-05-24 | Stop reason: HOSPADM

## 2021-05-18 RX ORDER — TRAZODONE HYDROCHLORIDE 50 MG/1
150 TABLET ORAL NIGHTLY
Status: DISCONTINUED | OUTPATIENT
Start: 2021-05-18 | End: 2021-05-24 | Stop reason: HOSPADM

## 2021-05-18 RX ORDER — ONDANSETRON 2 MG/ML
4 INJECTION INTRAMUSCULAR; INTRAVENOUS EVERY 12 HOURS PRN
Status: DISCONTINUED | OUTPATIENT
Start: 2021-05-18 | End: 2021-05-18

## 2021-05-18 RX ORDER — LORAZEPAM 2 MG/ML
4 INJECTION INTRAMUSCULAR
Status: DISCONTINUED | OUTPATIENT
Start: 2021-05-18 | End: 2021-05-24 | Stop reason: HOSPADM

## 2021-05-18 RX ORDER — MULTIVITAMIN,THER AND MINERALS
1 TABLET ORAL DAILY
Status: DISCONTINUED | OUTPATIENT
Start: 2021-05-18 | End: 2021-05-18 | Stop reason: RX

## 2021-05-18 RX ORDER — HYDRALAZINE HYDROCHLORIDE 25 MG/1
25 TABLET, FILM COATED ORAL EVERY 4 HOURS PRN
Status: DISCONTINUED | OUTPATIENT
Start: 2021-05-18 | End: 2021-05-24 | Stop reason: HOSPADM

## 2021-05-18 RX ORDER — ONDANSETRON 4 MG/1
4 TABLET, ORALLY DISINTEGRATING ORAL EVERY 12 HOURS PRN
Status: DISCONTINUED | OUTPATIENT
Start: 2021-05-18 | End: 2021-05-18

## 2021-05-18 RX ORDER — AMLODIPINE BESYLATE 2.5 MG/1
2.5 TABLET ORAL DAILY
Status: DISCONTINUED | OUTPATIENT
Start: 2021-05-18 | End: 2021-05-24 | Stop reason: HOSPADM

## 2021-05-18 RX ORDER — LANOLIN ALCOHOL/MO/W.PET/CERES
100 CREAM (GRAM) TOPICAL DAILY
Status: DISCONTINUED | OUTPATIENT
Start: 2021-05-18 | End: 2021-05-18

## 2021-05-18 RX ORDER — LANOLIN ALCOHOL/MO/W.PET/CERES
3 CREAM (GRAM) TOPICAL NIGHTLY PRN
Status: DISCONTINUED | OUTPATIENT
Start: 2021-05-18 | End: 2021-05-24 | Stop reason: HOSPADM

## 2021-05-18 RX ORDER — LEVETIRACETAM 500 MG/1
500 TABLET ORAL EVERY 12 HOURS SCHEDULED
Status: DISCONTINUED | OUTPATIENT
Start: 2021-05-18 | End: 2021-05-24 | Stop reason: HOSPADM

## 2021-05-18 RX ORDER — ACETAMINOPHEN 325 MG/1
650 TABLET ORAL EVERY 4 HOURS PRN
Status: DISCONTINUED | OUTPATIENT
Start: 2021-05-18 | End: 2021-05-24 | Stop reason: HOSPADM

## 2021-05-18 RX ORDER — SERTRALINE HYDROCHLORIDE 100 MG/1
100 TABLET, FILM COATED ORAL DAILY
Status: DISCONTINUED | OUTPATIENT
Start: 2021-05-18 | End: 2021-05-24 | Stop reason: HOSPADM

## 2021-05-18 RX ORDER — ENOXAPARIN SODIUM 100 MG/ML
40 INJECTION SUBCUTANEOUS DAILY
Status: DISCONTINUED | OUTPATIENT
Start: 2021-05-18 | End: 2021-05-18

## 2021-05-18 RX ORDER — FOLIC ACID 1 MG/1
1 TABLET ORAL DAILY
Status: COMPLETED | OUTPATIENT
Start: 2021-05-18 | End: 2021-05-20

## 2021-05-18 RX ORDER — LANOLIN ALCOHOL/MO/W.PET/CERES
100 CREAM (GRAM) TOPICAL DAILY
Status: DISCONTINUED | OUTPATIENT
Start: 2021-05-19 | End: 2021-05-24 | Stop reason: HOSPADM

## 2021-05-18 RX ORDER — HEPARIN SODIUM 5000 [USP'U]/ML
5000 INJECTION, SOLUTION INTRAVENOUS; SUBCUTANEOUS EVERY 8 HOURS SCHEDULED
Status: DISCONTINUED | OUTPATIENT
Start: 2021-05-18 | End: 2021-05-24 | Stop reason: HOSPADM

## 2021-05-18 RX ORDER — ONDANSETRON 2 MG/ML
4 INJECTION INTRAMUSCULAR; INTRAVENOUS EVERY 4 HOURS PRN
Status: DISCONTINUED | OUTPATIENT
Start: 2021-05-18 | End: 2021-05-24 | Stop reason: HOSPADM

## 2021-05-18 RX ORDER — POTASSIUM CHLORIDE 20 MEQ/1
40 TABLET, EXTENDED RELEASE ORAL DAILY PRN
Status: DISCONTINUED | OUTPATIENT
Start: 2021-05-18 | End: 2021-05-24 | Stop reason: HOSPADM

## 2021-05-18 RX ORDER — LORAZEPAM 2 MG/ML
2 INJECTION INTRAMUSCULAR
Status: DISCONTINUED | OUTPATIENT
Start: 2021-05-18 | End: 2021-05-24 | Stop reason: HOSPADM

## 2021-05-18 RX ADMIN — HEPARIN SODIUM 5000 UNITS: 5000 INJECTION INTRAVENOUS; SUBCUTANEOUS at 13:16

## 2021-05-18 RX ADMIN — TRAZODONE HYDROCHLORIDE 150 MG: 100 TABLET ORAL at 20:18

## 2021-05-18 RX ADMIN — LORAZEPAM 3 MG: 2 INJECTION INTRAMUSCULAR; INTRAVENOUS at 10:30

## 2021-05-18 RX ADMIN — ONDANSETRON 4 MG: 2 INJECTION INTRAMUSCULAR; INTRAVENOUS at 08:32

## 2021-05-18 RX ADMIN — LEVETIRACETAM 500 MG: 500 TABLET, FILM COATED ORAL at 20:18

## 2021-05-18 RX ADMIN — LORAZEPAM 2 MG: 2 INJECTION INTRAMUSCULAR; INTRAVENOUS at 00:36

## 2021-05-18 RX ADMIN — AMLODIPINE BESYLATE 2.5 MG: 2.5 TABLET ORAL at 08:33

## 2021-05-18 RX ADMIN — LORAZEPAM 4 MG: 2 INJECTION INTRAMUSCULAR; INTRAVENOUS at 13:16

## 2021-05-18 RX ADMIN — LEVETIRACETAM 500 MG: 500 TABLET, FILM COATED ORAL at 08:33

## 2021-05-18 RX ADMIN — SERTRALINE HYDROCHLORIDE 100 MG: 100 TABLET, FILM COATED ORAL at 08:33

## 2021-05-18 RX ADMIN — LEVETIRACETAM 2000 MG: 100 INJECTION, SOLUTION INTRAVENOUS at 01:20

## 2021-05-18 RX ADMIN — LORAZEPAM 4 MG: 2 INJECTION INTRAMUSCULAR; INTRAVENOUS at 12:05

## 2021-05-18 RX ADMIN — Medication 100 MG: at 08:33

## 2021-05-18 RX ADMIN — FOLIC ACID 1 MG: 1 TABLET ORAL at 08:33

## 2021-05-18 RX ADMIN — SERTRALINE HYDROCHLORIDE 50 MG: 50 TABLET, FILM COATED ORAL at 08:33

## 2021-05-18 RX ADMIN — LORAZEPAM 2 MG: 2 INJECTION INTRAMUSCULAR; INTRAVENOUS at 20:21

## 2021-05-18 RX ADMIN — HEPARIN SODIUM 5000 UNITS: 5000 INJECTION INTRAVENOUS; SUBCUTANEOUS at 21:18

## 2021-05-18 RX ADMIN — MAGNESIUM OXIDE TAB 400 MG (240 MG ELEMENTAL MG) 400 MG: 400 (240 MG) TAB at 08:33

## 2021-05-18 RX ADMIN — LORAZEPAM 4 MG: 2 INJECTION INTRAMUSCULAR; INTRAVENOUS at 14:07

## 2021-05-18 ASSESSMENT — LIFESTYLE VARIABLES
ANXIETY: 2
HEADACHE, FULLNESS IN HEAD: NOT PRESENT
AUDITORY DISTURBANCES: NOT PRESENT
HISTORY OF PROBLEMS WHEN YOU STOP DRINKING ALCOHOL: SEIZURE;HOSPITALIZATION
VISUAL DISTURBANCES: NOT PRESENT
PAROXYSMAL SWEATS: 3
AUDITORY DISTURBANCES: NOT PRESENT
TREMOR: NO TREMOR
PAROXYSMAL SWEATS: 2
NAUSEA AND VOMITING: NO NAUSEA AND NO VOMITING
VISUAL DISTURBANCES: NOT PRESENT
TREMOR: NO TREMOR
ANXIETY: 6
HEADACHE, FULLNESS IN HEAD: NOT PRESENT
NAUSEA AND VOMITING: 3
ANXIETY: 5
ANXIETY: MILDLY ANXIOUS
AGITATION: NORMAL ACTIVITY
PAROXYSMAL SWEATS: 3
HEADACHE, FULLNESS IN HEAD: NOT PRESENT
HEADACHE, FULLNESS IN HEAD: NOT PRESENT
ANXIETY: MILDLY ANXIOUS
AGITATION: NORMAL ACTIVITY
ANXIETY: NO ANXIETY, AT EASE
TREMOR: NO TREMOR
AUDITORY DISTURBANCES: NOT PRESENT
AGITATION: SOMEWHAT MORE THAN NORMAL ACTIVITY
PRIOR ALCOHOL TREATMENT: YES
AGITATION: NORMAL ACTIVITY
HEADACHE, FULLNESS IN HEAD: NOT PRESENT
AGITATION: NORMAL ACTIVITY
PAROXYSMAL SWEATS: NO SWEAT VISIBLE
ANXIETY: NO ANXIETY, AT EASE
PAROXYSMAL SWEATS: 3
AUDITORY DISTURBANCES: NOT PRESENT
VISUAL DISTURBANCES: NOT PRESENT
TREMOR: NO TREMOR
HEADACHE, FULLNESS IN HEAD: NOT PRESENT
TREMOR: 3
HEADACHE, FULLNESS IN HEAD: NOT PRESENT
NAUSEA AND VOMITING: NO NAUSEA AND NO VOMITING
TREMOR: 3
HEADACHE, FULLNESS IN HEAD: MILD
PAROXYSMAL SWEATS: 3
HOW OFTEN DO YOU HAVE 6 OR MORE DRINKS ON ONE OCCASION: DAILY OR ALMOST DAILY
LAST DRINK YOU HAD: 48 HOURS OR LESS
VISUAL DISTURBANCES: NOT PRESENT
PAROXYSMAL SWEATS: 3
NAUSEA AND VOMITING: NO NAUSEA AND NO VOMITING
AGITATION: NORMAL ACTIVITY
PAROXYSMAL SWEATS: 2
AUDITORY DISTURBANCES: NOT PRESENT
TREMOR: 3
VISUAL DISTURBANCES: NOT PRESENT
HEADACHE, FULLNESS IN HEAD: NOT PRESENT
TREMOR: MODERATE, WITH PATIENT'S ARMS EXTENDED
HEADACHE, FULLNESS IN HEAD: NOT PRESENT
AUDITORY DISTURBANCES: NOT PRESENT
AUDITORY DISTURBANCES: NOT PRESENT
TREMOR: MODERATE, WITH PATIENT'S ARMS EXTENDED
HEADACHE, FULLNESS IN HEAD: NOT PRESENT
HOW MANY STANDARD DRINKS CONTAINING ALCOHOL DO YOU HAVE ON A TYPICAL DAY: 7 TO 9
AUDIT-C TOTAL SCORE: 11
TREMOR: NO TREMOR
AUDITORY DISTURBANCES: NOT PRESENT
ANXIETY: 2
AUDITORY DISTURBANCES: NOT PRESENT
AGITATION: NORMAL ACTIVITY
ANXIETY: MILDLY ANXIOUS
ANXIETY: NO ANXIETY, AT EASE
AGITATION: NORMAL ACTIVITY
ANXIETY: NO ANXIETY, AT EASE
AGITATION: NORMAL ACTIVITY
VISUAL DISTURBANCES: NOT PRESENT
AUDITORY DISTURBANCES: NOT PRESENT
TREMOR: MODERATE, WITH PATIENT'S ARMS EXTENDED
VISUAL DISTURBANCES: NOT PRESENT
TREMOR: 2
PAROXYSMAL SWEATS: NO SWEAT VISIBLE
VISUAL DISTURBANCES: NOT PRESENT
PAROXYSMAL SWEATS: 3
NAUSEA AND VOMITING: INTERMITTENT NAUSEA WITH DRY HEAVES
VISUAL DISTURBANCES: NOT PRESENT
NAUSEA AND VOMITING: NO NAUSEA AND NO VOMITING
HOW OFTEN DO YOU HAVE A DRINK CONTAINING ALCOHOL: 4 OR MORE TIMES PER WEEK
AGITATION: NORMAL ACTIVITY
NAUSEA AND VOMITING: NO NAUSEA AND NO VOMITING
PAROXYSMAL SWEATS: 2
HEADACHE, FULLNESS IN HEAD: NOT PRESENT
ANXIETY: NO ANXIETY, AT EASE
AGITATION: NORMAL ACTIVITY
PAROXYSMAL SWEATS: 3
AGITATION: NORMAL ACTIVITY
ANXIETY: 2
NAUSEA AND VOMITING: NO NAUSEA AND NO VOMITING
PAROXYSMAL SWEATS: 2
NAUSEA AND VOMITING: NO NAUSEA AND NO VOMITING
PAROXYSMAL SWEATS: 3
NAUSEA AND VOMITING: NO NAUSEA AND NO VOMITING
AGITATION: NORMAL ACTIVITY
PAROXYSMAL SWEATS: 3
NAUSEA AND VOMITING: 3
TREMOR: 2
VISUAL DISTURBANCES: NOT PRESENT
NAUSEA AND VOMITING: NO NAUSEA AND NO VOMITING
AGITATION: NORMAL ACTIVITY
HEADACHE, FULLNESS IN HEAD: NOT PRESENT
AUDITORY DISTURBANCES: NOT PRESENT
ANXIETY: MILDLY ANXIOUS
HEADACHE, FULLNESS IN HEAD: NOT PRESENT
NAUSEA AND VOMITING: NO NAUSEA AND NO VOMITING
VISUAL DISTURBANCES: NOT PRESENT
NAUSEA AND VOMITING: NO NAUSEA AND NO VOMITING
ANXIETY: MILDLY ANXIOUS
AGITATION: NORMAL ACTIVITY
VISUAL DISTURBANCES: NOT PRESENT
NAUSEA AND VOMITING: 3
NAUSEA AND VOMITING: NO NAUSEA AND NO VOMITING
AUDITORY DISTURBANCES: NOT PRESENT
TREMOR: 2
NAUSEA AND VOMITING: 2
TREMOR: 3
ANXIETY: 3
AUDITORY DISTURBANCES: NOT PRESENT
HEADACHE, FULLNESS IN HEAD: NOT PRESENT
TREMOR: 2
TREMOR: NO TREMOR
AUDITORY DISTURBANCES: NOT PRESENT
NAUSEA AND VOMITING: NO NAUSEA AND NO VOMITING
HEADACHE, FULLNESS IN HEAD: NOT PRESENT
AGITATION: NORMAL ACTIVITY
TREMOR: 3
PAROXYSMAL SWEATS: 3
HEADACHE, FULLNESS IN HEAD: NOT PRESENT
AGITATION: NORMAL ACTIVITY
AUDITORY DISTURBANCES: NOT PRESENT
AUDITORY DISTURBANCES: NOT PRESENT
HEADACHE, FULLNESS IN HEAD: NOT PRESENT
AUDITORY DISTURBANCES: NOT PRESENT
PAROXYSMAL SWEATS: 3
AGITATION: NORMAL ACTIVITY
AUDITORY DISTURBANCES: NOT PRESENT
PAROXYSMAL SWEATS: BARELY PERCEPTIBLE SWEATING, PALMS MOIST
TREMOR: NO TREMOR
ANXIETY: 5
ALCOHOL_USE_STATUS: BLOOD ALCOHOL INDICATING ACUTE INTOXICATION GREATER THAN 0.08
AGITATION: NORMAL ACTIVITY
ANXIETY: NO ANXIETY, AT EASE
NAUSEA AND VOMITING: NO NAUSEA AND NO VOMITING
ANXIETY: MILDLY ANXIOUS
VISUAL DISTURBANCES: NOT PRESENT
PAROXYSMAL SWEATS: 3
PAROXYSMAL SWEATS: 3
VISUAL DISTURBANCES: NOT PRESENT
VISUAL DISTURBANCES: NOT PRESENT
NAUSEA AND VOMITING: 3
HEADACHE, FULLNESS IN HEAD: NOT PRESENT
VISUAL DISTURBANCES: NOT PRESENT
AUDITORY DISTURBANCES: NOT PRESENT
HEADACHE, FULLNESS IN HEAD: NOT PRESENT
AUDITORY DISTURBANCES: NOT PRESENT
TREMOR: NO TREMOR
AGITATION: NORMAL ACTIVITY
ANXIETY: NO ANXIETY, AT EASE

## 2021-05-18 ASSESSMENT — ACTIVITIES OF DAILY LIVING (ADL)
ADL_BEFORE_ADMISSION: INDEPENDENT
RECENT_DECLINE_ADL: NO
ADL_SCORE: 12
ADL_SHORT_OF_BREATH: NO
CHRONIC_PAIN_PRESENT: NO

## 2021-05-18 ASSESSMENT — PAIN SCALES - GENERAL
PAINLEVEL_OUTOF10: 0

## 2021-05-18 ASSESSMENT — PATIENT HEALTH QUESTIONNAIRE - PHQ9
SUM OF ALL RESPONSES TO PHQ9 QUESTIONS 1 AND 2: 0
SUM OF ALL RESPONSES TO PHQ9 QUESTIONS 1 AND 2: 0
2. FEELING DOWN, DEPRESSED OR HOPELESS: NOT AT ALL
1. LITTLE INTEREST OR PLEASURE IN DOING THINGS: NOT AT ALL
CLINICAL INTERPRETATION OF PHQ9 SCORE: NO FURTHER SCREENING NEEDED
CLINICAL INTERPRETATION OF PHQ2 SCORE: NO FURTHER SCREENING NEEDED
IS PATIENT ABLE TO COMPLETE PHQ2 OR PHQ9: YES

## 2021-05-18 ASSESSMENT — COLUMBIA-SUICIDE SEVERITY RATING SCALE - C-SSRS
2. HAVE YOU ACTUALLY HAD ANY THOUGHTS OF KILLING YOURSELF?: NO
1. WITHIN THE PAST MONTH, HAVE YOU WISHED YOU WERE DEAD OR WISHED YOU COULD GO TO SLEEP AND NOT WAKE UP?: NO
IS THE PATIENT ABLE TO COMPLETE C-SSRS: YES
6. HAVE YOU EVER DONE ANYTHING, STARTED TO DO ANYTHING, OR PREPARED TO DO ANYTHING TO END YOUR LIFE?: NO

## 2021-05-18 ASSESSMENT — COGNITIVE AND FUNCTIONAL STATUS - GENERAL
BECAUSE OF A PHYSICAL, MENTAL, OR EMOTIONAL CONDITION, DO YOU HAVE DIFFICULTY DOING ERRANDS ALONE: NO
DO YOU HAVE SERIOUS DIFFICULTY WALKING OR CLIMBING STAIRS: NO
ARE YOU DEAF OR DO YOU HAVE SERIOUS DIFFICULTY  HEARING: NO
BECAUSE OF A PHYSICAL, MENTAL, OR EMOTIONAL CONDITION, DO YOU HAVE SERIOUS DIFFICULTY CONCENTRATING, REMEMBERING OR MAKING DECISIONS: NO
ARE YOU BLIND OR DO YOU HAVE SERIOUS DIFFICULTY SEEING, EVEN WHEN WEARING GLASSES: NO
DO YOU HAVE DIFFICULTY DRESSING OR BATHING: NO

## 2021-05-18 ASSESSMENT — ENCOUNTER SYMPTOMS
FATIGUE: 1
NERVOUS/ANXIOUS: 1

## 2021-05-19 LAB
ANION GAP SERPL CALC-SCNC: 12 MMOL/L (ref 10–20)
BUN SERPL-MCNC: 6 MG/DL (ref 6–20)
BUN/CREAT SERPL: 11 (ref 7–25)
CALCIUM SERPL-MCNC: 8.8 MG/DL (ref 8.4–10.2)
CHLORIDE SERPL-SCNC: 99 MMOL/L (ref 98–107)
CO2 SERPL-SCNC: 27 MMOL/L (ref 21–32)
CREAT SERPL-MCNC: 0.57 MG/DL (ref 0.51–0.95)
DEPRECATED RDW RBC: 45.9 FL (ref 39–50)
ERYTHROCYTE [DISTWIDTH] IN BLOOD: 13.4 % (ref 11–15)
FASTING DURATION TIME PATIENT: ABNORMAL H
GFR SERPLBLD BASED ON 1.73 SQ M-ARVRAT: >90 ML/MIN/1.73M2
GLUCOSE SERPL-MCNC: 115 MG/DL (ref 65–99)
HCT VFR BLD CALC: 36 % (ref 36–46.5)
HGB BLD-MCNC: 12.2 G/DL (ref 12–15.5)
MAGNESIUM SERPL-MCNC: 2.1 MG/DL (ref 1.7–2.4)
MCH RBC QN AUTO: 31.8 PG (ref 26–34)
MCHC RBC AUTO-ENTMCNC: 33.9 G/DL (ref 32–36.5)
MCV RBC AUTO: 93.8 FL (ref 78–100)
NRBC BLD MANUAL-RTO: 0 /100 WBC
PLATELET # BLD AUTO: 127 K/MCL (ref 140–450)
POTASSIUM SERPL-SCNC: 3.5 MMOL/L (ref 3.4–5.1)
RBC # BLD: 3.84 MIL/MCL (ref 4–5.2)
SODIUM SERPL-SCNC: 134 MMOL/L (ref 135–145)
WBC # BLD: 9.9 K/MCL (ref 4.2–11)

## 2021-05-19 PROCEDURE — 10002803 HB RX 637: Performed by: INTERNAL MEDICINE

## 2021-05-19 PROCEDURE — 85027 COMPLETE CBC AUTOMATED: CPT | Performed by: INTERNAL MEDICINE

## 2021-05-19 PROCEDURE — 80048 BASIC METABOLIC PNL TOTAL CA: CPT | Performed by: INTERNAL MEDICINE

## 2021-05-19 PROCEDURE — 36415 COLL VENOUS BLD VENIPUNCTURE: CPT | Performed by: INTERNAL MEDICINE

## 2021-05-19 PROCEDURE — 10006031 HB ROOM CHARGE TELEMETRY

## 2021-05-19 PROCEDURE — 10002803 HB RX 637: Performed by: NURSE PRACTITIONER

## 2021-05-19 PROCEDURE — 10002800 HB RX 250 W HCPCS: Performed by: NURSE PRACTITIONER

## 2021-05-19 PROCEDURE — 99233 SBSQ HOSP IP/OBS HIGH 50: CPT | Performed by: INTERNAL MEDICINE

## 2021-05-19 PROCEDURE — 10002800 HB RX 250 W HCPCS: Performed by: INTERNAL MEDICINE

## 2021-05-19 PROCEDURE — 83735 ASSAY OF MAGNESIUM: CPT | Performed by: INTERNAL MEDICINE

## 2021-05-19 PROCEDURE — 90791 PSYCH DIAGNOSTIC EVALUATION: CPT | Performed by: PSYCHOLOGIST

## 2021-05-19 PROCEDURE — 10002803 HB RX 637: Performed by: EMERGENCY MEDICINE

## 2021-05-19 RX ADMIN — TRAZODONE HYDROCHLORIDE 150 MG: 100 TABLET ORAL at 20:29

## 2021-05-19 RX ADMIN — MAGNESIUM OXIDE TAB 400 MG (240 MG ELEMENTAL MG) 400 MG: 400 (240 MG) TAB at 09:22

## 2021-05-19 RX ADMIN — HEPARIN SODIUM 5000 UNITS: 5000 INJECTION INTRAVENOUS; SUBCUTANEOUS at 05:22

## 2021-05-19 RX ADMIN — SERTRALINE HYDROCHLORIDE 100 MG: 100 TABLET, FILM COATED ORAL at 09:22

## 2021-05-19 RX ADMIN — LEVETIRACETAM 500 MG: 500 TABLET, FILM COATED ORAL at 09:22

## 2021-05-19 RX ADMIN — Medication 100 MG: at 09:22

## 2021-05-19 RX ADMIN — LEVETIRACETAM 500 MG: 500 TABLET, FILM COATED ORAL at 20:29

## 2021-05-19 RX ADMIN — SERTRALINE HYDROCHLORIDE 50 MG: 50 TABLET, FILM COATED ORAL at 09:25

## 2021-05-19 RX ADMIN — HEPARIN SODIUM 5000 UNITS: 5000 INJECTION INTRAVENOUS; SUBCUTANEOUS at 20:30

## 2021-05-19 RX ADMIN — LORAZEPAM 2 MG: 2 INJECTION INTRAMUSCULAR; INTRAVENOUS at 12:36

## 2021-05-19 RX ADMIN — FOLIC ACID 1 MG: 1 TABLET ORAL at 09:22

## 2021-05-19 RX ADMIN — AMLODIPINE BESYLATE 2.5 MG: 2.5 TABLET ORAL at 11:10

## 2021-05-19 RX ADMIN — HEPARIN SODIUM 5000 UNITS: 5000 INJECTION INTRAVENOUS; SUBCUTANEOUS at 15:11

## 2021-05-19 ASSESSMENT — LIFESTYLE VARIABLES
AUDITORY DISTURBANCES: NOT PRESENT
AUDITORY DISTURBANCES: NOT PRESENT
VISUAL DISTURBANCES: NOT PRESENT
HEADACHE, FULLNESS IN HEAD: NOT PRESENT
ANXIETY: NO ANXIETY, AT EASE
VISUAL DISTURBANCES: NOT PRESENT
ANXIETY: MILDLY ANXIOUS
TREMOR: 2
AUDITORY DISTURBANCES: NOT PRESENT
TREMOR: NOT VISIBLE, BUT CAN BE FELT FINGERTIP TO FINGERTIP
VISUAL DISTURBANCES: NOT PRESENT
NAUSEA AND VOMITING: NO NAUSEA AND NO VOMITING
TREMOR: 2
NAUSEA AND VOMITING: NO NAUSEA AND NO VOMITING
HEADACHE, FULLNESS IN HEAD: NOT PRESENT
HEADACHE, FULLNESS IN HEAD: NOT PRESENT
NAUSEA AND VOMITING: NO NAUSEA AND NO VOMITING
ANXIETY: NO ANXIETY, AT EASE
PAROXYSMAL SWEATS: 2
AGITATION: NORMAL ACTIVITY
HEADACHE, FULLNESS IN HEAD: VERY MILD
ANXIETY: 2
PAROXYSMAL SWEATS: 2
VISUAL DISTURBANCES: NOT PRESENT
PAROXYSMAL SWEATS: 2
TREMOR: 2
TREMOR: 2
HEADACHE, FULLNESS IN HEAD: NOT PRESENT
AUDITORY DISTURBANCES: NOT PRESENT
ANXIETY: MILDLY ANXIOUS
ANXIETY: NO ANXIETY, AT EASE
AUDITORY DISTURBANCES: NOT PRESENT
AUDITORY DISTURBANCES: NOT PRESENT
PAROXYSMAL SWEATS: 2
HEADACHE, FULLNESS IN HEAD: NOT PRESENT
PAROXYSMAL SWEATS: 2
ANXIETY: MILDLY ANXIOUS
AGITATION: NORMAL ACTIVITY
VISUAL DISTURBANCES: NOT PRESENT
TREMOR: NO TREMOR
HEADACHE, FULLNESS IN HEAD: NOT PRESENT
TREMOR: 2
HEADACHE, FULLNESS IN HEAD: NOT PRESENT
PAROXYSMAL SWEATS: 2
PAROXYSMAL SWEATS: 2
HEADACHE, FULLNESS IN HEAD: NOT PRESENT
NAUSEA AND VOMITING: NO NAUSEA AND NO VOMITING
ANXIETY: MODERATELY ANXIOUS, OR GUARDED, SO ANXIETY IS INFERRED
AUDITORY DISTURBANCES: NOT PRESENT
AGITATION: NORMAL ACTIVITY
TREMOR: 2
NAUSEA AND VOMITING: NO NAUSEA AND NO VOMITING
NAUSEA AND VOMITING: NO NAUSEA AND NO VOMITING
AGITATION: NORMAL ACTIVITY
TREMOR: 2
NAUSEA AND VOMITING: NO NAUSEA AND NO VOMITING
VISUAL DISTURBANCES: NOT PRESENT
AGITATION: NORMAL ACTIVITY
PAROXYSMAL SWEATS: 2
PAROXYSMAL SWEATS: NO SWEAT VISIBLE
AGITATION: NORMAL ACTIVITY
AUDITORY DISTURBANCES: NOT PRESENT
HEADACHE, FULLNESS IN HEAD: MILD
VISUAL DISTURBANCES: NOT PRESENT
NAUSEA AND VOMITING: NO NAUSEA AND NO VOMITING
AGITATION: NORMAL ACTIVITY
HEADACHE, FULLNESS IN HEAD: NOT PRESENT
AGITATION: NORMAL ACTIVITY
VISUAL DISTURBANCES: NOT PRESENT
ANXIETY: NO ANXIETY, AT EASE
PAROXYSMAL SWEATS: BEADS OF SWEAT OBVIOUS ON FOREHEAD
AGITATION: NORMAL ACTIVITY
VISUAL DISTURBANCES: NOT PRESENT
NAUSEA AND VOMITING: NO NAUSEA AND NO VOMITING
VISUAL DISTURBANCES: NOT PRESENT
NAUSEA AND VOMITING: NO NAUSEA AND NO VOMITING
AUDITORY DISTURBANCES: NOT PRESENT
NAUSEA AND VOMITING: NO NAUSEA AND NO VOMITING
TREMOR: NOT VISIBLE, BUT CAN BE FELT FINGERTIP TO FINGERTIP
AUDITORY DISTURBANCES: NOT PRESENT
PAROXYSMAL SWEATS: BARELY PERCEPTIBLE SWEATING, PALMS MOIST
ANXIETY: NO ANXIETY, AT EASE
AGITATION: NORMAL ACTIVITY
AUDITORY DISTURBANCES: NOT PRESENT
ANXIETY: NO ANXIETY, AT EASE
VISUAL DISTURBANCES: NOT PRESENT
TREMOR: NOT VISIBLE, BUT CAN BE FELT FINGERTIP TO FINGERTIP
AGITATION: NORMAL ACTIVITY

## 2021-05-19 ASSESSMENT — PAIN SCALES - GENERAL
PAINLEVEL_OUTOF10: 0

## 2021-05-20 LAB
ANION GAP SERPL CALC-SCNC: 11 MMOL/L (ref 10–20)
BUN SERPL-MCNC: 5 MG/DL (ref 6–20)
BUN/CREAT SERPL: 8 (ref 7–25)
CALCIUM SERPL-MCNC: 9 MG/DL (ref 8.4–10.2)
CHLORIDE SERPL-SCNC: 104 MMOL/L (ref 98–107)
CO2 SERPL-SCNC: 28 MMOL/L (ref 21–32)
CREAT SERPL-MCNC: 0.6 MG/DL (ref 0.51–0.95)
FASTING DURATION TIME PATIENT: ABNORMAL H
GFR SERPLBLD BASED ON 1.73 SQ M-ARVRAT: >90 ML/MIN/1.73M2
GLUCOSE SERPL-MCNC: 98 MG/DL (ref 65–99)
POTASSIUM SERPL-SCNC: 3.5 MMOL/L (ref 3.4–5.1)
POTASSIUM SERPL-SCNC: 3.9 MMOL/L (ref 3.4–5.1)
SODIUM SERPL-SCNC: 139 MMOL/L (ref 135–145)

## 2021-05-20 PROCEDURE — 13003289 HB OXYGEN THERAPY DAILY

## 2021-05-20 PROCEDURE — 10002803 HB RX 637: Performed by: INTERNAL MEDICINE

## 2021-05-20 PROCEDURE — 10002800 HB RX 250 W HCPCS: Performed by: INTERNAL MEDICINE

## 2021-05-20 PROCEDURE — 10004651 HB RX, NO CHARGE ITEM: Performed by: INTERNAL MEDICINE

## 2021-05-20 PROCEDURE — 99233 SBSQ HOSP IP/OBS HIGH 50: CPT | Performed by: INTERNAL MEDICINE

## 2021-05-20 PROCEDURE — 80048 BASIC METABOLIC PNL TOTAL CA: CPT | Performed by: INTERNAL MEDICINE

## 2021-05-20 PROCEDURE — 10006031 HB ROOM CHARGE TELEMETRY

## 2021-05-20 PROCEDURE — 36415 COLL VENOUS BLD VENIPUNCTURE: CPT | Performed by: INTERNAL MEDICINE

## 2021-05-20 PROCEDURE — 10002803 HB RX 637: Performed by: NURSE PRACTITIONER

## 2021-05-20 PROCEDURE — 10002803 HB RX 637: Performed by: EMERGENCY MEDICINE

## 2021-05-20 PROCEDURE — 84132 ASSAY OF SERUM POTASSIUM: CPT | Performed by: INTERNAL MEDICINE

## 2021-05-20 RX ORDER — POTASSIUM CHLORIDE 20 MEQ/1
40 TABLET, EXTENDED RELEASE ORAL ONCE
Status: COMPLETED | OUTPATIENT
Start: 2021-05-20 | End: 2021-05-20

## 2021-05-20 RX ORDER — ALPRAZOLAM 0.25 MG/1
0.25 TABLET ORAL ONCE
Status: COMPLETED | OUTPATIENT
Start: 2021-05-20 | End: 2021-05-20

## 2021-05-20 RX ADMIN — POTASSIUM CHLORIDE 40 MEQ: 1500 TABLET, EXTENDED RELEASE ORAL at 08:32

## 2021-05-20 RX ADMIN — LEVETIRACETAM 500 MG: 500 TABLET, FILM COATED ORAL at 08:34

## 2021-05-20 RX ADMIN — Medication 3 MG: at 20:17

## 2021-05-20 RX ADMIN — HEPARIN SODIUM 5000 UNITS: 5000 INJECTION INTRAVENOUS; SUBCUTANEOUS at 05:23

## 2021-05-20 RX ADMIN — AMLODIPINE BESYLATE 2.5 MG: 2.5 TABLET ORAL at 08:32

## 2021-05-20 RX ADMIN — ALPRAZOLAM 0.25 MG: 0.25 TABLET ORAL at 16:17

## 2021-05-20 RX ADMIN — Medication 100 MG: at 08:33

## 2021-05-20 RX ADMIN — POTASSIUM CHLORIDE 40 MEQ: 1500 TABLET, EXTENDED RELEASE ORAL at 20:17

## 2021-05-20 RX ADMIN — FOLIC ACID 1 MG: 1 TABLET ORAL at 08:33

## 2021-05-20 RX ADMIN — LEVETIRACETAM 500 MG: 500 TABLET, FILM COATED ORAL at 20:17

## 2021-05-20 RX ADMIN — MAGNESIUM OXIDE TAB 400 MG (240 MG ELEMENTAL MG) 400 MG: 400 (240 MG) TAB at 08:33

## 2021-05-20 RX ADMIN — TRAZODONE HYDROCHLORIDE 150 MG: 100 TABLET ORAL at 20:17

## 2021-05-20 RX ADMIN — SERTRALINE HYDROCHLORIDE 100 MG: 100 TABLET, FILM COATED ORAL at 08:33

## 2021-05-20 RX ADMIN — SERTRALINE HYDROCHLORIDE 50 MG: 50 TABLET, FILM COATED ORAL at 08:32

## 2021-05-20 RX ADMIN — ACETAMINOPHEN 650 MG: 325 TABLET ORAL at 20:17

## 2021-05-20 ASSESSMENT — LIFESTYLE VARIABLES
PAROXYSMAL SWEATS: NO SWEAT VISIBLE
NAUSEA AND VOMITING: NO NAUSEA AND NO VOMITING
AGITATION: NORMAL ACTIVITY
NAUSEA AND VOMITING: NO NAUSEA AND NO VOMITING
NAUSEA AND VOMITING: NO NAUSEA AND NO VOMITING
PAROXYSMAL SWEATS: NO SWEAT VISIBLE
NAUSEA AND VOMITING: NO NAUSEA AND NO VOMITING
HEADACHE, FULLNESS IN HEAD: NOT PRESENT
VISUAL DISTURBANCES: NOT PRESENT
PAROXYSMAL SWEATS: NO SWEAT VISIBLE
ANXIETY: MODERATELY ANXIOUS, OR GUARDED, SO ANXIETY IS INFERRED
ANXIETY: NO ANXIETY, AT EASE
AGITATION: NORMAL ACTIVITY
AUDITORY DISTURBANCES: NOT PRESENT
TREMOR: NO TREMOR
AUDITORY DISTURBANCES: NOT PRESENT
AUDITORY DISTURBANCES: NOT PRESENT
TREMOR: NO TREMOR
HEADACHE, FULLNESS IN HEAD: NOT PRESENT
TREMOR: NOT VISIBLE, BUT CAN BE FELT FINGERTIP TO FINGERTIP
HEADACHE, FULLNESS IN HEAD: NOT PRESENT
VISUAL DISTURBANCES: NOT PRESENT
VISUAL DISTURBANCES: NOT PRESENT
AGITATION: NORMAL ACTIVITY
ANXIETY: MODERATELY ANXIOUS, OR GUARDED, SO ANXIETY IS INFERRED
TREMOR: NO TREMOR
VISUAL DISTURBANCES: NOT PRESENT
AUDITORY DISTURBANCES: NOT PRESENT
TREMOR: NOT VISIBLE, BUT CAN BE FELT FINGERTIP TO FINGERTIP
AGITATION: NORMAL ACTIVITY
AUDITORY DISTURBANCES: NOT PRESENT
NAUSEA AND VOMITING: NO NAUSEA AND NO VOMITING
HEADACHE, FULLNESS IN HEAD: NOT PRESENT
AGITATION: NORMAL ACTIVITY
HEADACHE, FULLNESS IN HEAD: NOT PRESENT
VISUAL DISTURBANCES: NOT PRESENT
PAROXYSMAL SWEATS: NO SWEAT VISIBLE
ANXIETY: NO ANXIETY, AT EASE
PAROXYSMAL SWEATS: NO SWEAT VISIBLE
ANXIETY: NO ANXIETY, AT EASE

## 2021-05-20 ASSESSMENT — PAIN SCALES - GENERAL
PAINLEVEL_OUTOF10: 0
PAINLEVEL_OUTOF10: 0

## 2021-05-21 LAB
AMPHETAMINES UR QL SCN>500 NG/ML: NEGATIVE
APPEARANCE UR: CLEAR
BACTERIA #/AREA URNS HPF: ABNORMAL /HPF
BARBITURATES UR QL SCN>200 NG/ML: NEGATIVE
BENZODIAZ UR QL SCN>200 NG/ML: NEGATIVE
BILIRUB UR QL STRIP: NEGATIVE
BZE UR QL SCN>150 NG/ML: NEGATIVE
CANNABINOIDS UR QL SCN>50 NG/ML: NEGATIVE
COLOR UR: ABNORMAL
GLUCOSE UR STRIP-MCNC: NEGATIVE MG/DL
HGB UR QL STRIP: ABNORMAL
HYALINE CASTS #/AREA URNS LPF: ABNORMAL /LPF
KETONES UR STRIP-MCNC: NEGATIVE MG/DL
LEUKOCYTE ESTERASE UR QL STRIP: ABNORMAL
NITRITE UR QL STRIP: NEGATIVE
OPIATES UR QL SCN>300 NG/ML: NEGATIVE
PCP UR QL SCN>25 NG/ML: NEGATIVE
PH UR STRIP: 7 [PH] (ref 5–7)
PROT UR STRIP-MCNC: NEGATIVE MG/DL
RBC #/AREA URNS HPF: ABNORMAL /HPF
SP GR UR STRIP: <1.005 (ref 1–1.03)
SQUAMOUS #/AREA URNS HPF: ABNORMAL /HPF
UROBILINOGEN UR STRIP-MCNC: 0.2 MG/DL
WBC #/AREA URNS HPF: ABNORMAL /HPF

## 2021-05-21 PROCEDURE — 10004651 HB RX, NO CHARGE ITEM: Performed by: INTERNAL MEDICINE

## 2021-05-21 PROCEDURE — 99233 SBSQ HOSP IP/OBS HIGH 50: CPT | Performed by: INTERNAL MEDICINE

## 2021-05-21 PROCEDURE — 87086 URINE CULTURE/COLONY COUNT: CPT | Performed by: INTERNAL MEDICINE

## 2021-05-21 PROCEDURE — 10002803 HB RX 637: Performed by: INTERNAL MEDICINE

## 2021-05-21 PROCEDURE — 80307 DRUG TEST PRSMV CHEM ANLYZR: CPT | Performed by: INTERNAL MEDICINE

## 2021-05-21 PROCEDURE — 81001 URINALYSIS AUTO W/SCOPE: CPT | Performed by: INTERNAL MEDICINE

## 2021-05-21 PROCEDURE — 10006031 HB ROOM CHARGE TELEMETRY

## 2021-05-21 RX ADMIN — Medication 3 MG: at 21:04

## 2021-05-21 RX ADMIN — TRAZODONE HYDROCHLORIDE 150 MG: 100 TABLET ORAL at 21:03

## 2021-05-21 RX ADMIN — LEVETIRACETAM 500 MG: 500 TABLET, FILM COATED ORAL at 09:25

## 2021-05-21 RX ADMIN — MAGNESIUM OXIDE TAB 400 MG (240 MG ELEMENTAL MG) 400 MG: 400 (240 MG) TAB at 09:24

## 2021-05-21 RX ADMIN — Medication 100 MG: at 09:25

## 2021-05-21 RX ADMIN — ACETAMINOPHEN 650 MG: 325 TABLET ORAL at 21:03

## 2021-05-21 RX ADMIN — SERTRALINE HYDROCHLORIDE 50 MG: 50 TABLET, FILM COATED ORAL at 09:25

## 2021-05-21 RX ADMIN — AMLODIPINE BESYLATE 2.5 MG: 2.5 TABLET ORAL at 09:24

## 2021-05-21 RX ADMIN — LEVETIRACETAM 500 MG: 500 TABLET, FILM COATED ORAL at 21:03

## 2021-05-21 RX ADMIN — ACETAMINOPHEN 650 MG: 325 TABLET ORAL at 16:14

## 2021-05-21 RX ADMIN — SERTRALINE HYDROCHLORIDE 100 MG: 100 TABLET, FILM COATED ORAL at 09:25

## 2021-05-21 ASSESSMENT — LIFESTYLE VARIABLES
PAROXYSMAL SWEATS: NO SWEAT VISIBLE
AUDITORY DISTURBANCES: NOT PRESENT
TREMOR: NO TREMOR
HEADACHE, FULLNESS IN HEAD: NOT PRESENT
PAROXYSMAL SWEATS: BARELY PERCEPTIBLE SWEATING, PALMS MOIST
AGITATION: NORMAL ACTIVITY
AGITATION: NORMAL ACTIVITY
ANXIETY: NO ANXIETY, AT EASE
AGITATION: NORMAL ACTIVITY
TREMOR: NO TREMOR
AGITATION: NORMAL ACTIVITY
VISUAL DISTURBANCES: NOT PRESENT
HEADACHE, FULLNESS IN HEAD: NOT PRESENT
NAUSEA AND VOMITING: NO NAUSEA AND NO VOMITING
NAUSEA AND VOMITING: NO NAUSEA AND NO VOMITING
PAROXYSMAL SWEATS: NO SWEAT VISIBLE
PAROXYSMAL SWEATS: NO SWEAT VISIBLE
VISUAL DISTURBANCES: NOT PRESENT
AUDITORY DISTURBANCES: NOT PRESENT
VISUAL DISTURBANCES: NOT PRESENT
TREMOR: NO TREMOR
NAUSEA AND VOMITING: NO NAUSEA AND NO VOMITING
VISUAL DISTURBANCES: NOT PRESENT
NAUSEA AND VOMITING: NO NAUSEA AND NO VOMITING
AUDITORY DISTURBANCES: NOT PRESENT
HEADACHE, FULLNESS IN HEAD: NOT PRESENT
ANXIETY: MILDLY ANXIOUS
AUDITORY DISTURBANCES: NOT PRESENT
HEADACHE, FULLNESS IN HEAD: NOT PRESENT
ANXIETY: NO ANXIETY, AT EASE
TREMOR: NO TREMOR
ANXIETY: NO ANXIETY, AT EASE

## 2021-05-21 ASSESSMENT — PAIN SCALES - GENERAL
PAINLEVEL_OUTOF10: 1
PAINLEVEL_OUTOF10: 3
PAINLEVEL_OUTOF10: 0
PAINLEVEL_OUTOF10: 4
PAINLEVEL_OUTOF10: 0

## 2021-05-22 PROCEDURE — 10002803 HB RX 637: Performed by: INTERNAL MEDICINE

## 2021-05-22 PROCEDURE — 13003241 HB ROOM CHARGE DETOX

## 2021-05-22 PROCEDURE — 99233 SBSQ HOSP IP/OBS HIGH 50: CPT | Performed by: INTERNAL MEDICINE

## 2021-05-22 PROCEDURE — U0005 INFEC AGEN DETEC AMPLI PROBE: HCPCS | Performed by: INTERNAL MEDICINE

## 2021-05-22 RX ORDER — LORAZEPAM 1 MG/1
1 TABLET ORAL ONCE
Status: COMPLETED | OUTPATIENT
Start: 2021-05-22 | End: 2021-05-22

## 2021-05-22 RX ADMIN — SERTRALINE HYDROCHLORIDE 100 MG: 100 TABLET, FILM COATED ORAL at 08:42

## 2021-05-22 RX ADMIN — Medication 100 MG: at 08:41

## 2021-05-22 RX ADMIN — Medication 3 MG: at 22:35

## 2021-05-22 RX ADMIN — MAGNESIUM OXIDE TAB 400 MG (240 MG ELEMENTAL MG) 400 MG: 400 (240 MG) TAB at 08:41

## 2021-05-22 RX ADMIN — TRAZODONE HYDROCHLORIDE 150 MG: 100 TABLET ORAL at 22:35

## 2021-05-22 RX ADMIN — LORAZEPAM 1 MG: 1 TABLET ORAL at 20:55

## 2021-05-22 RX ADMIN — SERTRALINE HYDROCHLORIDE 50 MG: 50 TABLET, FILM COATED ORAL at 08:41

## 2021-05-22 RX ADMIN — AMLODIPINE BESYLATE 2.5 MG: 2.5 TABLET ORAL at 08:42

## 2021-05-22 RX ADMIN — LEVETIRACETAM 500 MG: 500 TABLET, FILM COATED ORAL at 08:42

## 2021-05-22 RX ADMIN — LEVETIRACETAM 500 MG: 500 TABLET, FILM COATED ORAL at 20:55

## 2021-05-22 ASSESSMENT — PAIN SCALES - GENERAL
PAINLEVEL_OUTOF10: 0
PAINLEVEL_OUTOF10: 0

## 2021-05-22 ASSESSMENT — LIFESTYLE VARIABLES
VISUAL DISTURBANCES: NOT PRESENT
TREMOR: NO TREMOR
HEADACHE, FULLNESS IN HEAD: NOT PRESENT
AGITATION: NORMAL ACTIVITY
PAROXYSMAL SWEATS: NO SWEAT VISIBLE
VISUAL DISTURBANCES: NOT PRESENT
ANXIETY: NO ANXIETY, AT EASE
TREMOR: NO TREMOR
PAROXYSMAL SWEATS: NO SWEAT VISIBLE
AUDITORY DISTURBANCES: NOT PRESENT
HEADACHE, FULLNESS IN HEAD: NOT PRESENT
NAUSEA AND VOMITING: NO NAUSEA AND NO VOMITING
NAUSEA AND VOMITING: NO NAUSEA AND NO VOMITING
VISUAL DISTURBANCES: NOT PRESENT
PAROXYSMAL SWEATS: NO SWEAT VISIBLE
AUDITORY DISTURBANCES: NOT PRESENT
AGITATION: NORMAL ACTIVITY
TREMOR: NO TREMOR
VISUAL DISTURBANCES: NOT PRESENT
ANXIETY: NO ANXIETY, AT EASE
NAUSEA AND VOMITING: NO NAUSEA AND NO VOMITING
VISUAL DISTURBANCES: NOT PRESENT
AGITATION: NORMAL ACTIVITY
AGITATION: NORMAL ACTIVITY
NAUSEA AND VOMITING: NO NAUSEA AND NO VOMITING
AUDITORY DISTURBANCES: NOT PRESENT
NAUSEA AND VOMITING: NO NAUSEA AND NO VOMITING
AGITATION: NORMAL ACTIVITY
TREMOR: NO TREMOR
ANXIETY: NO ANXIETY, AT EASE
TREMOR: NO TREMOR
HEADACHE, FULLNESS IN HEAD: NOT PRESENT
PAROXYSMAL SWEATS: NO SWEAT VISIBLE
HEADACHE, FULLNESS IN HEAD: NOT PRESENT
ANXIETY: NO ANXIETY, AT EASE
AUDITORY DISTURBANCES: NOT PRESENT
AUDITORY DISTURBANCES: NOT PRESENT
ANXIETY: NO ANXIETY, AT EASE
PAROXYSMAL SWEATS: NO SWEAT VISIBLE
HEADACHE, FULLNESS IN HEAD: NOT PRESENT

## 2021-05-23 LAB
BACTERIA UR CULT: ABNORMAL
SARS-COV-2 RNA RESP QL NAA+PROBE: NOT DETECTED
SERVICE CMNT-IMP: NORMAL
SERVICE CMNT-IMP: NORMAL

## 2021-05-23 PROCEDURE — 10002803 HB RX 637: Performed by: INTERNAL MEDICINE

## 2021-05-23 PROCEDURE — 13003241 HB ROOM CHARGE DETOX

## 2021-05-23 PROCEDURE — 99233 SBSQ HOSP IP/OBS HIGH 50: CPT | Performed by: INTERNAL MEDICINE

## 2021-05-23 PROCEDURE — 10004651 HB RX, NO CHARGE ITEM: Performed by: INTERNAL MEDICINE

## 2021-05-23 RX ORDER — LEVETIRACETAM 500 MG/1
500 TABLET ORAL EVERY 12 HOURS SCHEDULED
Qty: 60 TABLET | Refills: 0 | Status: SHIPPED | OUTPATIENT
Start: 2021-05-23 | End: 2021-06-15 | Stop reason: SDUPTHER

## 2021-05-23 RX ORDER — AMLODIPINE BESYLATE 2.5 MG/1
2.5 TABLET ORAL DAILY
Qty: 30 TABLET | Refills: 0 | Status: SHIPPED | OUTPATIENT
Start: 2021-05-23 | End: 2021-06-15 | Stop reason: CLARIF

## 2021-05-23 RX ORDER — TRAZODONE HYDROCHLORIDE 150 MG/1
150 TABLET ORAL NIGHTLY
Qty: 30 TABLET | Refills: 0 | Status: SHIPPED | OUTPATIENT
Start: 2021-05-23 | End: 2021-06-15 | Stop reason: SDUPTHER

## 2021-05-23 RX ORDER — SERTRALINE HYDROCHLORIDE 100 MG/1
100 TABLET, FILM COATED ORAL DAILY
Qty: 30 TABLET | Refills: 0 | Status: SHIPPED | OUTPATIENT
Start: 2021-05-24 | End: 2021-06-15 | Stop reason: SDUPTHER

## 2021-05-23 RX ADMIN — TRAZODONE HYDROCHLORIDE 150 MG: 100 TABLET ORAL at 21:14

## 2021-05-23 RX ADMIN — AMLODIPINE BESYLATE 2.5 MG: 2.5 TABLET ORAL at 09:15

## 2021-05-23 RX ADMIN — MAGNESIUM OXIDE TAB 400 MG (240 MG ELEMENTAL MG) 400 MG: 400 (240 MG) TAB at 09:15

## 2021-05-23 RX ADMIN — SERTRALINE HYDROCHLORIDE 50 MG: 50 TABLET, FILM COATED ORAL at 09:15

## 2021-05-23 RX ADMIN — SERTRALINE HYDROCHLORIDE 100 MG: 100 TABLET, FILM COATED ORAL at 09:15

## 2021-05-23 RX ADMIN — Medication 100 MG: at 09:15

## 2021-05-23 RX ADMIN — ACETAMINOPHEN 650 MG: 325 TABLET ORAL at 21:14

## 2021-05-23 RX ADMIN — Medication 3 MG: at 21:19

## 2021-05-23 RX ADMIN — LEVETIRACETAM 500 MG: 500 TABLET, FILM COATED ORAL at 21:14

## 2021-05-23 RX ADMIN — LEVETIRACETAM 500 MG: 500 TABLET, FILM COATED ORAL at 09:15

## 2021-05-23 ASSESSMENT — PAIN SCALES - GENERAL
PAINLEVEL_OUTOF10: 5
PAINLEVEL_OUTOF10: 5
PAINLEVEL_OUTOF10: 0
PAINLEVEL_OUTOF10: 1

## 2021-05-23 ASSESSMENT — LIFESTYLE VARIABLES
AUDITORY DISTURBANCES: NOT PRESENT
PAROXYSMAL SWEATS: NO SWEAT VISIBLE
ANXIETY: NO ANXIETY, AT EASE
TREMOR: NO TREMOR
VISUAL DISTURBANCES: NOT PRESENT
HEADACHE, FULLNESS IN HEAD: NOT PRESENT
HEADACHE, FULLNESS IN HEAD: NOT PRESENT
AGITATION: NORMAL ACTIVITY
NAUSEA AND VOMITING: NO NAUSEA AND NO VOMITING
AGITATION: NORMAL ACTIVITY
AUDITORY DISTURBANCES: NOT PRESENT
ANXIETY: NO ANXIETY, AT EASE
NAUSEA AND VOMITING: NO NAUSEA AND NO VOMITING
PAROXYSMAL SWEATS: NO SWEAT VISIBLE
VISUAL DISTURBANCES: NOT PRESENT
TREMOR: NO TREMOR

## 2021-05-24 VITALS
HEART RATE: 66 BPM | RESPIRATION RATE: 16 BRPM | HEIGHT: 64 IN | BODY MASS INDEX: 29.51 KG/M2 | WEIGHT: 172.84 LBS | OXYGEN SATURATION: 97 % | DIASTOLIC BLOOD PRESSURE: 82 MMHG | SYSTOLIC BLOOD PRESSURE: 116 MMHG | TEMPERATURE: 98.1 F

## 2021-05-24 PROCEDURE — 10002803 HB RX 637: Performed by: INTERNAL MEDICINE

## 2021-05-24 PROCEDURE — 99239 HOSP IP/OBS DSCHRG MGMT >30: CPT | Performed by: INTERNAL MEDICINE

## 2021-05-24 RX ADMIN — AMLODIPINE BESYLATE 2.5 MG: 2.5 TABLET ORAL at 08:48

## 2021-05-24 RX ADMIN — LEVETIRACETAM 500 MG: 500 TABLET, FILM COATED ORAL at 08:48

## 2021-05-24 RX ADMIN — SERTRALINE HYDROCHLORIDE 50 MG: 50 TABLET, FILM COATED ORAL at 08:48

## 2021-05-24 RX ADMIN — SERTRALINE HYDROCHLORIDE 100 MG: 100 TABLET, FILM COATED ORAL at 09:09

## 2021-05-24 RX ADMIN — MAGNESIUM OXIDE TAB 400 MG (240 MG ELEMENTAL MG) 400 MG: 400 (240 MG) TAB at 08:48

## 2021-05-24 RX ADMIN — Medication 100 MG: at 08:48

## 2021-05-24 ASSESSMENT — PAIN SCALES - GENERAL
PAINLEVEL_OUTOF10: 0

## 2021-06-07 ENCOUNTER — TELEPHONE (OUTPATIENT)
Dept: SCHEDULING | Age: 41
End: 2021-06-07

## 2021-06-08 ENCOUNTER — APPOINTMENT (OUTPATIENT)
Dept: INTERNAL MEDICINE | Age: 41
End: 2021-06-08

## 2021-06-11 ENCOUNTER — TELEPHONE (OUTPATIENT)
Dept: SCHEDULING | Age: 41
End: 2021-06-11

## 2021-06-15 ENCOUNTER — OFFICE VISIT (OUTPATIENT)
Dept: INTERNAL MEDICINE | Age: 41
End: 2021-06-15

## 2021-06-15 ENCOUNTER — LAB SERVICES (OUTPATIENT)
Dept: LAB | Age: 41
End: 2021-06-15

## 2021-06-15 VITALS
DIASTOLIC BLOOD PRESSURE: 59 MMHG | WEIGHT: 165.25 LBS | RESPIRATION RATE: 16 BRPM | SYSTOLIC BLOOD PRESSURE: 100 MMHG | OXYGEN SATURATION: 97 % | BODY MASS INDEX: 29.28 KG/M2 | HEIGHT: 63 IN | HEART RATE: 62 BPM | TEMPERATURE: 98 F

## 2021-06-15 DIAGNOSIS — L81.9 PIGMENTED SKIN LESIONS: ICD-10-CM

## 2021-06-15 DIAGNOSIS — R56.9 SEIZURES (CMD): ICD-10-CM

## 2021-06-15 DIAGNOSIS — G47.01 INSOMNIA DUE TO MEDICAL CONDITION: ICD-10-CM

## 2021-06-15 DIAGNOSIS — F33.9 EPISODE OF RECURRENT MAJOR DEPRESSIVE DISORDER, UNSPECIFIED DEPRESSION EPISODE SEVERITY (CMD): ICD-10-CM

## 2021-06-15 DIAGNOSIS — R35.0 FREQUENCY OF MICTURITION: Primary | ICD-10-CM

## 2021-06-15 DIAGNOSIS — R35.0 FREQUENCY OF MICTURITION: ICD-10-CM

## 2021-06-15 DIAGNOSIS — F41.9 ANXIETY: ICD-10-CM

## 2021-06-15 DIAGNOSIS — Z01.419 ENCOUNTER FOR GYNECOLOGICAL EXAMINATION WITHOUT ABNORMAL FINDING: Primary | ICD-10-CM

## 2021-06-15 PROCEDURE — 3078F DIAST BP <80 MM HG: CPT | Performed by: INTERNAL MEDICINE

## 2021-06-15 PROCEDURE — 3074F SYST BP LT 130 MM HG: CPT | Performed by: INTERNAL MEDICINE

## 2021-06-15 PROCEDURE — 87086 URINE CULTURE/COLONY COUNT: CPT | Performed by: INTERNAL MEDICINE

## 2021-06-15 PROCEDURE — 99215 OFFICE O/P EST HI 40 MIN: CPT | Performed by: INTERNAL MEDICINE

## 2021-06-15 RX ORDER — TRAZODONE HYDROCHLORIDE 150 MG/1
150 TABLET ORAL NIGHTLY
Qty: 30 TABLET | Refills: 5 | Status: SHIPPED | OUTPATIENT
Start: 2021-06-15 | End: 2021-08-30 | Stop reason: DRUGHIGH

## 2021-06-15 RX ORDER — SERTRALINE HYDROCHLORIDE 100 MG/1
100 TABLET, FILM COATED ORAL DAILY
Qty: 30 TABLET | Refills: 5 | Status: SHIPPED | OUTPATIENT
Start: 2021-06-15 | End: 2021-08-30

## 2021-06-15 RX ORDER — LEVETIRACETAM 500 MG/1
500 TABLET ORAL EVERY 12 HOURS SCHEDULED
Qty: 60 TABLET | Refills: 5 | Status: SHIPPED | OUTPATIENT
Start: 2021-06-15 | End: 2021-08-30 | Stop reason: DRUGHIGH

## 2021-06-15 ASSESSMENT — ENCOUNTER SYMPTOMS
LIGHT-HEADEDNESS: 0
CONFUSION: 0
BLOOD IN STOOL: 0
HALLUCINATIONS: 0
SLEEP DISTURBANCE: 0
TREMORS: 0
SHORTNESS OF BREATH: 0
SORE THROAT: 0
COUGH: 0
SEIZURES: 0
FEVER: 0
CHILLS: 0
VOMITING: 0
ADENOPATHY: 0
EYE PAIN: 0
SINUS PRESSURE: 0
CONSTIPATION: 0
ABDOMINAL PAIN: 0
DIARRHEA: 0
EYE DISCHARGE: 0
CHEST TIGHTNESS: 0
BACK PAIN: 0
APPETITE CHANGE: 0
HEADACHES: 0
DIZZINESS: 0
NERVOUS/ANXIOUS: 1
APNEA: 0
WHEEZING: 0
EYE REDNESS: 0
SINUS PAIN: 0
FATIGUE: 0
ACTIVITY CHANGE: 0
ABDOMINAL DISTENTION: 0
NAUSEA: 0

## 2021-06-15 ASSESSMENT — PATIENT HEALTH QUESTIONNAIRE - PHQ9
2. FEELING DOWN, DEPRESSED OR HOPELESS: MORE THAN HALF THE DAYS
SUM OF ALL RESPONSES TO PHQ9 QUESTIONS 1 AND 2: 2
CLINICAL INTERPRETATION OF PHQ9 SCORE: NO FURTHER SCREENING NEEDED
SUM OF ALL RESPONSES TO PHQ9 QUESTIONS 1 AND 2: 2
1. LITTLE INTEREST OR PLEASURE IN DOING THINGS: NOT AT ALL
CLINICAL INTERPRETATION OF PHQ2 SCORE: NO FURTHER SCREENING NEEDED

## 2021-06-15 ASSESSMENT — PAIN SCALES - GENERAL: PAINLEVEL: 0

## 2021-06-16 LAB — BACTERIA UR CULT: NORMAL

## 2021-06-25 ENCOUNTER — OFFICE VISIT (OUTPATIENT)
Dept: OBGYN | Age: 41
End: 2021-06-25

## 2021-06-25 ENCOUNTER — LAB SERVICES (OUTPATIENT)
Dept: LAB | Age: 41
End: 2021-06-25

## 2021-06-25 VITALS
WEIGHT: 166.56 LBS | HEART RATE: 50 BPM | TEMPERATURE: 97 F | HEIGHT: 63 IN | OXYGEN SATURATION: 98 % | BODY MASS INDEX: 29.51 KG/M2 | DIASTOLIC BLOOD PRESSURE: 72 MMHG | SYSTOLIC BLOOD PRESSURE: 116 MMHG | RESPIRATION RATE: 16 BRPM

## 2021-06-25 DIAGNOSIS — Z01.419 ENCOUNTER FOR GYNECOLOGICAL EXAMINATION WITHOUT ABNORMAL FINDING: ICD-10-CM

## 2021-06-25 DIAGNOSIS — Z01.419 ENCOUNTER FOR GYNECOLOGICAL EXAMINATION WITHOUT ABNORMAL FINDING: Primary | ICD-10-CM

## 2021-06-25 LAB — HIV 1+2 AB+HIV1 P24 AG SERPL QL IA: NONREACTIVE

## 2021-06-25 PROCEDURE — 3078F DIAST BP <80 MM HG: CPT | Performed by: STUDENT IN AN ORGANIZED HEALTH CARE EDUCATION/TRAINING PROGRAM

## 2021-06-25 PROCEDURE — 87389 HIV-1 AG W/HIV-1&-2 AB AG IA: CPT | Performed by: OBSTETRICS & GYNECOLOGY

## 2021-06-25 PROCEDURE — 86592 SYPHILIS TEST NON-TREP QUAL: CPT | Performed by: OBSTETRICS & GYNECOLOGY

## 2021-06-25 PROCEDURE — 99395 PREV VISIT EST AGE 18-39: CPT | Performed by: STUDENT IN AN ORGANIZED HEALTH CARE EDUCATION/TRAINING PROGRAM

## 2021-06-25 PROCEDURE — 36415 COLL VENOUS BLD VENIPUNCTURE: CPT | Performed by: OBSTETRICS & GYNECOLOGY

## 2021-06-25 PROCEDURE — 3074F SYST BP LT 130 MM HG: CPT | Performed by: STUDENT IN AN ORGANIZED HEALTH CARE EDUCATION/TRAINING PROGRAM

## 2021-06-25 PROCEDURE — 88175 CYTOPATH C/V AUTO FLUID REDO: CPT | Performed by: CLINICAL MEDICAL LABORATORY

## 2021-06-25 PROCEDURE — 87491 CHLMYD TRACH DNA AMP PROBE: CPT | Performed by: STUDENT IN AN ORGANIZED HEALTH CARE EDUCATION/TRAINING PROGRAM

## 2021-06-25 PROCEDURE — 87591 N.GONORRHOEAE DNA AMP PROB: CPT | Performed by: STUDENT IN AN ORGANIZED HEALTH CARE EDUCATION/TRAINING PROGRAM

## 2021-06-25 PROCEDURE — 87624 HPV HI-RISK TYP POOLED RSLT: CPT | Performed by: CLINICAL MEDICAL LABORATORY

## 2021-06-25 RX ORDER — NORETHINDRONE ACETATE AND ETHINYL ESTRADIOL .03; 1.5 MG/1; MG/1
1 TABLET ORAL DAILY
Qty: 30 TABLET | Refills: 3 | Status: SHIPPED | OUTPATIENT
Start: 2021-06-25 | End: 2021-09-28

## 2021-06-25 ASSESSMENT — PAIN SCALES - GENERAL: PAINLEVEL: 0

## 2021-06-26 LAB — RPR SER QL: NONREACTIVE

## 2021-06-28 LAB
C TRACH RRNA CVX QL NAA+PROBE: NEGATIVE
Lab: NORMAL
N GONORRHOEA RRNA CVX QL NAA+PROBE: NEGATIVE

## 2021-06-29 ENCOUNTER — TELEPHONE (OUTPATIENT)
Dept: BEHAVIORAL HEALTH | Age: 41
End: 2021-06-29

## 2021-07-06 LAB
CASE RPRT: NORMAL
CYTOLOGY CVX/VAG STUDY: NORMAL
HPV16+18+45 E6+E7MRNA CVX NAA+PROBE: POSITIVE
Lab: ABNORMAL
PAP EDUCATIONAL NOTE: NORMAL
SPECIMEN ADEQUACY: NORMAL

## 2021-07-12 ENCOUNTER — TELEPHONE (OUTPATIENT)
Dept: SCHEDULING | Age: 41
End: 2021-07-12

## 2021-07-12 DIAGNOSIS — N63.0 BREAST NODULE: Primary | ICD-10-CM

## 2021-07-12 DIAGNOSIS — Z80.3 FAMILY HISTORY OF BREAST CANCER: ICD-10-CM

## 2021-07-19 ENCOUNTER — TELEPHONE (OUTPATIENT)
Dept: SCHEDULING | Age: 41
End: 2021-07-19

## 2021-07-23 ENCOUNTER — OFFICE VISIT (OUTPATIENT)
Dept: INTERNAL MEDICINE | Age: 41
End: 2021-07-23

## 2021-07-23 VITALS
RESPIRATION RATE: 17 BRPM | HEART RATE: 62 BPM | HEIGHT: 63 IN | DIASTOLIC BLOOD PRESSURE: 70 MMHG | WEIGHT: 166 LBS | BODY MASS INDEX: 29.41 KG/M2 | SYSTOLIC BLOOD PRESSURE: 112 MMHG

## 2021-07-23 DIAGNOSIS — S13.9XXA NECK SPRAIN, INITIAL ENCOUNTER: Primary | ICD-10-CM

## 2021-07-23 PROCEDURE — 99212 OFFICE O/P EST SF 10 MIN: CPT | Performed by: INTERNAL MEDICINE

## 2021-07-23 PROCEDURE — 3078F DIAST BP <80 MM HG: CPT | Performed by: INTERNAL MEDICINE

## 2021-07-23 PROCEDURE — 3074F SYST BP LT 130 MM HG: CPT | Performed by: INTERNAL MEDICINE

## 2021-07-23 ASSESSMENT — ENCOUNTER SYMPTOMS
ACTIVITY CHANGE: 0
FATIGUE: 0
ABDOMINAL DISTENTION: 0
EYE DISCHARGE: 0
FEVER: 0
CONFUSION: 0
CHEST TIGHTNESS: 0
NAUSEA: 0
WHEEZING: 0
SINUS PAIN: 0
HALLUCINATIONS: 0
TREMORS: 0
DIZZINESS: 0
CONSTIPATION: 0
COUGH: 0
APNEA: 0
SINUS PRESSURE: 0
VOMITING: 0
DIARRHEA: 0
HEADACHES: 0
BLOOD IN STOOL: 0
SORE THROAT: 0
ABDOMINAL PAIN: 0
SLEEP DISTURBANCE: 0
EYE PAIN: 0
LIGHT-HEADEDNESS: 0
SEIZURES: 0
BACK PAIN: 0
CHILLS: 0
SHORTNESS OF BREATH: 0
APPETITE CHANGE: 0
NERVOUS/ANXIOUS: 0
EYE REDNESS: 0
ADENOPATHY: 0

## 2021-08-04 ENCOUNTER — HOSPITAL ENCOUNTER (OUTPATIENT)
Dept: MAMMOGRAPHY | Age: 41
Discharge: HOME | End: 2021-08-04
Attending: OBSTETRICS & GYNECOLOGY

## 2021-08-04 DIAGNOSIS — N63.0 BREAST NODULE: ICD-10-CM

## 2021-08-04 DIAGNOSIS — Z80.3 FAMILY HISTORY OF BREAST CANCER: ICD-10-CM

## 2021-08-04 PROCEDURE — G0279 TOMOSYNTHESIS, MAMMO: HCPCS

## 2021-08-04 PROCEDURE — 76642 ULTRASOUND BREAST LIMITED: CPT

## 2021-08-17 ENCOUNTER — HOSPITAL ENCOUNTER (EMERGENCY)
Age: 41
Discharge: HOME | End: 2021-08-18
Attending: EMERGENCY MEDICINE

## 2021-08-17 DIAGNOSIS — F10.920 ALCOHOLIC INTOXICATION WITHOUT COMPLICATION (CMD): Primary | ICD-10-CM

## 2021-08-17 PROCEDURE — 82077 ASSAY SPEC XCP UR&BREATH IA: CPT | Performed by: EMERGENCY MEDICINE

## 2021-08-17 PROCEDURE — 96372 THER/PROPH/DIAG INJ SC/IM: CPT

## 2021-08-17 PROCEDURE — 96360 HYDRATION IV INFUSION INIT: CPT

## 2021-08-17 PROCEDURE — 99285 EMERGENCY DEPT VISIT HI MDM: CPT | Performed by: EMERGENCY MEDICINE

## 2021-08-17 PROCEDURE — 99284 EMERGENCY DEPT VISIT MOD MDM: CPT

## 2021-08-17 PROCEDURE — 85025 COMPLETE CBC W/AUTO DIFF WBC: CPT | Performed by: EMERGENCY MEDICINE

## 2021-08-17 PROCEDURE — 80053 COMPREHEN METABOLIC PANEL: CPT | Performed by: EMERGENCY MEDICINE

## 2021-08-17 RX ORDER — 0.9 % SODIUM CHLORIDE 0.9 %
2 VIAL (ML) INJECTION EVERY 12 HOURS SCHEDULED
Status: DISCONTINUED | OUTPATIENT
Start: 2021-08-18 | End: 2021-08-18 | Stop reason: HOSPADM

## 2021-08-17 ASSESSMENT — ENCOUNTER SYMPTOMS
LIGHT-HEADEDNESS: 0
COLOR CHANGE: 0
WOUND: 0
DIZZINESS: 0
NAUSEA: 0
FATIGUE: 0
BLOOD IN STOOL: 0
BACK PAIN: 0
HEADACHES: 0
ABDOMINAL PAIN: 0
SHORTNESS OF BREATH: 0
DIARRHEA: 0
EYE REDNESS: 0
COUGH: 0
WHEEZING: 0
VOMITING: 0
EYE DISCHARGE: 0
SORE THROAT: 0
CONFUSION: 0
ABDOMINAL DISTENTION: 0
WEAKNESS: 0
FEVER: 0
EYE PAIN: 0
CHILLS: 0
CHEST TIGHTNESS: 0
HALLUCINATIONS: 0
CONSTIPATION: 0

## 2021-08-18 VITALS
DIASTOLIC BLOOD PRESSURE: 67 MMHG | OXYGEN SATURATION: 98 % | HEART RATE: 87 BPM | SYSTOLIC BLOOD PRESSURE: 121 MMHG | RESPIRATION RATE: 18 BRPM | TEMPERATURE: 98.6 F

## 2021-08-18 LAB
ALBUMIN SERPL-MCNC: 3.5 G/DL (ref 3.6–5.1)
ALBUMIN/GLOB SERPL: 0.9 {RATIO} (ref 1–2.4)
ALP SERPL-CCNC: 72 UNITS/L (ref 45–117)
ALT SERPL-CCNC: 20 UNITS/L
ANION GAP SERPL CALC-SCNC: 15 MMOL/L (ref 10–20)
AST SERPL-CCNC: 19 UNITS/L
BASOPHILS # BLD: 0.1 K/MCL (ref 0–0.3)
BASOPHILS NFR BLD: 1 %
BILIRUB SERPL-MCNC: 0.1 MG/DL (ref 0.2–1)
BUN SERPL-MCNC: 9 MG/DL (ref 6–20)
BUN/CREAT SERPL: 15 (ref 7–25)
CALCIUM SERPL-MCNC: 8.1 MG/DL (ref 8.4–10.2)
CHLORIDE SERPL-SCNC: 113 MMOL/L (ref 98–107)
CO2 SERPL-SCNC: 23 MMOL/L (ref 21–32)
CREAT SERPL-MCNC: 0.62 MG/DL (ref 0.51–0.95)
DEPRECATED RDW RBC: 48.5 FL (ref 39–50)
EOSINOPHIL # BLD: 0.1 K/MCL (ref 0–0.5)
EOSINOPHIL NFR BLD: 1 %
ERYTHROCYTE [DISTWIDTH] IN BLOOD: 14.1 % (ref 11–15)
ETHANOL SERPL-MCNC: 320 MG/DL
FASTING DURATION TIME PATIENT: ABNORMAL H
GFR SERPLBLD BASED ON 1.73 SQ M-ARVRAT: >90 ML/MIN/1.73M2
GLOBULIN SER-MCNC: 3.7 G/DL (ref 2–4)
GLUCOSE SERPL-MCNC: 72 MG/DL (ref 65–99)
HCT VFR BLD CALC: 40.5 % (ref 36–46.5)
HGB BLD-MCNC: 13.3 G/DL (ref 12–15.5)
IMM GRANULOCYTES # BLD AUTO: 0 K/MCL (ref 0–0.2)
IMM GRANULOCYTES # BLD: 0 %
LYMPHOCYTES # BLD: 3.1 K/MCL (ref 1–4.8)
LYMPHOCYTES NFR BLD: 38 %
MCH RBC QN AUTO: 30.3 PG (ref 26–34)
MCHC RBC AUTO-ENTMCNC: 32.8 G/DL (ref 32–36.5)
MCV RBC AUTO: 92.3 FL (ref 78–100)
MONOCYTES # BLD: 0.3 K/MCL (ref 0.3–0.9)
MONOCYTES NFR BLD: 4 %
NEUTROPHILS # BLD: 4.5 K/MCL (ref 1.8–7.7)
NEUTROPHILS NFR BLD: 56 %
NRBC BLD MANUAL-RTO: 0 /100 WBC
PLATELET # BLD AUTO: 292 K/MCL (ref 140–450)
POTASSIUM SERPL-SCNC: 3.8 MMOL/L (ref 3.4–5.1)
PROT SERPL-MCNC: 7.2 G/DL (ref 6.4–8.2)
RAINBOW EXTRA TUBES HOLD SPECIMEN: NORMAL
RAINBOW EXTRA TUBES HOLD SPECIMEN: NORMAL
RBC # BLD: 4.39 MIL/MCL (ref 4–5.2)
SODIUM SERPL-SCNC: 147 MMOL/L (ref 135–145)
WBC # BLD: 8.1 K/MCL (ref 4.2–11)

## 2021-08-18 PROCEDURE — 96372 THER/PROPH/DIAG INJ SC/IM: CPT

## 2021-08-18 PROCEDURE — X1094 NO CHARGE VISIT: HCPCS | Performed by: EMERGENCY MEDICINE

## 2021-08-18 PROCEDURE — 10002800 HB RX 250 W HCPCS

## 2021-08-18 PROCEDURE — 10002807 HB RX 258: Performed by: EMERGENCY MEDICINE

## 2021-08-18 PROCEDURE — 96360 HYDRATION IV INFUSION INIT: CPT

## 2021-08-18 RX ORDER — HALOPERIDOL 5 MG/ML
5 INJECTION INTRAMUSCULAR ONCE
Status: COMPLETED | OUTPATIENT
Start: 2021-08-18 | End: 2021-08-18

## 2021-08-18 RX ORDER — HALOPERIDOL 5 MG/ML
INJECTION INTRAMUSCULAR
Status: COMPLETED
Start: 2021-08-18 | End: 2021-08-18

## 2021-08-18 RX ORDER — LORAZEPAM 2 MG/ML
2 INJECTION INTRAMUSCULAR ONCE
Status: COMPLETED | OUTPATIENT
Start: 2021-08-18 | End: 2021-08-18

## 2021-08-18 RX ORDER — LORAZEPAM 2 MG/ML
INJECTION INTRAMUSCULAR
Status: COMPLETED
Start: 2021-08-18 | End: 2021-08-18

## 2021-08-18 RX ADMIN — LORAZEPAM 2 MG: 2 INJECTION INTRAMUSCULAR; INTRAVENOUS at 00:58

## 2021-08-18 RX ADMIN — SODIUM CHLORIDE 1000 ML: 9 INJECTION, SOLUTION INTRAVENOUS at 00:01

## 2021-08-18 RX ADMIN — LORAZEPAM 2 MG: 2 INJECTION INTRAMUSCULAR at 00:58

## 2021-08-18 RX ADMIN — HALOPERIDOL LACTATE 5 MG: 5 INJECTION INTRAMUSCULAR at 00:58

## 2021-08-18 RX ADMIN — HALOPERIDOL 5 MG: 5 INJECTION INTRAMUSCULAR at 00:58

## 2021-08-25 ENCOUNTER — HOSPITAL ENCOUNTER (EMERGENCY)
Age: 41
Discharge: HOME | End: 2021-08-25
Attending: EMERGENCY MEDICINE

## 2021-08-25 VITALS
HEART RATE: 101 BPM | SYSTOLIC BLOOD PRESSURE: 136 MMHG | OXYGEN SATURATION: 97 % | RESPIRATION RATE: 18 BRPM | DIASTOLIC BLOOD PRESSURE: 78 MMHG | TEMPERATURE: 99.8 F

## 2021-08-25 DIAGNOSIS — F10.920 ALCOHOLIC INTOXICATION WITHOUT COMPLICATION (CMD): Primary | ICD-10-CM

## 2021-08-25 LAB
AMPHETAMINES UR QL SCN>500 NG/ML: NEGATIVE
ANION GAP SERPL CALC-SCNC: 15 MMOL/L (ref 10–20)
BARBITURATES UR QL SCN>200 NG/ML: NEGATIVE
BASOPHILS # BLD: 0.1 K/MCL (ref 0–0.3)
BASOPHILS NFR BLD: 1 %
BENZODIAZ UR QL SCN>200 NG/ML: NEGATIVE
BUN SERPL-MCNC: 12 MG/DL (ref 6–20)
BUN/CREAT SERPL: 32 (ref 7–25)
BZE UR QL SCN>150 NG/ML: NEGATIVE
CALCIUM SERPL-MCNC: 7.8 MG/DL (ref 8.4–10.2)
CANNABINOIDS UR QL SCN>50 NG/ML: NEGATIVE
CHLORIDE SERPL-SCNC: 101 MMOL/L (ref 98–107)
CO2 SERPL-SCNC: 26 MMOL/L (ref 21–32)
CREAT SERPL-MCNC: 0.37 MG/DL (ref 0.51–0.95)
DEPRECATED RDW RBC: 45.4 FL (ref 39–50)
EOSINOPHIL # BLD: 0 K/MCL (ref 0–0.5)
EOSINOPHIL NFR BLD: 0 %
ERYTHROCYTE [DISTWIDTH] IN BLOOD: 14.1 % (ref 11–15)
ETHANOL SERPL-MCNC: 352 MG/DL
FASTING DURATION TIME PATIENT: ABNORMAL H
GFR SERPLBLD BASED ON 1.73 SQ M-ARVRAT: >90 ML/MIN
GLUCOSE SERPL-MCNC: 108 MG/DL (ref 65–99)
HCT VFR BLD CALC: 42.8 % (ref 36–46.5)
HGB BLD-MCNC: 14.3 G/DL (ref 12–15.5)
IMM GRANULOCYTES # BLD AUTO: 0 K/MCL (ref 0–0.2)
IMM GRANULOCYTES # BLD: 0 %
LYMPHOCYTES # BLD: 2.8 K/MCL (ref 1–4.8)
LYMPHOCYTES NFR BLD: 39 %
MCH RBC QN AUTO: 29.7 PG (ref 26–34)
MCHC RBC AUTO-ENTMCNC: 33.4 G/DL (ref 32–36.5)
MCV RBC AUTO: 88.8 FL (ref 78–100)
MONOCYTES # BLD: 0.3 K/MCL (ref 0.3–0.9)
MONOCYTES NFR BLD: 5 %
NEUTROPHILS # BLD: 3.9 K/MCL (ref 1.8–7.7)
NEUTROPHILS NFR BLD: 55 %
NRBC BLD MANUAL-RTO: 0 /100 WBC
OPIATES UR QL SCN>300 NG/ML: NEGATIVE
PCP UR QL SCN>25 NG/ML: NEGATIVE
PLATELET # BLD AUTO: 226 K/MCL (ref 140–450)
POTASSIUM SERPL-SCNC: 3.7 MMOL/L (ref 3.4–5.1)
RBC # BLD: 4.82 MIL/MCL (ref 4–5.2)
SODIUM SERPL-SCNC: 138 MMOL/L (ref 135–145)
WBC # BLD: 7.2 K/MCL (ref 4.2–11)

## 2021-08-25 PROCEDURE — 82077 ASSAY SPEC XCP UR&BREATH IA: CPT | Performed by: EMERGENCY MEDICINE

## 2021-08-25 PROCEDURE — 85025 COMPLETE CBC W/AUTO DIFF WBC: CPT | Performed by: EMERGENCY MEDICINE

## 2021-08-25 PROCEDURE — 80048 BASIC METABOLIC PNL TOTAL CA: CPT | Performed by: EMERGENCY MEDICINE

## 2021-08-25 PROCEDURE — 36415 COLL VENOUS BLD VENIPUNCTURE: CPT

## 2021-08-25 PROCEDURE — 80307 DRUG TEST PRSMV CHEM ANLYZR: CPT | Performed by: EMERGENCY MEDICINE

## 2021-08-25 PROCEDURE — 99283 EMERGENCY DEPT VISIT LOW MDM: CPT | Performed by: EMERGENCY MEDICINE

## 2021-08-25 PROCEDURE — 99284 EMERGENCY DEPT VISIT MOD MDM: CPT

## 2021-08-25 ASSESSMENT — ENCOUNTER SYMPTOMS
CONFUSION: 0
EYE PAIN: 0
NAUSEA: 0
EYE REDNESS: 0
SINUS PAIN: 0
HEADACHES: 0
COUGH: 0
VOMITING: 0
SHORTNESS OF BREATH: 0
ABDOMINAL PAIN: 0
CONSTITUTIONAL NEGATIVE: 1
DIZZINESS: 0

## 2021-08-25 ASSESSMENT — PAIN SCALES - GENERAL: PAINLEVEL_OUTOF10: 0

## 2021-08-29 ENCOUNTER — HOSPITAL ENCOUNTER (OUTPATIENT)
Age: 41
Setting detail: OBSERVATION
LOS: 2 days | Discharge: LEFT BEFORE DISCHARGE | End: 2021-08-31
Attending: EMERGENCY MEDICINE | Admitting: FAMILY MEDICINE

## 2021-08-29 DIAGNOSIS — F10.10 ALCOHOL ABUSE: ICD-10-CM

## 2021-08-29 DIAGNOSIS — Z91.410 HISTORY OF ADULT DOMESTIC PHYSICAL ABUSE: ICD-10-CM

## 2021-08-29 DIAGNOSIS — R56.9 SEIZURE (CMD): ICD-10-CM

## 2021-08-29 DIAGNOSIS — F10.920 ALCOHOLIC INTOXICATION WITHOUT COMPLICATION (CMD): Primary | ICD-10-CM

## 2021-08-29 LAB
ALBUMIN SERPL-MCNC: 4.3 G/DL (ref 3.6–5.1)
ALBUMIN/GLOB SERPL: 1 {RATIO} (ref 1–2.4)
ALP SERPL-CCNC: 96 UNITS/L (ref 45–117)
ALT SERPL-CCNC: 72 UNITS/L
ANION GAP SERPL CALC-SCNC: 14 MMOL/L (ref 10–20)
APAP SERPL-MCNC: <2 MCG/ML (ref 10–30)
AST SERPL-CCNC: 108 UNITS/L
BASOPHILS # BLD: 0.1 K/MCL (ref 0–0.3)
BASOPHILS NFR BLD: 1 %
BILIRUB SERPL-MCNC: 0.7 MG/DL (ref 0.2–1)
BUN SERPL-MCNC: 7 MG/DL (ref 6–20)
BUN/CREAT SERPL: 12 (ref 7–25)
CALCIUM SERPL-MCNC: 8.8 MG/DL (ref 8.4–10.2)
CHLORIDE SERPL-SCNC: 99 MMOL/L (ref 98–107)
CO2 SERPL-SCNC: 25 MMOL/L (ref 21–32)
CREAT SERPL-MCNC: 0.58 MG/DL (ref 0.51–0.95)
DEPRECATED RDW RBC: 43 FL (ref 39–50)
EOSINOPHIL # BLD: 0 K/MCL (ref 0–0.5)
EOSINOPHIL NFR BLD: 0 %
ERYTHROCYTE [DISTWIDTH] IN BLOOD: 13.8 % (ref 11–15)
ETHANOL SERPL-MCNC: 374 MG/DL
FASTING DURATION TIME PATIENT: ABNORMAL H
GFR SERPLBLD BASED ON 1.73 SQ M-ARVRAT: >90 ML/MIN
GLOBULIN SER-MCNC: 4.1 G/DL (ref 2–4)
GLUCOSE SERPL-MCNC: 109 MG/DL (ref 65–99)
HCT VFR BLD CALC: 38.6 % (ref 36–46.5)
HGB BLD-MCNC: 13.6 G/DL (ref 12–15.5)
IMM GRANULOCYTES # BLD AUTO: 0 K/MCL (ref 0–0.2)
IMM GRANULOCYTES # BLD: 0 %
LYMPHOCYTES # BLD: 1.7 K/MCL (ref 1–4.8)
LYMPHOCYTES NFR BLD: 16 %
MAGNESIUM SERPL-MCNC: 2 MG/DL (ref 1.7–2.4)
MCH RBC QN AUTO: 30.1 PG (ref 26–34)
MCHC RBC AUTO-ENTMCNC: 35.2 G/DL (ref 32–36.5)
MCV RBC AUTO: 85.4 FL (ref 78–100)
MONOCYTES # BLD: 0.6 K/MCL (ref 0.3–0.9)
MONOCYTES NFR BLD: 6 %
NEUTROPHILS # BLD: 7.9 K/MCL (ref 1.8–7.7)
NEUTROPHILS NFR BLD: 77 %
NRBC BLD MANUAL-RTO: 0 /100 WBC
PLATELET # BLD AUTO: 174 K/MCL (ref 140–450)
POTASSIUM SERPL-SCNC: 3.4 MMOL/L (ref 3.4–5.1)
PROT SERPL-MCNC: 8.4 G/DL (ref 6.4–8.2)
RBC # BLD: 4.52 MIL/MCL (ref 4–5.2)
SALICYLATES SERPL-MCNC: <2.8 MG/DL
SARS-COV-2 RNA RESP QL NAA+PROBE: NOT DETECTED
SERVICE CMNT-IMP: NORMAL
SERVICE CMNT-IMP: NORMAL
SODIUM SERPL-SCNC: 135 MMOL/L (ref 135–145)
TROPONIN I SERPL HS-MCNC: <0.02 NG/ML
WBC # BLD: 10.3 K/MCL (ref 4.2–11)

## 2021-08-29 PROCEDURE — 85025 COMPLETE CBC W/AUTO DIFF WBC: CPT | Performed by: EMERGENCY MEDICINE

## 2021-08-29 PROCEDURE — 84484 ASSAY OF TROPONIN QUANT: CPT | Performed by: EMERGENCY MEDICINE

## 2021-08-29 PROCEDURE — 83735 ASSAY OF MAGNESIUM: CPT | Performed by: EMERGENCY MEDICINE

## 2021-08-29 PROCEDURE — 96374 THER/PROPH/DIAG INJ IV PUSH: CPT

## 2021-08-29 PROCEDURE — 80177 DRUG SCRN QUAN LEVETIRACETAM: CPT | Performed by: EMERGENCY MEDICINE

## 2021-08-29 PROCEDURE — 93005 ELECTROCARDIOGRAM TRACING: CPT | Performed by: EMERGENCY MEDICINE

## 2021-08-29 PROCEDURE — 99285 EMERGENCY DEPT VISIT HI MDM: CPT | Performed by: STUDENT IN AN ORGANIZED HEALTH CARE EDUCATION/TRAINING PROGRAM

## 2021-08-29 PROCEDURE — 10002807 HB RX 258: Performed by: EMERGENCY MEDICINE

## 2021-08-29 PROCEDURE — 10002803 HB RX 637: Performed by: EMERGENCY MEDICINE

## 2021-08-29 PROCEDURE — 80179 DRUG ASSAY SALICYLATE: CPT | Performed by: EMERGENCY MEDICINE

## 2021-08-29 PROCEDURE — 82077 ASSAY SPEC XCP UR&BREATH IA: CPT | Performed by: EMERGENCY MEDICINE

## 2021-08-29 PROCEDURE — 80053 COMPREHEN METABOLIC PANEL: CPT | Performed by: EMERGENCY MEDICINE

## 2021-08-29 PROCEDURE — 36415 COLL VENOUS BLD VENIPUNCTURE: CPT

## 2021-08-29 PROCEDURE — 80143 DRUG ASSAY ACETAMINOPHEN: CPT | Performed by: EMERGENCY MEDICINE

## 2021-08-29 PROCEDURE — C9803 HOPD COVID-19 SPEC COLLECT: HCPCS

## 2021-08-29 PROCEDURE — 96375 TX/PRO/DX INJ NEW DRUG ADDON: CPT

## 2021-08-29 PROCEDURE — 10002800 HB RX 250 W HCPCS: Performed by: EMERGENCY MEDICINE

## 2021-08-29 PROCEDURE — 10006031 HB ROOM CHARGE TELEMETRY

## 2021-08-29 PROCEDURE — U0003 INFECTIOUS AGENT DETECTION BY NUCLEIC ACID (DNA OR RNA); SEVERE ACUTE RESPIRATORY SYNDROME CORONAVIRUS 2 (SARS-COV-2) (CORONAVIRUS DISEASE [COVID-19]), AMPLIFIED PROBE TECHNIQUE, MAKING USE OF HIGH THROUGHPUT TECHNOLOGIES AS DESCRIBED BY CMS-2020-01-R: HCPCS | Performed by: EMERGENCY MEDICINE

## 2021-08-29 PROCEDURE — 99284 EMERGENCY DEPT VISIT MOD MDM: CPT

## 2021-08-29 RX ORDER — LORAZEPAM 2 MG/1
2 TABLET ORAL
Status: DISCONTINUED | OUTPATIENT
Start: 2021-08-29 | End: 2021-08-30 | Stop reason: SDUPTHER

## 2021-08-29 RX ORDER — FOLIC ACID 1 MG/1
1 TABLET ORAL DAILY
Status: DISCONTINUED | OUTPATIENT
Start: 2021-08-30 | End: 2021-08-31 | Stop reason: HOSPADM

## 2021-08-29 RX ORDER — ONDANSETRON 4 MG/1
4 TABLET, ORALLY DISINTEGRATING ORAL EVERY 12 HOURS PRN
Status: DISCONTINUED | OUTPATIENT
Start: 2021-08-29 | End: 2021-08-31 | Stop reason: HOSPADM

## 2021-08-29 RX ORDER — ONDANSETRON 2 MG/ML
4 INJECTION INTRAMUSCULAR; INTRAVENOUS EVERY 12 HOURS PRN
Status: DISCONTINUED | OUTPATIENT
Start: 2021-08-29 | End: 2021-08-31 | Stop reason: HOSPADM

## 2021-08-29 RX ORDER — LEVETIRACETAM 500 MG/1
500 TABLET ORAL ONCE
Status: COMPLETED | OUTPATIENT
Start: 2021-08-29 | End: 2021-08-29

## 2021-08-29 RX ADMIN — PHENOBARBITAL SODIUM 338 MG: 130 INJECTION INTRAMUSCULAR; INTRAVENOUS at 23:28

## 2021-08-29 RX ADMIN — LEVETIRACETAM 500 MG: 500 TABLET ORAL at 22:32

## 2021-08-29 RX ADMIN — LORAZEPAM 2 MG: 2 TABLET ORAL at 21:38

## 2021-08-29 ASSESSMENT — LIFESTYLE VARIABLES
TREMOR: NOT VISIBLE, BUT CAN BE FELT FINGERTIP TO FINGERTIP
NAUSEA AND VOMITING: NO NAUSEA AND NO VOMITING
TREMOR: NO TREMOR
AGITATION: NORMAL ACTIVITY
VISUAL DISTURBANCES: NOT PRESENT
AUDITORY DISTURBANCES: NOT PRESENT
HEADACHE, FULLNESS IN HEAD: NOT PRESENT
AUDITORY DISTURBANCES: NOT PRESENT
PAROXYSMAL SWEATS: NO SWEAT VISIBLE
NAUSEA AND VOMITING: NO NAUSEA AND NO VOMITING
ANXIETY: MILDLY ANXIOUS
PAROXYSMAL SWEATS: DRENCHING SWEATS
AGITATION: NORMAL ACTIVITY
ANXIETY: 2
AUDITORY DISTURBANCES: NOT PRESENT
HEADACHE, FULLNESS IN HEAD: NOT PRESENT
TREMOR: NO TREMOR
VISUAL DISTURBANCES: NOT PRESENT
AGITATION: MODERATELY FIDGETY AND RESTLESS
VISUAL DISTURBANCES: NOT PRESENT
ANXIETY: NO ANXIETY, AT EASE
NAUSEA AND VOMITING: NO NAUSEA AND NO VOMITING
HEADACHE, FULLNESS IN HEAD: NOT PRESENT
PAROXYSMAL SWEATS: 2

## 2021-08-29 ASSESSMENT — ENCOUNTER SYMPTOMS
APNEA: 0
FEVER: 0
BACK PAIN: 0
VOMITING: 0
SHORTNESS OF BREATH: 0
ABDOMINAL PAIN: 0
DIARRHEA: 0
HEADACHES: 0
CHILLS: 0
NAUSEA: 0
STRIDOR: 0
FACIAL ASYMMETRY: 0

## 2021-08-29 ASSESSMENT — PAIN SCALES - GENERAL: PAINLEVEL_OUTOF10: 0

## 2021-08-30 LAB
LEVETIRACETAM SERPL-MCNC: <2 MCG/ML (ref 6–46)
RAINBOW EXTRA TUBES HOLD SPECIMEN: NORMAL

## 2021-08-30 PROCEDURE — 10004651 HB RX, NO CHARGE ITEM: Performed by: FAMILY MEDICINE

## 2021-08-30 PROCEDURE — 10002800 HB RX 250 W HCPCS: Performed by: EMERGENCY MEDICINE

## 2021-08-30 PROCEDURE — 96375 TX/PRO/DX INJ NEW DRUG ADDON: CPT

## 2021-08-30 PROCEDURE — 10002800 HB RX 250 W HCPCS: Performed by: INTERNAL MEDICINE

## 2021-08-30 PROCEDURE — 99233 SBSQ HOSP IP/OBS HIGH 50: CPT | Performed by: INTERNAL MEDICINE

## 2021-08-30 PROCEDURE — 10002803 HB RX 637: Performed by: INTERNAL MEDICINE

## 2021-08-30 PROCEDURE — 10002803 HB RX 637: Performed by: EMERGENCY MEDICINE

## 2021-08-30 PROCEDURE — 10006031 HB ROOM CHARGE TELEMETRY

## 2021-08-30 RX ORDER — ONDANSETRON 2 MG/ML
4 INJECTION INTRAMUSCULAR; INTRAVENOUS EVERY 4 HOURS PRN
Status: DISCONTINUED | OUTPATIENT
Start: 2021-08-30 | End: 2021-08-30

## 2021-08-30 RX ORDER — LORAZEPAM 2 MG/ML
2 INJECTION INTRAMUSCULAR
Status: DISCONTINUED | OUTPATIENT
Start: 2021-08-30 | End: 2021-08-31 | Stop reason: HOSPADM

## 2021-08-30 RX ORDER — LORAZEPAM 2 MG/ML
4 INJECTION INTRAMUSCULAR
Status: DISCONTINUED | OUTPATIENT
Start: 2021-08-30 | End: 2021-08-31 | Stop reason: HOSPADM

## 2021-08-30 RX ORDER — SERTRALINE HYDROCHLORIDE 100 MG/1
100 TABLET, FILM COATED ORAL DAILY
Status: ON HOLD | COMMUNITY
End: 2021-12-17 | Stop reason: HOSPADM

## 2021-08-30 RX ORDER — LORAZEPAM 2 MG/ML
3 INJECTION INTRAMUSCULAR
Status: DISCONTINUED | OUTPATIENT
Start: 2021-08-30 | End: 2021-08-31 | Stop reason: HOSPADM

## 2021-08-30 RX ORDER — TRAZODONE HYDROCHLORIDE 150 MG/1
150 TABLET ORAL NIGHTLY
Status: ON HOLD | COMMUNITY
End: 2021-12-17 | Stop reason: HOSPADM

## 2021-08-30 RX ORDER — SERTRALINE HYDROCHLORIDE 100 MG/1
100 TABLET, FILM COATED ORAL DAILY
Status: DISCONTINUED | OUTPATIENT
Start: 2021-08-31 | End: 2021-08-31 | Stop reason: HOSPADM

## 2021-08-30 RX ORDER — ACETAMINOPHEN 325 MG/1
650 TABLET ORAL EVERY 4 HOURS PRN
Status: DISCONTINUED | OUTPATIENT
Start: 2021-08-30 | End: 2021-08-31 | Stop reason: HOSPADM

## 2021-08-30 RX ORDER — TRAZODONE HYDROCHLORIDE 50 MG/1
150 TABLET ORAL NIGHTLY
Status: DISCONTINUED | OUTPATIENT
Start: 2021-08-30 | End: 2021-08-31 | Stop reason: HOSPADM

## 2021-08-30 RX ORDER — LEVETIRACETAM 500 MG/1
500 TABLET ORAL 2 TIMES DAILY
Status: DISCONTINUED | OUTPATIENT
Start: 2021-08-30 | End: 2021-08-31 | Stop reason: HOSPADM

## 2021-08-30 RX ORDER — LEVETIRACETAM 500 MG/1
500 TABLET ORAL 2 TIMES DAILY
Status: ON HOLD | COMMUNITY
End: 2021-12-18 | Stop reason: SDUPTHER

## 2021-08-30 RX ADMIN — LORAZEPAM 2 MG: 2 TABLET ORAL at 16:50

## 2021-08-30 RX ADMIN — Medication 100 MG: at 05:38

## 2021-08-30 RX ADMIN — FOLIC ACID 1 MG: 1 TABLET ORAL at 05:38

## 2021-08-30 RX ADMIN — LORAZEPAM 2 MG: 2 INJECTION INTRAMUSCULAR; INTRAVENOUS at 21:04

## 2021-08-30 RX ADMIN — TRAZODONE HYDROCHLORIDE 150 MG: 50 TABLET ORAL at 21:04

## 2021-08-30 RX ADMIN — ONDANSETRON 4 MG: 2 INJECTION INTRAMUSCULAR; INTRAVENOUS at 09:00

## 2021-08-30 RX ADMIN — LORAZEPAM 2 MG: 2 TABLET ORAL at 05:38

## 2021-08-30 RX ADMIN — ACETAMINOPHEN 650 MG: 325 TABLET ORAL at 18:59

## 2021-08-30 RX ADMIN — LEVETIRACETAM 500 MG: 500 TABLET, FILM COATED ORAL at 23:33

## 2021-08-30 ASSESSMENT — LIFESTYLE VARIABLES
ANXIETY: NO ANXIETY, AT EASE
AGITATION: NORMAL ACTIVITY
PAROXYSMAL SWEATS: NO SWEAT VISIBLE
PAROXYSMAL SWEATS: NO SWEAT VISIBLE
AGITATION: NORMAL ACTIVITY
VISUAL DISTURBANCES: NOT PRESENT
NAUSEA AND VOMITING: NO NAUSEA AND NO VOMITING
HEADACHE, FULLNESS IN HEAD: NOT PRESENT
HEADACHE, FULLNESS IN HEAD: NOT PRESENT
PAROXYSMAL SWEATS: NO SWEAT VISIBLE
AUDITORY DISTURBANCES: NOT PRESENT
NAUSEA AND VOMITING: NO NAUSEA AND NO VOMITING
VISUAL DISTURBANCES: NOT PRESENT
HOW MANY STANDARD DRINKS CONTAINING ALCOHOL DO YOU HAVE ON A TYPICAL DAY: 0,1 OR 2
PAROXYSMAL SWEATS: NO SWEAT VISIBLE
PAROXYSMAL SWEATS: NO SWEAT VISIBLE
ANXIETY: NO ANXIETY, AT EASE
HEADACHE, FULLNESS IN HEAD: NOT PRESENT
NAUSEA AND VOMITING: 2
PAROXYSMAL SWEATS: NO SWEAT VISIBLE
AGITATION: NORMAL ACTIVITY
VISUAL DISTURBANCES: NOT PRESENT
AUDITORY DISTURBANCES: NOT PRESENT
TREMOR: NO TREMOR
VISUAL DISTURBANCES: NOT PRESENT
PAROXYSMAL SWEATS: NO SWEAT VISIBLE
AUDITORY DISTURBANCES: NOT PRESENT
ANXIETY: MODERATELY ANXIOUS, OR GUARDED, SO ANXIETY IS INFERRED
VISUAL DISTURBANCES: NOT PRESENT
NAUSEA AND VOMITING: NO NAUSEA AND NO VOMITING
AGITATION: NORMAL ACTIVITY
AGITATION: NORMAL ACTIVITY
ANXIETY: NO ANXIETY, AT EASE
NAUSEA AND VOMITING: NO NAUSEA AND NO VOMITING
AUDITORY DISTURBANCES: NOT PRESENT
HEADACHE, FULLNESS IN HEAD: NOT PRESENT
VISUAL DISTURBANCES: NOT PRESENT
TREMOR: NO TREMOR
AGITATION: NORMAL ACTIVITY
ANXIETY: NO ANXIETY, AT EASE
NAUSEA AND VOMITING: NO NAUSEA AND NO VOMITING
TREMOR: NO TREMOR
NAUSEA AND VOMITING: NO NAUSEA AND NO VOMITING
NAUSEA AND VOMITING: NO NAUSEA AND NO VOMITING
HEADACHE, FULLNESS IN HEAD: NOT PRESENT
NAUSEA AND VOMITING: NO NAUSEA AND NO VOMITING
AGITATION: NORMAL ACTIVITY
ANXIETY: NO ANXIETY, AT EASE
TREMOR: NO TREMOR
NAUSEA AND VOMITING: NO NAUSEA AND NO VOMITING
ANXIETY: NO ANXIETY, AT EASE
TREMOR: NO TREMOR
ANXIETY: NO ANXIETY, AT EASE
PRIOR ALCOHOL TREATMENT: NO
TREMOR: NO TREMOR
HEADACHE, FULLNESS IN HEAD: NOT PRESENT
AUDITORY DISTURBANCES: NOT PRESENT
ANXIETY: NO ANXIETY, AT EASE
VISUAL DISTURBANCES: NOT PRESENT
AGITATION: NORMAL ACTIVITY
TREMOR: NO TREMOR
PAROXYSMAL SWEATS: NO SWEAT VISIBLE
TREMOR: NO TREMOR
AGITATION: NORMAL ACTIVITY
NAUSEA AND VOMITING: NO NAUSEA AND NO VOMITING
AGITATION: SOMEWHAT MORE THAN NORMAL ACTIVITY
HEADACHE, FULLNESS IN HEAD: NOT PRESENT
VISUAL DISTURBANCES: NOT PRESENT
HOW OFTEN DO YOU HAVE A DRINK CONTAINING ALCOHOL: 4 OR MORE TIMES PER WEEK
ANXIETY: NO ANXIETY, AT EASE
ANXIETY: NO ANXIETY, AT EASE
ANXIETY: MILDLY ANXIOUS
TREMOR: NO TREMOR
NAUSEA AND VOMITING: NO NAUSEA AND NO VOMITING
NAUSEA AND VOMITING: NO NAUSEA AND NO VOMITING
AGITATION: NORMAL ACTIVITY
TREMOR: NO TREMOR
AUDITORY DISTURBANCES: NOT PRESENT
VISUAL DISTURBANCES: NOT PRESENT
VISUAL DISTURBANCES: NOT PRESENT
AGITATION: NORMAL ACTIVITY
PAROXYSMAL SWEATS: BEADS OF SWEAT OBVIOUS ON FOREHEAD
TREMOR: NO TREMOR
PAROXYSMAL SWEATS: NO SWEAT VISIBLE
NAUSEA AND VOMITING: NO NAUSEA AND NO VOMITING
AGITATION: NORMAL ACTIVITY
HEADACHE, FULLNESS IN HEAD: NOT PRESENT
ANXIETY: NO ANXIETY, AT EASE
AUDITORY DISTURBANCES: NOT PRESENT
AUDITORY DISTURBANCES: NOT PRESENT
TREMOR: NO TREMOR
VISUAL DISTURBANCES: NOT PRESENT
AUDIT-C TOTAL SCORE: 8
TREMOR: NO TREMOR
HEADACHE, FULLNESS IN HEAD: NOT PRESENT
AGITATION: MODERATELY FIDGETY AND RESTLESS
NAUSEA AND VOMITING: NO NAUSEA AND NO VOMITING
ANXIETY: NO ANXIETY, AT EASE
AGITATION: NORMAL ACTIVITY
PAROXYSMAL SWEATS: NO SWEAT VISIBLE
PAROXYSMAL SWEATS: NO SWEAT VISIBLE
HEADACHE, FULLNESS IN HEAD: NOT PRESENT
TREMOR: NO TREMOR
HEADACHE, FULLNESS IN HEAD: NOT PRESENT
AUDITORY DISTURBANCES: NOT PRESENT
AUDITORY DISTURBANCES: NOT PRESENT
NAUSEA AND VOMITING: NO NAUSEA AND NO VOMITING
VISUAL DISTURBANCES: NOT PRESENT
LAST DRINK YOU HAD: 48 HOURS OR LESS
PAROXYSMAL SWEATS: NO SWEAT VISIBLE
VISUAL DISTURBANCES: NOT PRESENT
HEADACHE, FULLNESS IN HEAD: NOT PRESENT
VISUAL DISTURBANCES: NOT PRESENT
AUDITORY DISTURBANCES: NOT PRESENT
HEADACHE, FULLNESS IN HEAD: VERY MILD
VISUAL DISTURBANCES: NOT PRESENT
AUDITORY DISTURBANCES: NOT PRESENT
NAUSEA AND VOMITING: NO NAUSEA AND NO VOMITING
TREMOR: NO TREMOR
AGITATION: NORMAL ACTIVITY
ANXIETY: NO ANXIETY, AT EASE
HOW OFTEN DO YOU HAVE 6 OR MORE DRINKS ON ONE OCCASION: DAILY OR ALMOST DAILY
AUDITORY DISTURBANCES: NOT PRESENT
PAROXYSMAL SWEATS: NO SWEAT VISIBLE
HEADACHE, FULLNESS IN HEAD: NOT PRESENT
PAROXYSMAL SWEATS: NO SWEAT VISIBLE
ANXIETY: NO ANXIETY, AT EASE
VISUAL DISTURBANCES: NOT PRESENT
TREMOR: NOT VISIBLE, BUT CAN BE FELT FINGERTIP TO FINGERTIP
HEADACHE, FULLNESS IN HEAD: NOT PRESENT
PAROXYSMAL SWEATS: NO SWEAT VISIBLE
VISUAL DISTURBANCES: NOT PRESENT
AUDITORY DISTURBANCES: NOT PRESENT
AUDITORY DISTURBANCES: NOT PRESENT
ALCOHOL_USE_STATUS: UNHEALTHY DRINKING IDENTIFIED. AUDIT C: 3 OR MORE FOR WOMEN AND 4 OR MORE FOR MEN.
PAROXYSMAL SWEATS: 2
HEADACHE, FULLNESS IN HEAD: NOT PRESENT
AGITATION: NORMAL ACTIVITY
VISUAL DISTURBANCES: NOT PRESENT
PAROXYSMAL SWEATS: NO SWEAT VISIBLE
NAUSEA AND VOMITING: NO NAUSEA AND NO VOMITING
AUDITORY DISTURBANCES: NOT PRESENT
AGITATION: NORMAL ACTIVITY
ANXIETY: NO ANXIETY, AT EASE
TREMOR: NO TREMOR
HEADACHE, FULLNESS IN HEAD: NOT PRESENT
ANXIETY: NO ANXIETY, AT EASE
HISTORY OF PROBLEMS WHEN YOU STOP DRINKING ALCOHOL: NO
HEADACHE, FULLNESS IN HEAD: NOT PRESENT
TREMOR: NO TREMOR

## 2021-08-30 ASSESSMENT — ACTIVITIES OF DAILY LIVING (ADL)
ADL_SHORT_OF_BREATH: NO
RECENT_DECLINE_ADL: NO
ADL_BEFORE_ADMISSION: INDEPENDENT
ADL_SCORE: 12
CHRONIC_PAIN_PRESENT: NO

## 2021-08-30 ASSESSMENT — PATIENT HEALTH QUESTIONNAIRE - PHQ9
SUM OF ALL RESPONSES TO PHQ9 QUESTIONS 1 AND 2: 0
IS PATIENT ABLE TO COMPLETE PHQ2 OR PHQ9: YES
CLINICAL INTERPRETATION OF PHQ2 SCORE: NO FURTHER SCREENING NEEDED
SUM OF ALL RESPONSES TO PHQ9 QUESTIONS 1 AND 2: 0
1. LITTLE INTEREST OR PLEASURE IN DOING THINGS: NOT AT ALL
2. FEELING DOWN, DEPRESSED OR HOPELESS: NOT AT ALL
CLINICAL INTERPRETATION OF PHQ9 SCORE: NO FURTHER SCREENING NEEDED

## 2021-08-30 ASSESSMENT — COGNITIVE AND FUNCTIONAL STATUS - GENERAL
BECAUSE OF A PHYSICAL, MENTAL, OR EMOTIONAL CONDITION, DO YOU HAVE DIFFICULTY DOING ERRANDS ALONE: NO
ARE YOU DEAF OR DO YOU HAVE SERIOUS DIFFICULTY  HEARING: NO
DO YOU HAVE DIFFICULTY DRESSING OR BATHING: NO
ARE YOU BLIND OR DO YOU HAVE SERIOUS DIFFICULTY SEEING, EVEN WHEN WEARING GLASSES: NO
DO YOU HAVE SERIOUS DIFFICULTY WALKING OR CLIMBING STAIRS: NO
BECAUSE OF A PHYSICAL, MENTAL, OR EMOTIONAL CONDITION, DO YOU HAVE SERIOUS DIFFICULTY CONCENTRATING, REMEMBERING OR MAKING DECISIONS: YES

## 2021-08-30 ASSESSMENT — COLUMBIA-SUICIDE SEVERITY RATING SCALE - C-SSRS
2. HAVE YOU ACTUALLY HAD ANY THOUGHTS OF KILLING YOURSELF?: NO
IS THE PATIENT ABLE TO COMPLETE C-SSRS: YES
1. WITHIN THE PAST MONTH, HAVE YOU WISHED YOU WERE DEAD OR WISHED YOU COULD GO TO SLEEP AND NOT WAKE UP?: NO
6. HAVE YOU EVER DONE ANYTHING, STARTED TO DO ANYTHING, OR PREPARED TO DO ANYTHING TO END YOUR LIFE?: NO

## 2021-08-30 ASSESSMENT — PAIN SCALES - GENERAL
PAINLEVEL_OUTOF10: 2
PAINLEVEL_OUTOF10: 3
PAINLEVEL_OUTOF10: 0

## 2021-08-31 VITALS
OXYGEN SATURATION: 97 % | TEMPERATURE: 98.4 F | RESPIRATION RATE: 16 BRPM | HEART RATE: 89 BPM | BODY MASS INDEX: 26.6 KG/M2 | WEIGHT: 150.13 LBS | DIASTOLIC BLOOD PRESSURE: 90 MMHG | SYSTOLIC BLOOD PRESSURE: 133 MMHG | HEIGHT: 63 IN

## 2021-08-31 LAB
ANION GAP SERPL CALC-SCNC: 11 MMOL/L (ref 10–20)
BUN SERPL-MCNC: 6 MG/DL (ref 6–20)
BUN/CREAT SERPL: 10 (ref 7–25)
CALCIUM SERPL-MCNC: 9.3 MG/DL (ref 8.4–10.2)
CHLORIDE SERPL-SCNC: 101 MMOL/L (ref 98–107)
CO2 SERPL-SCNC: 28 MMOL/L (ref 21–32)
CREAT SERPL-MCNC: 0.62 MG/DL (ref 0.51–0.95)
FASTING DURATION TIME PATIENT: NORMAL H
GFR SERPLBLD BASED ON 1.73 SQ M-ARVRAT: >90 ML/MIN
GLUCOSE SERPL-MCNC: 89 MG/DL (ref 65–99)
MAGNESIUM SERPL-MCNC: 2.1 MG/DL (ref 1.7–2.4)
POTASSIUM SERPL-SCNC: 3.5 MMOL/L (ref 3.4–5.1)
SODIUM SERPL-SCNC: 136 MMOL/L (ref 135–145)

## 2021-08-31 PROCEDURE — G0378 HOSPITAL OBSERVATION PER HR: HCPCS

## 2021-08-31 PROCEDURE — 83735 ASSAY OF MAGNESIUM: CPT | Performed by: INTERNAL MEDICINE

## 2021-08-31 PROCEDURE — 99215 OFFICE O/P EST HI 40 MIN: CPT | Performed by: INTERNAL MEDICINE

## 2021-08-31 PROCEDURE — 80048 BASIC METABOLIC PNL TOTAL CA: CPT | Performed by: INTERNAL MEDICINE

## 2021-08-31 PROCEDURE — 10002803 HB RX 637: Performed by: EMERGENCY MEDICINE

## 2021-08-31 PROCEDURE — 36415 COLL VENOUS BLD VENIPUNCTURE: CPT | Performed by: INTERNAL MEDICINE

## 2021-08-31 PROCEDURE — 10002803 HB RX 637: Performed by: INTERNAL MEDICINE

## 2021-08-31 RX ORDER — GABAPENTIN 300 MG/1
300 CAPSULE ORAL 3 TIMES DAILY
Status: DISCONTINUED | OUTPATIENT
Start: 2021-08-31 | End: 2021-08-31 | Stop reason: HOSPADM

## 2021-08-31 RX ORDER — HYDROXYZINE HYDROCHLORIDE 25 MG/1
25 TABLET, FILM COATED ORAL 3 TIMES DAILY
Status: DISCONTINUED | OUTPATIENT
Start: 2021-08-31 | End: 2021-08-31 | Stop reason: HOSPADM

## 2021-08-31 RX ADMIN — FOLIC ACID 1 MG: 1 TABLET ORAL at 08:34

## 2021-08-31 RX ADMIN — LEVETIRACETAM 500 MG: 500 TABLET, FILM COATED ORAL at 08:34

## 2021-08-31 RX ADMIN — Medication 100 MG: at 08:34

## 2021-08-31 RX ADMIN — SERTRALINE HYDROCHLORIDE 100 MG: 100 TABLET, FILM COATED ORAL at 08:34

## 2021-08-31 ASSESSMENT — LIFESTYLE VARIABLES
TREMOR: NO TREMOR
AGITATION: NORMAL ACTIVITY
AGITATION: NORMAL ACTIVITY
AUDITORY DISTURBANCES: NOT PRESENT
HEADACHE, FULLNESS IN HEAD: NOT PRESENT
NAUSEA AND VOMITING: NO NAUSEA AND NO VOMITING
AGITATION: NORMAL ACTIVITY
VISUAL DISTURBANCES: NOT PRESENT
TREMOR: NO TREMOR
AUDITORY DISTURBANCES: NOT PRESENT
TREMOR: NO TREMOR
HEADACHE, FULLNESS IN HEAD: NOT PRESENT
VISUAL DISTURBANCES: NOT PRESENT
AGITATION: NORMAL ACTIVITY
AGITATION: NORMAL ACTIVITY
VISUAL DISTURBANCES: NOT PRESENT
TREMOR: NO TREMOR
HEADACHE, FULLNESS IN HEAD: NOT PRESENT
NAUSEA AND VOMITING: NO NAUSEA AND NO VOMITING
AUDITORY DISTURBANCES: NOT PRESENT
ANXIETY: NO ANXIETY, AT EASE
PAROXYSMAL SWEATS: NO SWEAT VISIBLE
NAUSEA AND VOMITING: NO NAUSEA AND NO VOMITING
AUDITORY DISTURBANCES: NOT PRESENT
AGITATION: NORMAL ACTIVITY
AUDITORY DISTURBANCES: NOT PRESENT
AUDITORY DISTURBANCES: NOT PRESENT
VISUAL DISTURBANCES: VERY MILD SENSITIVITY
NAUSEA AND VOMITING: NO NAUSEA AND NO VOMITING
VISUAL DISTURBANCES: NOT PRESENT
ANXIETY: NO ANXIETY, AT EASE
HEADACHE, FULLNESS IN HEAD: NOT PRESENT
ANXIETY: NO ANXIETY, AT EASE
NAUSEA AND VOMITING: NO NAUSEA AND NO VOMITING
HEADACHE, FULLNESS IN HEAD: NOT PRESENT
ANXIETY: 2
PAROXYSMAL SWEATS: NO SWEAT VISIBLE
PAROXYSMAL SWEATS: NO SWEAT VISIBLE
ANXIETY: NO ANXIETY, AT EASE
TREMOR: NO TREMOR
PAROXYSMAL SWEATS: NO SWEAT VISIBLE
NAUSEA AND VOMITING: NO NAUSEA AND NO VOMITING
PAROXYSMAL SWEATS: NO SWEAT VISIBLE
PAROXYSMAL SWEATS: NO SWEAT VISIBLE
ANXIETY: 3
HEADACHE, FULLNESS IN HEAD: NOT PRESENT
VISUAL DISTURBANCES: NOT PRESENT
TREMOR: NO TREMOR

## 2021-08-31 ASSESSMENT — COGNITIVE AND FUNCTIONAL STATUS - GENERAL
BECAUSE OF A PHYSICAL, MENTAL, OR EMOTIONAL CONDITION, DO YOU HAVE DIFFICULTY DOING ERRANDS ALONE: NO
BECAUSE OF A PHYSICAL, MENTAL, OR EMOTIONAL CONDITION, DO YOU HAVE SERIOUS DIFFICULTY CONCENTRATING, REMEMBERING OR MAKING DECISIONS: NO
DO YOU HAVE DIFFICULTY DRESSING OR BATHING: NO
DO YOU HAVE SERIOUS DIFFICULTY WALKING OR CLIMBING STAIRS: NO

## 2021-08-31 ASSESSMENT — PAIN SCALES - GENERAL: PAINLEVEL_OUTOF10: 0

## 2021-09-01 DIAGNOSIS — Z65.4 VICTIM OF CRIME: Primary | ICD-10-CM

## 2021-09-02 ENCOUNTER — TELEPHONE (OUTPATIENT)
Dept: SCHEDULING | Age: 41
End: 2021-09-02

## 2021-09-02 LAB
ATRIAL RATE (BPM): 103
P AXIS (DEGREES): 66
PR-INTERVAL (MSEC): 146
QRS-INTERVAL (MSEC): 92
QT-INTERVAL (MSEC): 362
QTC: 474
R AXIS (DEGREES): 83
REPORT TEXT: NORMAL
T AXIS (DEGREES): 65
VENTRICULAR RATE EKG/MIN (BPM): 103

## 2021-09-03 ENCOUNTER — HOSPITAL ENCOUNTER (EMERGENCY)
Age: 41
Discharge: HOME | End: 2021-09-04
Attending: EMERGENCY MEDICINE

## 2021-09-03 DIAGNOSIS — F10.920 ALCOHOLIC INTOXICATION WITHOUT COMPLICATION (CMD): Primary | ICD-10-CM

## 2021-09-03 LAB
ANION GAP SERPL CALC-SCNC: 24 MMOL/L (ref 10–20)
BASOPHILS # BLD: 0.1 K/MCL (ref 0–0.3)
BASOPHILS NFR BLD: 1 %
BUN SERPL-MCNC: 11 MG/DL (ref 6–20)
BUN/CREAT SERPL: 23 (ref 7–25)
CALCIUM SERPL-MCNC: 7.4 MG/DL (ref 8.4–10.2)
CHLORIDE SERPL-SCNC: 98 MMOL/L (ref 98–107)
CO2 SERPL-SCNC: 20 MMOL/L (ref 21–32)
CREAT SERPL-MCNC: 0.47 MG/DL (ref 0.51–0.95)
DEPRECATED RDW RBC: 46.4 FL (ref 39–50)
EOSINOPHIL # BLD: 0 K/MCL (ref 0–0.5)
EOSINOPHIL NFR BLD: 0 %
ERYTHROCYTE [DISTWIDTH] IN BLOOD: 14.6 % (ref 11–15)
ETHANOL SERPL-MCNC: 481 MG/DL
FASTING DURATION TIME PATIENT: ABNORMAL H
GFR SERPLBLD BASED ON 1.73 SQ M-ARVRAT: >90 ML/MIN
GLUCOSE SERPL-MCNC: 91 MG/DL (ref 65–99)
HCT VFR BLD CALC: 37.3 % (ref 36–46.5)
HGB BLD-MCNC: 12.7 G/DL (ref 12–15.5)
IMM GRANULOCYTES # BLD AUTO: 0 K/MCL (ref 0–0.2)
IMM GRANULOCYTES # BLD: 0 %
LYMPHOCYTES # BLD: 1.7 K/MCL (ref 1–4.8)
LYMPHOCYTES NFR BLD: 21 %
MCH RBC QN AUTO: 30.1 PG (ref 26–34)
MCHC RBC AUTO-ENTMCNC: 34 G/DL (ref 32–36.5)
MCV RBC AUTO: 88.4 FL (ref 78–100)
MONOCYTES # BLD: 0.4 K/MCL (ref 0.3–0.9)
MONOCYTES NFR BLD: 5 %
NEUTROPHILS # BLD: 5.6 K/MCL (ref 1.8–7.7)
NEUTROPHILS NFR BLD: 73 %
NRBC BLD MANUAL-RTO: 0 /100 WBC
PLATELET # BLD AUTO: 161 K/MCL (ref 140–450)
POTASSIUM SERPL-SCNC: 4 MMOL/L (ref 3.4–5.1)
RBC # BLD: 4.22 MIL/MCL (ref 4–5.2)
SODIUM SERPL-SCNC: 138 MMOL/L (ref 135–145)
WBC # BLD: 7.7 K/MCL (ref 4.2–11)

## 2021-09-03 PROCEDURE — 82077 ASSAY SPEC XCP UR&BREATH IA: CPT | Performed by: EMERGENCY MEDICINE

## 2021-09-03 PROCEDURE — 10002800 HB RX 250 W HCPCS: Performed by: EMERGENCY MEDICINE

## 2021-09-03 PROCEDURE — 87635 SARS-COV-2 COVID-19 AMP PRB: CPT | Performed by: EMERGENCY MEDICINE

## 2021-09-03 PROCEDURE — 96374 THER/PROPH/DIAG INJ IV PUSH: CPT

## 2021-09-03 PROCEDURE — 85025 COMPLETE CBC W/AUTO DIFF WBC: CPT | Performed by: EMERGENCY MEDICINE

## 2021-09-03 PROCEDURE — 93005 ELECTROCARDIOGRAM TRACING: CPT | Performed by: EMERGENCY MEDICINE

## 2021-09-03 PROCEDURE — 81025 URINE PREGNANCY TEST: CPT | Performed by: EMERGENCY MEDICINE

## 2021-09-03 PROCEDURE — 80177 DRUG SCRN QUAN LEVETIRACETAM: CPT | Performed by: EMERGENCY MEDICINE

## 2021-09-03 PROCEDURE — 99284 EMERGENCY DEPT VISIT MOD MDM: CPT

## 2021-09-03 PROCEDURE — 96372 THER/PROPH/DIAG INJ SC/IM: CPT

## 2021-09-03 PROCEDURE — 99285 EMERGENCY DEPT VISIT HI MDM: CPT | Performed by: EMERGENCY MEDICINE

## 2021-09-03 PROCEDURE — 96361 HYDRATE IV INFUSION ADD-ON: CPT

## 2021-09-03 PROCEDURE — 96375 TX/PRO/DX INJ NEW DRUG ADDON: CPT

## 2021-09-03 PROCEDURE — 80048 BASIC METABOLIC PNL TOTAL CA: CPT | Performed by: EMERGENCY MEDICINE

## 2021-09-03 RX ORDER — LEVETIRACETAM 500 MG/1
500 TABLET ORAL EVERY 12 HOURS SCHEDULED
Status: DISCONTINUED | OUTPATIENT
Start: 2021-09-04 | End: 2021-09-04 | Stop reason: HOSPADM

## 2021-09-03 RX ORDER — FOLIC ACID 5 MG/ML
1 INJECTION, SOLUTION INTRAMUSCULAR; INTRAVENOUS; SUBCUTANEOUS ONCE
Status: COMPLETED | OUTPATIENT
Start: 2021-09-03 | End: 2021-09-04

## 2021-09-03 RX ORDER — THIAMINE HYDROCHLORIDE 100 MG/ML
100 INJECTION, SOLUTION INTRAMUSCULAR; INTRAVENOUS ONCE
Status: COMPLETED | OUTPATIENT
Start: 2021-09-03 | End: 2021-09-04

## 2021-09-03 RX ORDER — ZIPRASIDONE MESYLATE 20 MG/ML
10 INJECTION, POWDER, LYOPHILIZED, FOR SOLUTION INTRAMUSCULAR EVERY 4 HOURS PRN
Status: DISCONTINUED | OUTPATIENT
Start: 2021-09-03 | End: 2021-09-04 | Stop reason: HOSPADM

## 2021-09-03 RX ADMIN — ZIPRASIDONE MESYLATE 10 MG: 20 INJECTION, POWDER, LYOPHILIZED, FOR SOLUTION INTRAMUSCULAR at 23:15

## 2021-09-03 ASSESSMENT — PAIN SCALES - GENERAL: PAINLEVEL_OUTOF10: 0

## 2021-09-04 VITALS
SYSTOLIC BLOOD PRESSURE: 127 MMHG | TEMPERATURE: 98.7 F | WEIGHT: 147.27 LBS | OXYGEN SATURATION: 99 % | HEART RATE: 79 BPM | DIASTOLIC BLOOD PRESSURE: 95 MMHG | BODY MASS INDEX: 26.09 KG/M2 | RESPIRATION RATE: 19 BRPM

## 2021-09-04 LAB
AMPHETAMINES UR QL SCN>500 NG/ML: NEGATIVE
ATRIAL RATE (BPM): 115
B-HCG UR QL: NEGATIVE
BARBITURATES UR QL SCN>200 NG/ML: POSITIVE
BENZODIAZ UR QL SCN>200 NG/ML: NEGATIVE
BZE UR QL SCN>150 NG/ML: NEGATIVE
CANNABINOIDS UR QL SCN>50 NG/ML: NEGATIVE
INTERNAL PROCEDURAL CONTROLS ACCEPTABLE: YES
LEVETIRACETAM SERPL-MCNC: <2 MCG/ML (ref 6–46)
OPIATES UR QL SCN>300 NG/ML: NEGATIVE
P AXIS (DEGREES): 78
PCP UR QL SCN>25 NG/ML: NEGATIVE
PR-INTERVAL (MSEC): 152
QRS-INTERVAL (MSEC): 96
QT-INTERVAL (MSEC): 352
QTC: 487
R AXIS (DEGREES): 84
REPORT TEXT: NORMAL
SARS-COV-2 RNA RESP QL NAA+PROBE: NOT DETECTED
SERVICE CMNT-IMP: NORMAL
SERVICE CMNT-IMP: NORMAL
T AXIS (DEGREES): 77
VENTRICULAR RATE EKG/MIN (BPM): 115

## 2021-09-04 PROCEDURE — 10002803 HB RX 637: Performed by: EMERGENCY MEDICINE

## 2021-09-04 PROCEDURE — 96374 THER/PROPH/DIAG INJ IV PUSH: CPT

## 2021-09-04 PROCEDURE — 10002807 HB RX 258: Performed by: EMERGENCY MEDICINE

## 2021-09-04 PROCEDURE — 10002801 HB RX 250 W/O HCPCS: Performed by: EMERGENCY MEDICINE

## 2021-09-04 PROCEDURE — X1094 NO CHARGE VISIT: HCPCS | Performed by: EMERGENCY MEDICINE

## 2021-09-04 PROCEDURE — 10002803 HB RX 637: Performed by: STUDENT IN AN ORGANIZED HEALTH CARE EDUCATION/TRAINING PROGRAM

## 2021-09-04 PROCEDURE — 10002800 HB RX 250 W HCPCS: Performed by: STUDENT IN AN ORGANIZED HEALTH CARE EDUCATION/TRAINING PROGRAM

## 2021-09-04 PROCEDURE — 10002800 HB RX 250 W HCPCS: Performed by: EMERGENCY MEDICINE

## 2021-09-04 PROCEDURE — 96372 THER/PROPH/DIAG INJ SC/IM: CPT

## 2021-09-04 PROCEDURE — 96375 TX/PRO/DX INJ NEW DRUG ADDON: CPT

## 2021-09-04 PROCEDURE — 80307 DRUG TEST PRSMV CHEM ANLYZR: CPT | Performed by: EMERGENCY MEDICINE

## 2021-09-04 RX ORDER — ONDANSETRON 2 MG/ML
4 INJECTION INTRAMUSCULAR; INTRAVENOUS ONCE
Status: DISCONTINUED | OUTPATIENT
Start: 2021-09-04 | End: 2021-09-04 | Stop reason: HOSPADM

## 2021-09-04 RX ORDER — LORAZEPAM 1 MG/1
1 TABLET ORAL ONCE
Status: COMPLETED | OUTPATIENT
Start: 2021-09-04 | End: 2021-09-04

## 2021-09-04 RX ORDER — LORAZEPAM 2 MG/ML
1 INJECTION INTRAMUSCULAR ONCE
Status: COMPLETED | OUTPATIENT
Start: 2021-09-04 | End: 2021-09-04

## 2021-09-04 RX ORDER — ONDANSETRON 4 MG/1
4 TABLET, ORALLY DISINTEGRATING ORAL ONCE
Status: COMPLETED | OUTPATIENT
Start: 2021-09-04 | End: 2021-09-04

## 2021-09-04 RX ADMIN — THIAMINE HYDROCHLORIDE 100 MG: 100 INJECTION, SOLUTION INTRAMUSCULAR; INTRAVENOUS at 01:05

## 2021-09-04 RX ADMIN — FOLIC ACID 1 MG: 5 INJECTION, SOLUTION INTRAMUSCULAR; INTRAVENOUS; SUBCUTANEOUS at 01:03

## 2021-09-04 RX ADMIN — LEVETIRACETAM 500 MG: 500 TABLET ORAL at 09:40

## 2021-09-04 RX ADMIN — LEVETIRACETAM 500 MG: 500 TABLET ORAL at 20:41

## 2021-09-04 RX ADMIN — LORAZEPAM 1 MG: 1 TABLET ORAL at 11:27

## 2021-09-04 RX ADMIN — SODIUM CHLORIDE 1000 ML: 9 INJECTION, SOLUTION INTRAVENOUS at 01:05

## 2021-09-04 RX ADMIN — ONDANSETRON 4 MG: 4 TABLET, ORALLY DISINTEGRATING ORAL at 11:27

## 2021-09-04 RX ADMIN — ZIPRASIDONE MESYLATE 10 MG: 20 INJECTION, POWDER, LYOPHILIZED, FOR SOLUTION INTRAMUSCULAR at 05:50

## 2021-09-04 RX ADMIN — LORAZEPAM 1 MG: 2 INJECTION INTRAMUSCULAR; INTRAVENOUS at 12:25

## 2021-09-04 RX ADMIN — ZIPRASIDONE MESYLATE 10 MG: 20 INJECTION, POWDER, LYOPHILIZED, FOR SOLUTION INTRAMUSCULAR at 16:07

## 2021-09-04 RX ADMIN — ZIPRASIDONE MESYLATE 10 MG: 20 INJECTION, POWDER, LYOPHILIZED, FOR SOLUTION INTRAMUSCULAR at 11:34

## 2021-09-04 ASSESSMENT — PAIN SCALES - GENERAL: PAINLEVEL_OUTOF10: 0

## 2021-09-09 ENCOUNTER — TELEPHONE (OUTPATIENT)
Dept: SCHEDULING | Age: 41
End: 2021-09-09

## 2021-09-16 ENCOUNTER — TELEPHONE (OUTPATIENT)
Dept: SCHEDULING | Age: 41
End: 2021-09-16

## 2021-09-23 ENCOUNTER — APPOINTMENT (OUTPATIENT)
Dept: OBGYN | Age: 41
End: 2021-09-23

## 2021-09-24 ENCOUNTER — OFFICE VISIT (OUTPATIENT)
Dept: OBGYN | Age: 41
End: 2021-09-24

## 2021-09-24 VITALS
OXYGEN SATURATION: 99 % | RESPIRATION RATE: 12 BRPM | HEIGHT: 63 IN | BODY MASS INDEX: 27.09 KG/M2 | TEMPERATURE: 98.4 F | WEIGHT: 152.89 LBS | HEART RATE: 56 BPM | SYSTOLIC BLOOD PRESSURE: 112 MMHG | DIASTOLIC BLOOD PRESSURE: 61 MMHG

## 2021-09-24 DIAGNOSIS — Z23 NEED FOR HPV VACCINATION: ICD-10-CM

## 2021-09-24 DIAGNOSIS — Z23 NEEDS FLU SHOT: Primary | ICD-10-CM

## 2021-09-24 PROCEDURE — 90651 9VHPV VACCINE 2/3 DOSE IM: CPT | Performed by: OBSTETRICS & GYNECOLOGY

## 2021-09-24 PROCEDURE — 3074F SYST BP LT 130 MM HG: CPT | Performed by: OBSTETRICS & GYNECOLOGY

## 2021-09-24 PROCEDURE — 99213 OFFICE O/P EST LOW 20 MIN: CPT | Performed by: OBSTETRICS & GYNECOLOGY

## 2021-09-24 PROCEDURE — 90686 IIV4 VACC NO PRSV 0.5 ML IM: CPT | Performed by: OBSTETRICS & GYNECOLOGY

## 2021-09-24 PROCEDURE — 3078F DIAST BP <80 MM HG: CPT | Performed by: OBSTETRICS & GYNECOLOGY

## 2021-09-24 ASSESSMENT — PAIN SCALES - GENERAL: PAINLEVEL: 0

## 2021-09-28 ENCOUNTER — TELEPHONE (OUTPATIENT)
Dept: OBGYN | Age: 41
End: 2021-09-28

## 2021-09-28 ENCOUNTER — OFFICE VISIT (OUTPATIENT)
Dept: INTERNAL MEDICINE | Age: 41
End: 2021-09-28

## 2021-09-28 ENCOUNTER — TELEPHONE (OUTPATIENT)
Dept: SCHEDULING | Age: 41
End: 2021-09-28

## 2021-09-28 VITALS
RESPIRATION RATE: 14 BRPM | HEIGHT: 63 IN | OXYGEN SATURATION: 98 % | BODY MASS INDEX: 27.29 KG/M2 | TEMPERATURE: 98.1 F | WEIGHT: 154 LBS | SYSTOLIC BLOOD PRESSURE: 98 MMHG | HEART RATE: 51 BPM | DIASTOLIC BLOOD PRESSURE: 70 MMHG

## 2021-09-28 DIAGNOSIS — M79.644 PAIN IN FINGER OF RIGHT HAND: ICD-10-CM

## 2021-09-28 DIAGNOSIS — M62.838 MUSCLE SPASMS OF LOWER EXTREMITY, UNSPECIFIED LATERALITY: Primary | ICD-10-CM

## 2021-09-28 DIAGNOSIS — L03.011 PARONYCHIA OF FINGER OF RIGHT HAND: ICD-10-CM

## 2021-09-28 PROCEDURE — 3074F SYST BP LT 130 MM HG: CPT | Performed by: INTERNAL MEDICINE

## 2021-09-28 PROCEDURE — 3078F DIAST BP <80 MM HG: CPT | Performed by: INTERNAL MEDICINE

## 2021-09-28 PROCEDURE — 99213 OFFICE O/P EST LOW 20 MIN: CPT | Performed by: INTERNAL MEDICINE

## 2021-09-28 RX ORDER — TIZANIDINE 2 MG/1
2 TABLET ORAL EVERY 8 HOURS PRN
Qty: 90 TABLET | Refills: 0 | Status: SHIPPED | OUTPATIENT
Start: 2021-09-28 | End: 2021-12-07 | Stop reason: ALTCHOICE

## 2021-09-28 RX ORDER — AMOXICILLIN AND CLAVULANATE POTASSIUM 500; 125 MG/1; MG/1
1 TABLET, FILM COATED ORAL EVERY 12 HOURS
Qty: 14 TABLET | Refills: 0 | Status: SHIPPED | OUTPATIENT
Start: 2021-09-28 | End: 2021-10-05

## 2021-09-28 RX ORDER — NORETHINDRONE ACETATE AND ETHINYL ESTRADIOL 1.5; 3 MG/1; UG/1
TABLET ORAL
Qty: 90 TABLET | Refills: 3 | Status: ON HOLD | OUTPATIENT
Start: 2021-09-28 | End: 2021-12-18 | Stop reason: SDUPTHER

## 2021-09-28 RX ORDER — FOLIC ACID 1 MG/1
TABLET ORAL
COMMUNITY
Start: 2021-09-15 | End: 2021-12-07 | Stop reason: ALTCHOICE

## 2021-09-28 ASSESSMENT — PAIN SCALES - GENERAL: PAINLEVEL: 2

## 2021-10-14 ENCOUNTER — APPOINTMENT (OUTPATIENT)
Dept: CT IMAGING | Age: 41
End: 2021-10-14
Attending: EMERGENCY MEDICINE

## 2021-10-14 ENCOUNTER — HOSPITAL ENCOUNTER (EMERGENCY)
Age: 41
Discharge: HOME | End: 2021-10-14
Attending: EMERGENCY MEDICINE

## 2021-10-14 VITALS
BODY MASS INDEX: 23.78 KG/M2 | TEMPERATURE: 98.5 F | HEART RATE: 97 BPM | OXYGEN SATURATION: 99 % | RESPIRATION RATE: 18 BRPM | HEIGHT: 66 IN | SYSTOLIC BLOOD PRESSURE: 133 MMHG | DIASTOLIC BLOOD PRESSURE: 83 MMHG | WEIGHT: 148 LBS

## 2021-10-14 DIAGNOSIS — F10.920 ALCOHOLIC INTOXICATION WITHOUT COMPLICATION (CMD): Primary | ICD-10-CM

## 2021-10-14 PROCEDURE — 70450 CT HEAD/BRAIN W/O DYE: CPT

## 2021-10-14 PROCEDURE — 10002801 HB RX 250 W/O HCPCS: Performed by: EMERGENCY MEDICINE

## 2021-10-14 PROCEDURE — 99285 EMERGENCY DEPT VISIT HI MDM: CPT | Performed by: EMERGENCY MEDICINE

## 2021-10-14 PROCEDURE — G1004 CDSM NDSC: HCPCS

## 2021-10-14 PROCEDURE — 10002800 HB RX 250 W HCPCS: Performed by: EMERGENCY MEDICINE

## 2021-10-14 PROCEDURE — X1094 NO CHARGE VISIT: HCPCS | Performed by: EMERGENCY MEDICINE

## 2021-10-14 PROCEDURE — 99285 EMERGENCY DEPT VISIT HI MDM: CPT

## 2021-10-14 PROCEDURE — 96372 THER/PROPH/DIAG INJ SC/IM: CPT

## 2021-10-14 PROCEDURE — 10003568 RESTRAINTS VIOLENT OR SELF-DESTRUCTIVE ADULT (AGE 18 AND OLDER): Performed by: EMERGENCY MEDICINE

## 2021-10-14 RX ORDER — KETAMINE HCL IN NACL, ISO-OSM 100MG/10ML
0.5 SYRINGE (ML) INJECTION ONCE
Status: COMPLETED | OUTPATIENT
Start: 2021-10-14 | End: 2021-10-14

## 2021-10-14 RX ORDER — DIPHENHYDRAMINE HYDROCHLORIDE 50 MG/ML
25 INJECTION INTRAMUSCULAR; INTRAVENOUS ONCE
Status: COMPLETED | OUTPATIENT
Start: 2021-10-14 | End: 2021-10-14

## 2021-10-14 RX ADMIN — DIPHENHYDRAMINE HYDROCHLORIDE 25 MG: 50 INJECTION INTRAMUSCULAR; INTRAVENOUS at 00:51

## 2021-10-14 RX ADMIN — Medication 34 MG: at 01:28

## 2021-10-14 RX ADMIN — DIPHENHYDRAMINE HYDROCHLORIDE 25 MG: 50 INJECTION INTRAMUSCULAR; INTRAVENOUS at 00:24

## 2021-10-14 ASSESSMENT — PAIN SCALES - GENERAL
PAINLEVEL_OUTOF10: 0
PAINLEVEL_OUTOF10: 0

## 2021-10-28 ENCOUNTER — TELEPHONE (OUTPATIENT)
Dept: SCHEDULING | Age: 41
End: 2021-10-28

## 2021-11-11 ENCOUNTER — TELEPHONE (OUTPATIENT)
Dept: SCHEDULING | Age: 41
End: 2021-11-11

## 2021-11-18 ENCOUNTER — APPOINTMENT (OUTPATIENT)
Dept: INTERNAL MEDICINE | Age: 41
End: 2021-11-18

## 2021-11-26 ENCOUNTER — APPOINTMENT (OUTPATIENT)
Dept: OBGYN | Age: 41
End: 2021-11-26

## 2021-12-07 ENCOUNTER — OFFICE VISIT (OUTPATIENT)
Dept: INTERNAL MEDICINE | Age: 41
End: 2021-12-07

## 2021-12-07 ENCOUNTER — IMAGING SERVICES (OUTPATIENT)
Dept: GENERAL RADIOLOGY | Age: 41
End: 2021-12-07
Attending: INTERNAL MEDICINE

## 2021-12-07 VITALS
SYSTOLIC BLOOD PRESSURE: 125 MMHG | HEART RATE: 61 BPM | BODY MASS INDEX: 26.4 KG/M2 | DIASTOLIC BLOOD PRESSURE: 74 MMHG | OXYGEN SATURATION: 99 % | WEIGHT: 164.24 LBS | HEIGHT: 66 IN | TEMPERATURE: 98 F

## 2021-12-07 DIAGNOSIS — I10 ESSENTIAL (PRIMARY) HYPERTENSION: ICD-10-CM

## 2021-12-07 DIAGNOSIS — M54.2 NECK PAIN: ICD-10-CM

## 2021-12-07 DIAGNOSIS — F41.9 ANXIETY: ICD-10-CM

## 2021-12-07 DIAGNOSIS — F33.9 EPISODE OF RECURRENT MAJOR DEPRESSIVE DISORDER, UNSPECIFIED DEPRESSION EPISODE SEVERITY (CMD): ICD-10-CM

## 2021-12-07 DIAGNOSIS — R25.9 ABNORMAL INVOLUNTARY MOVEMENT: ICD-10-CM

## 2021-12-07 DIAGNOSIS — Z00.00 ANNUAL PHYSICAL EXAM: Primary | ICD-10-CM

## 2021-12-07 DIAGNOSIS — R56.9 SEIZURES (CMD): ICD-10-CM

## 2021-12-07 DIAGNOSIS — F10.20 ALCOHOL USE DISORDER, SEVERE, DEPENDENCE (CMD): ICD-10-CM

## 2021-12-07 DIAGNOSIS — E87.6 HYPOKALEMIA: ICD-10-CM

## 2021-12-07 PROCEDURE — 82746 ASSAY OF FOLIC ACID SERUM: CPT | Performed by: PSYCHIATRY & NEUROLOGY

## 2021-12-07 PROCEDURE — 99395 PREV VISIT EST AGE 18-39: CPT | Performed by: INTERNAL MEDICINE

## 2021-12-07 PROCEDURE — 3078F DIAST BP <80 MM HG: CPT | Performed by: INTERNAL MEDICINE

## 2021-12-07 PROCEDURE — 72040 X-RAY EXAM NECK SPINE 2-3 VW: CPT | Performed by: INTERNAL MEDICINE

## 2021-12-07 PROCEDURE — 80053 COMPREHEN METABOLIC PANEL: CPT | Performed by: PSYCHIATRY & NEUROLOGY

## 2021-12-07 PROCEDURE — 82306 VITAMIN D 25 HYDROXY: CPT | Performed by: PSYCHIATRY & NEUROLOGY

## 2021-12-07 PROCEDURE — 3074F SYST BP LT 130 MM HG: CPT | Performed by: INTERNAL MEDICINE

## 2021-12-07 PROCEDURE — 82607 VITAMIN B-12: CPT | Performed by: PSYCHIATRY & NEUROLOGY

## 2021-12-07 PROCEDURE — 36415 COLL VENOUS BLD VENIPUNCTURE: CPT | Performed by: PSYCHIATRY & NEUROLOGY

## 2021-12-07 PROCEDURE — 85025 COMPLETE CBC W/AUTO DIFF WBC: CPT | Performed by: PSYCHIATRY & NEUROLOGY

## 2021-12-07 PROCEDURE — 99214 OFFICE O/P EST MOD 30 MIN: CPT | Performed by: INTERNAL MEDICINE

## 2021-12-07 RX ORDER — DISULFIRAM 250 MG/1
250 TABLET ORAL
COMMUNITY
Start: 2021-10-20 | End: 2021-12-07 | Stop reason: ALTCHOICE

## 2021-12-07 RX ORDER — AMLODIPINE BESYLATE 2.5 MG/1
2.5 TABLET ORAL DAILY
Status: ON HOLD | COMMUNITY
Start: 2021-10-19 | End: 2021-12-18 | Stop reason: SDUPTHER

## 2021-12-07 ASSESSMENT — PATIENT HEALTH QUESTIONNAIRE - PHQ9
CLINICAL INTERPRETATION OF PHQ2 SCORE: NO FURTHER SCREENING NEEDED
SUM OF ALL RESPONSES TO PHQ9 QUESTIONS 1 AND 2: 0
2. FEELING DOWN, DEPRESSED OR HOPELESS: NOT AT ALL
SUM OF ALL RESPONSES TO PHQ9 QUESTIONS 1 AND 2: 0
1. LITTLE INTEREST OR PLEASURE IN DOING THINGS: NOT AT ALL

## 2021-12-08 LAB
25(OH)D3+25(OH)D2 SERPL-MCNC: 36.7 NG/ML (ref 30–100)
ALBUMIN SERPL-MCNC: 3.5 G/DL (ref 3.6–5.1)
ALBUMIN/GLOB SERPL: 1.2 {RATIO} (ref 1–2.4)
ALP SERPL-CCNC: 55 UNITS/L (ref 45–117)
ALT SERPL-CCNC: 15 UNITS/L
ANION GAP SERPL CALC-SCNC: 12 MMOL/L (ref 10–20)
AST SERPL-CCNC: 6 UNITS/L
BASOPHILS # BLD: 0.1 K/MCL (ref 0–0.3)
BASOPHILS NFR BLD: 1 %
BILIRUB SERPL-MCNC: 0.1 MG/DL (ref 0.2–1)
BUN SERPL-MCNC: 10 MG/DL (ref 6–20)
BUN/CREAT SERPL: 11 (ref 7–25)
CALCIUM SERPL-MCNC: 9.3 MG/DL (ref 8.4–10.2)
CHLORIDE SERPL-SCNC: 108 MMOL/L (ref 98–107)
CO2 SERPL-SCNC: 26 MMOL/L (ref 21–32)
CREAT SERPL-MCNC: 0.91 MG/DL (ref 0.51–0.95)
DEPRECATED RDW RBC: 46.5 FL (ref 39–50)
EOSINOPHIL # BLD: 0.2 K/MCL (ref 0–0.5)
EOSINOPHIL NFR BLD: 2 %
ERYTHROCYTE [DISTWIDTH] IN BLOOD: 13.3 % (ref 11–15)
FASTING DURATION TIME PATIENT: ABNORMAL H
FOLATE SERPL-MCNC: 12.7 NG/ML
GFR SERPLBLD BASED ON 1.73 SQ M-ARVRAT: 80 ML/MIN
GLOBULIN SER-MCNC: 3 G/DL (ref 2–4)
GLUCOSE SERPL-MCNC: 97 MG/DL (ref 70–99)
HCT VFR BLD CALC: 37.5 % (ref 36–46.5)
HGB BLD-MCNC: 12.4 G/DL (ref 12–15.5)
IMM GRANULOCYTES # BLD AUTO: 0 K/MCL (ref 0–0.2)
IMM GRANULOCYTES # BLD: 0 %
LYMPHOCYTES # BLD: 2.6 K/MCL (ref 1–4.8)
LYMPHOCYTES NFR BLD: 23 %
MCH RBC QN AUTO: 31.2 PG (ref 26–34)
MCHC RBC AUTO-ENTMCNC: 33.1 G/DL (ref 32–36.5)
MCV RBC AUTO: 94.2 FL (ref 78–100)
MONOCYTES # BLD: 0.7 K/MCL (ref 0.3–0.9)
MONOCYTES NFR BLD: 6 %
NEUTROPHILS # BLD: 8 K/MCL (ref 1.8–7.7)
NEUTROPHILS NFR BLD: 68 %
NRBC BLD MANUAL-RTO: 0 /100 WBC
PLATELET # BLD AUTO: 259 K/MCL (ref 140–450)
POTASSIUM SERPL-SCNC: 4.1 MMOL/L (ref 3.4–5.1)
PROT SERPL-MCNC: 6.5 G/DL (ref 6.4–8.2)
RBC # BLD: 3.98 MIL/MCL (ref 4–5.2)
SODIUM SERPL-SCNC: 142 MMOL/L (ref 135–145)
VIT B12 SERPL-MCNC: 649 PG/ML (ref 211–911)
WBC # BLD: 11.7 K/MCL (ref 4.2–11)

## 2021-12-13 ENCOUNTER — HOSPITAL ENCOUNTER (OUTPATIENT)
Age: 41
Setting detail: OBSERVATION
Discharge: OTHER INSTITUTION | End: 2021-12-18
Attending: EMERGENCY MEDICINE | Admitting: INTERNAL MEDICINE

## 2021-12-13 ENCOUNTER — HOSPITAL ENCOUNTER (EMERGENCY)
Age: 41
Discharge: LEFT BEFORE DISCHARGE | End: 2021-12-13

## 2021-12-13 ENCOUNTER — HOSPITAL ENCOUNTER (EMERGENCY)
Age: 41
Discharge: HOME | End: 2021-12-13
Attending: EMERGENCY MEDICINE

## 2021-12-13 VITALS
SYSTOLIC BLOOD PRESSURE: 139 MMHG | RESPIRATION RATE: 18 BRPM | BODY MASS INDEX: 25 KG/M2 | OXYGEN SATURATION: 98 % | WEIGHT: 141.09 LBS | DIASTOLIC BLOOD PRESSURE: 89 MMHG | HEIGHT: 63 IN | TEMPERATURE: 98.2 F | HEART RATE: 121 BPM

## 2021-12-13 DIAGNOSIS — F10.920 ALCOHOLIC INTOXICATION WITHOUT COMPLICATION (CMD): Primary | ICD-10-CM

## 2021-12-13 DIAGNOSIS — F10.20 ALCOHOL USE DISORDER, MODERATE, DEPENDENCE (CMD): Primary | ICD-10-CM

## 2021-12-13 LAB — GLUCOSE BLDC GLUCOMTR-MCNC: 95 MG/DL (ref 70–99)

## 2021-12-13 PROCEDURE — 99283 EMERGENCY DEPT VISIT LOW MDM: CPT

## 2021-12-13 PROCEDURE — 82962 GLUCOSE BLOOD TEST: CPT

## 2021-12-13 PROCEDURE — 99284 EMERGENCY DEPT VISIT MOD MDM: CPT | Performed by: EMERGENCY MEDICINE

## 2021-12-13 PROCEDURE — 99284 EMERGENCY DEPT VISIT MOD MDM: CPT

## 2021-12-13 ASSESSMENT — PAIN SCALES - GENERAL
PAINLEVEL_OUTOF10: 0
PAINLEVEL_OUTOF10: 0

## 2021-12-14 LAB
ANION GAP SERPL CALC-SCNC: 12 MMOL/L (ref 10–20)
ATRIAL RATE (BPM): 91
BASOPHILS # BLD: 0.1 K/MCL (ref 0–0.3)
BASOPHILS NFR BLD: 1 %
BUN SERPL-MCNC: 5 MG/DL (ref 6–20)
BUN/CREAT SERPL: 10 (ref 7–25)
CALCIUM SERPL-MCNC: 8.7 MG/DL (ref 8.4–10.2)
CHLORIDE SERPL-SCNC: 101 MMOL/L (ref 98–107)
CO2 SERPL-SCNC: 29 MMOL/L (ref 21–32)
CREAT SERPL-MCNC: 0.48 MG/DL (ref 0.51–0.95)
DEPRECATED RDW RBC: 45.8 FL (ref 39–50)
EOSINOPHIL # BLD: 0 K/MCL (ref 0–0.5)
EOSINOPHIL NFR BLD: 0 %
ERYTHROCYTE [DISTWIDTH] IN BLOOD: 13.9 % (ref 11–15)
ETHANOL SERPL-MCNC: 271 MG/DL
FASTING DURATION TIME PATIENT: ABNORMAL H
GFR SERPLBLD BASED ON 1.73 SQ M-ARVRAT: >90 ML/MIN
GLUCOSE SERPL-MCNC: 100 MG/DL (ref 70–99)
HCT VFR BLD CALC: 41.8 % (ref 36–46.5)
HGB BLD-MCNC: 14.7 G/DL (ref 12–15.5)
IMM GRANULOCYTES # BLD AUTO: 0 K/MCL (ref 0–0.2)
IMM GRANULOCYTES # BLD: 0 %
LYMPHOCYTES # BLD: 2.7 K/MCL (ref 1–4.8)
LYMPHOCYTES NFR BLD: 32 %
MCH RBC QN AUTO: 31.8 PG (ref 26–34)
MCHC RBC AUTO-ENTMCNC: 35.2 G/DL (ref 32–36.5)
MCV RBC AUTO: 90.5 FL (ref 78–100)
MONOCYTES # BLD: 0.5 K/MCL (ref 0.3–0.9)
MONOCYTES NFR BLD: 6 %
NEUTROPHILS # BLD: 5 K/MCL (ref 1.8–7.7)
NEUTROPHILS NFR BLD: 61 %
NRBC BLD MANUAL-RTO: 0 /100 WBC
P AXIS (DEGREES): 67
PLATELET # BLD AUTO: 278 K/MCL (ref 140–450)
POTASSIUM SERPL-SCNC: 3.5 MMOL/L (ref 3.4–5.1)
PR-INTERVAL (MSEC): 135
QRS-INTERVAL (MSEC): 96
QT-INTERVAL (MSEC): 391
QTC: 484
R AXIS (DEGREES): 43
RBC # BLD: 4.62 MIL/MCL (ref 4–5.2)
REPORT TEXT: NORMAL
SARS-COV-2 RNA RESP QL NAA+PROBE: NOT DETECTED
SERVICE CMNT-IMP: NORMAL
SERVICE CMNT-IMP: NORMAL
SODIUM SERPL-SCNC: 138 MMOL/L (ref 135–145)
T AXIS (DEGREES): 44
VENTRICULAR RATE EKG/MIN (BPM): 92
WBC # BLD: 8.4 K/MCL (ref 4.2–11)

## 2021-12-14 PROCEDURE — 96372 THER/PROPH/DIAG INJ SC/IM: CPT

## 2021-12-14 PROCEDURE — 82077 ASSAY SPEC XCP UR&BREATH IA: CPT | Performed by: EMERGENCY MEDICINE

## 2021-12-14 PROCEDURE — 10002800 HB RX 250 W HCPCS: Performed by: INTERNAL MEDICINE

## 2021-12-14 PROCEDURE — 10004651 HB RX, NO CHARGE ITEM: Performed by: INTERNAL MEDICINE

## 2021-12-14 PROCEDURE — G0378 HOSPITAL OBSERVATION PER HR: HCPCS

## 2021-12-14 PROCEDURE — 10002803 HB RX 637: Performed by: INTERNAL MEDICINE

## 2021-12-14 PROCEDURE — 10002807 HB RX 258: Performed by: INTERNAL MEDICINE

## 2021-12-14 PROCEDURE — 10004651 HB RX, NO CHARGE ITEM: Performed by: EMERGENCY MEDICINE

## 2021-12-14 PROCEDURE — 93005 ELECTROCARDIOGRAM TRACING: CPT | Performed by: INTERNAL MEDICINE

## 2021-12-14 PROCEDURE — 96374 THER/PROPH/DIAG INJ IV PUSH: CPT

## 2021-12-14 PROCEDURE — 99284 EMERGENCY DEPT VISIT MOD MDM: CPT | Performed by: EMERGENCY MEDICINE

## 2021-12-14 PROCEDURE — 10002803 HB RX 637: Performed by: EMERGENCY MEDICINE

## 2021-12-14 PROCEDURE — 85025 COMPLETE CBC W/AUTO DIFF WBC: CPT | Performed by: EMERGENCY MEDICINE

## 2021-12-14 PROCEDURE — 80048 BASIC METABOLIC PNL TOTAL CA: CPT | Performed by: EMERGENCY MEDICINE

## 2021-12-14 PROCEDURE — 87635 SARS-COV-2 COVID-19 AMP PRB: CPT | Performed by: EMERGENCY MEDICINE

## 2021-12-14 PROCEDURE — 96376 TX/PRO/DX INJ SAME DRUG ADON: CPT

## 2021-12-14 RX ORDER — ONDANSETRON 2 MG/ML
4 INJECTION INTRAMUSCULAR; INTRAVENOUS EVERY 8 HOURS PRN
Status: DISCONTINUED | OUTPATIENT
Start: 2021-12-14 | End: 2021-12-18 | Stop reason: HOSPADM

## 2021-12-14 RX ORDER — AMLODIPINE BESYLATE 5 MG/1
2.5 TABLET ORAL DAILY
Status: DISCONTINUED | OUTPATIENT
Start: 2021-12-14 | End: 2021-12-18 | Stop reason: HOSPADM

## 2021-12-14 RX ORDER — SODIUM CHLORIDE 9 MG/ML
INJECTION, SOLUTION INTRAVENOUS CONTINUOUS
Status: DISCONTINUED | OUTPATIENT
Start: 2021-12-14 | End: 2021-12-16

## 2021-12-14 RX ORDER — FOLIC ACID 1 MG/1
1 TABLET ORAL DAILY
Status: COMPLETED | OUTPATIENT
Start: 2021-12-14 | End: 2021-12-16

## 2021-12-14 RX ORDER — LORAZEPAM 2 MG/ML
4 INJECTION INTRAMUSCULAR
Status: DISCONTINUED | OUTPATIENT
Start: 2021-12-14 | End: 2021-12-17

## 2021-12-14 RX ORDER — 0.9 % SODIUM CHLORIDE 0.9 %
2 VIAL (ML) INJECTION EVERY 12 HOURS SCHEDULED
Status: DISCONTINUED | OUTPATIENT
Start: 2021-12-14 | End: 2021-12-18 | Stop reason: HOSPADM

## 2021-12-14 RX ORDER — ACETAMINOPHEN 325 MG/1
650 TABLET ORAL EVERY 6 HOURS PRN
Status: DISCONTINUED | OUTPATIENT
Start: 2021-12-14 | End: 2021-12-18 | Stop reason: HOSPADM

## 2021-12-14 RX ORDER — GABAPENTIN 300 MG/1
300 CAPSULE ORAL EVERY 8 HOURS SCHEDULED
Status: DISCONTINUED | OUTPATIENT
Start: 2021-12-14 | End: 2021-12-17

## 2021-12-14 RX ORDER — LEVETIRACETAM 500 MG/1
500 TABLET ORAL EVERY 12 HOURS SCHEDULED
Status: DISCONTINUED | OUTPATIENT
Start: 2021-12-14 | End: 2021-12-18 | Stop reason: HOSPADM

## 2021-12-14 RX ORDER — LORAZEPAM 2 MG/ML
3 INJECTION INTRAMUSCULAR
Status: DISCONTINUED | OUTPATIENT
Start: 2021-12-14 | End: 2021-12-17

## 2021-12-14 RX ORDER — ENOXAPARIN SODIUM 100 MG/ML
40 INJECTION SUBCUTANEOUS DAILY
Status: DISCONTINUED | OUTPATIENT
Start: 2021-12-14 | End: 2021-12-18 | Stop reason: HOSPADM

## 2021-12-14 RX ORDER — LORAZEPAM 2 MG/ML
2 INJECTION INTRAMUSCULAR
Status: DISCONTINUED | OUTPATIENT
Start: 2021-12-14 | End: 2021-12-17

## 2021-12-14 RX ORDER — LORAZEPAM 1 MG/1
2 TABLET ORAL ONCE
Status: COMPLETED | OUTPATIENT
Start: 2021-12-14 | End: 2021-12-14

## 2021-12-14 RX ORDER — ACETAMINOPHEN 325 MG/1
975 TABLET ORAL ONCE
Status: COMPLETED | OUTPATIENT
Start: 2021-12-14 | End: 2021-12-14

## 2021-12-14 RX ORDER — ONDANSETRON 4 MG/1
4 TABLET, ORALLY DISINTEGRATING ORAL ONCE
Status: COMPLETED | OUTPATIENT
Start: 2021-12-14 | End: 2021-12-14

## 2021-12-14 RX ORDER — LOPERAMIDE HYDROCHLORIDE 2 MG/1
2 CAPSULE ORAL PRN
Status: DISCONTINUED | OUTPATIENT
Start: 2021-12-14 | End: 2021-12-18 | Stop reason: HOSPADM

## 2021-12-14 RX ADMIN — LEVETIRACETAM 500 MG: 500 TABLET, FILM COATED ORAL at 01:20

## 2021-12-14 RX ADMIN — LEVETIRACETAM 500 MG: 500 TABLET, FILM COATED ORAL at 20:53

## 2021-12-14 RX ADMIN — GABAPENTIN 300 MG: 300 CAPSULE ORAL at 20:53

## 2021-12-14 RX ADMIN — TRAZODONE HYDROCHLORIDE 150 MG: 100 TABLET ORAL at 20:53

## 2021-12-14 RX ADMIN — ENOXAPARIN SODIUM 40 MG: 40 INJECTION SUBCUTANEOUS at 17:14

## 2021-12-14 RX ADMIN — AMLODIPINE BESYLATE 2.5 MG: 5 TABLET ORAL at 15:56

## 2021-12-14 RX ADMIN — ACETAMINOPHEN 975 MG: 325 TABLET ORAL at 02:36

## 2021-12-14 RX ADMIN — ONDANSETRON 4 MG: 4 TABLET, ORALLY DISINTEGRATING ORAL at 04:50

## 2021-12-14 RX ADMIN — SODIUM CHLORIDE: 9 INJECTION, SOLUTION INTRAVENOUS at 16:02

## 2021-12-14 RX ADMIN — LORAZEPAM 2 MG: 1 TABLET ORAL at 06:35

## 2021-12-14 RX ADMIN — SODIUM CHLORIDE, PRESERVATIVE FREE 2 ML: 5 INJECTION INTRAVENOUS at 20:57

## 2021-12-14 RX ADMIN — LORAZEPAM 2 MG: 2 INJECTION INTRAMUSCULAR; INTRAVENOUS at 15:55

## 2021-12-14 RX ADMIN — FOLIC ACID 1 MG: 1 TABLET ORAL at 15:55

## 2021-12-14 RX ADMIN — THIAMINE HCL TAB 100 MG 100 MG: 100 TAB at 15:56

## 2021-12-14 RX ADMIN — LORAZEPAM 2 MG: 2 INJECTION INTRAMUSCULAR; INTRAVENOUS at 20:54

## 2021-12-14 RX ADMIN — LORAZEPAM 2 MG: 2 INJECTION INTRAMUSCULAR; INTRAVENOUS at 17:14

## 2021-12-14 RX ADMIN — GABAPENTIN 300 MG: 300 CAPSULE ORAL at 15:56

## 2021-12-14 RX ADMIN — LEVETIRACETAM 500 MG: 500 TABLET, FILM COATED ORAL at 09:20

## 2021-12-14 ASSESSMENT — COGNITIVE AND FUNCTIONAL STATUS - GENERAL
DO YOU HAVE SERIOUS DIFFICULTY WALKING OR CLIMBING STAIRS: NO
ARE YOU DEAF OR DO YOU HAVE SERIOUS DIFFICULTY  HEARING: NO
DO YOU HAVE DIFFICULTY DRESSING OR BATHING: NO
ARE YOU BLIND OR DO YOU HAVE SERIOUS DIFFICULTY SEEING, EVEN WHEN WEARING GLASSES: NO
BECAUSE OF A PHYSICAL, MENTAL, OR EMOTIONAL CONDITION, DO YOU HAVE SERIOUS DIFFICULTY CONCENTRATING, REMEMBERING OR MAKING DECISIONS: NO
BECAUSE OF A PHYSICAL, MENTAL, OR EMOTIONAL CONDITION, DO YOU HAVE DIFFICULTY DOING ERRANDS ALONE: NO

## 2021-12-14 ASSESSMENT — LIFESTYLE VARIABLES
VISUAL DISTURBANCES: NOT PRESENT
PAROXYSMAL SWEATS: NO SWEAT VISIBLE
VISUAL DISTURBANCES: NOT PRESENT
ANXIETY: NO ANXIETY, AT EASE
AGITATION: NORMAL ACTIVITY
HEADACHE, FULLNESS IN HEAD: NOT PRESENT
ANXIETY: MODERATELY ANXIOUS, OR GUARDED, SO ANXIETY IS INFERRED
TREMOR: NOT VISIBLE, BUT CAN BE FELT FINGERTIP TO FINGERTIP
HEADACHE, FULLNESS IN HEAD: NOT PRESENT
PRIOR ALCOHOL TREATMENT: YES
ANXIETY: NO ANXIETY, AT EASE
PAROXYSMAL SWEATS: NO SWEAT VISIBLE
HOW OFTEN DO YOU HAVE A DRINK CONTAINING ALCOHOL: 4 OR MORE TIMES PER WEEK
TREMOR: NOT VISIBLE, BUT CAN BE FELT FINGERTIP TO FINGERTIP
ALCOHOL_USE_STATUS: BLOOD ALCOHOL INDICATING ACUTE INTOXICATION GREATER THAN 0.08
NAUSEA AND VOMITING: NO NAUSEA AND NO VOMITING
AUDITORY DISTURBANCES: NOT PRESENT
VISUAL DISTURBANCES: NOT PRESENT
PAROXYSMAL SWEATS: NO SWEAT VISIBLE
AGITATION: NORMAL ACTIVITY
HISTORY OF PROBLEMS WHEN YOU STOP DRINKING ALCOHOL: HOSPITALIZATION
HOW MANY STANDARD DRINKS CONTAINING ALCOHOL DO YOU HAVE ON A TYPICAL DAY: 10 OR MORE
HEADACHE, FULLNESS IN HEAD: NOT PRESENT
AUDIT-C TOTAL SCORE: 12
AGITATION: NORMAL ACTIVITY
HOW OFTEN DO YOU HAVE 6 OR MORE DRINKS ON ONE OCCASION: DAILY OR ALMOST DAILY
NAUSEA AND VOMITING: NO NAUSEA AND NO VOMITING
LAST DRINK YOU HAD: 48 HOURS OR LESS
NAUSEA AND VOMITING: MILD NAUSEA WITH NO VOMITING
AUDITORY DISTURBANCES: NOT PRESENT
AUDITORY DISTURBANCES: NOT PRESENT
TREMOR: NO TREMOR

## 2021-12-14 ASSESSMENT — ACTIVITIES OF DAILY LIVING (ADL)
ADL_BEFORE_ADMISSION: INDEPENDENT
ADL_SHORT_OF_BREATH: NO
ADL_SCORE: 12
CHRONIC_PAIN_PRESENT: NO
RECENT_DECLINE_ADL: NO

## 2021-12-14 ASSESSMENT — PAIN SCALES - GENERAL
PAINLEVEL_OUTOF10: 0

## 2021-12-15 PROCEDURE — 10002803 HB RX 637: Performed by: EMERGENCY MEDICINE

## 2021-12-15 PROCEDURE — 10002803 HB RX 637: Performed by: INTERNAL MEDICINE

## 2021-12-15 PROCEDURE — 96361 HYDRATE IV INFUSION ADD-ON: CPT

## 2021-12-15 PROCEDURE — 10004651 HB RX, NO CHARGE ITEM: Performed by: INTERNAL MEDICINE

## 2021-12-15 PROCEDURE — 96372 THER/PROPH/DIAG INJ SC/IM: CPT

## 2021-12-15 PROCEDURE — 10002807 HB RX 258: Performed by: INTERNAL MEDICINE

## 2021-12-15 PROCEDURE — 96376 TX/PRO/DX INJ SAME DRUG ADON: CPT

## 2021-12-15 PROCEDURE — G0378 HOSPITAL OBSERVATION PER HR: HCPCS

## 2021-12-15 PROCEDURE — 10002800 HB RX 250 W HCPCS: Performed by: INTERNAL MEDICINE

## 2021-12-15 RX ADMIN — LORAZEPAM 2 MG: 2 INJECTION INTRAMUSCULAR; INTRAVENOUS at 14:04

## 2021-12-15 RX ADMIN — LORAZEPAM 2 MG: 2 INJECTION INTRAMUSCULAR; INTRAVENOUS at 18:19

## 2021-12-15 RX ADMIN — THIAMINE HCL TAB 100 MG 100 MG: 100 TAB at 08:11

## 2021-12-15 RX ADMIN — TRAZODONE HYDROCHLORIDE 150 MG: 100 TABLET ORAL at 20:22

## 2021-12-15 RX ADMIN — LOPERAMIDE HYDROCHLORIDE 2 MG: 2 CAPSULE ORAL at 20:22

## 2021-12-15 RX ADMIN — LORAZEPAM 2 MG: 2 INJECTION INTRAMUSCULAR; INTRAVENOUS at 20:22

## 2021-12-15 RX ADMIN — LEVETIRACETAM 500 MG: 500 TABLET, FILM COATED ORAL at 08:11

## 2021-12-15 RX ADMIN — LORAZEPAM 2 MG: 2 INJECTION INTRAMUSCULAR; INTRAVENOUS at 11:12

## 2021-12-15 RX ADMIN — LORAZEPAM 2 MG: 2 INJECTION INTRAMUSCULAR; INTRAVENOUS at 05:59

## 2021-12-15 RX ADMIN — SODIUM CHLORIDE: 9 INJECTION, SOLUTION INTRAVENOUS at 01:34

## 2021-12-15 RX ADMIN — LORAZEPAM 2 MG: 2 INJECTION INTRAMUSCULAR; INTRAVENOUS at 08:11

## 2021-12-15 RX ADMIN — SODIUM CHLORIDE, PRESERVATIVE FREE 2 ML: 5 INJECTION INTRAVENOUS at 08:12

## 2021-12-15 RX ADMIN — LEVETIRACETAM 500 MG: 500 TABLET, FILM COATED ORAL at 20:22

## 2021-12-15 RX ADMIN — FOLIC ACID 1 MG: 1 TABLET ORAL at 08:11

## 2021-12-15 RX ADMIN — ENOXAPARIN SODIUM 40 MG: 40 INJECTION SUBCUTANEOUS at 08:11

## 2021-12-15 RX ADMIN — GABAPENTIN 300 MG: 300 CAPSULE ORAL at 14:04

## 2021-12-15 RX ADMIN — GABAPENTIN 300 MG: 300 CAPSULE ORAL at 20:22

## 2021-12-15 RX ADMIN — SODIUM CHLORIDE: 9 INJECTION, SOLUTION INTRAVENOUS at 11:13

## 2021-12-15 RX ADMIN — SODIUM CHLORIDE: 9 INJECTION, SOLUTION INTRAVENOUS at 21:20

## 2021-12-15 RX ADMIN — GABAPENTIN 300 MG: 300 CAPSULE ORAL at 05:59

## 2021-12-15 RX ADMIN — AMLODIPINE BESYLATE 2.5 MG: 5 TABLET ORAL at 08:11

## 2021-12-15 RX ADMIN — LORAZEPAM 2 MG: 2 INJECTION INTRAMUSCULAR; INTRAVENOUS at 00:12

## 2021-12-15 ASSESSMENT — LIFESTYLE VARIABLES
HEADACHE, FULLNESS IN HEAD: NOT PRESENT
TREMOR: NO TREMOR
HEADACHE, FULLNESS IN HEAD: NOT PRESENT
AGITATION: NORMAL ACTIVITY
AGITATION: SOMEWHAT MORE THAN NORMAL ACTIVITY
VISUAL DISTURBANCES: NOT PRESENT
ANXIETY: NO ANXIETY, AT EASE
NAUSEA AND VOMITING: NO NAUSEA AND NO VOMITING
PAROXYSMAL SWEATS: BARELY PERCEPTIBLE SWEATING, PALMS MOIST
PAROXYSMAL SWEATS: NO SWEAT VISIBLE
NAUSEA AND VOMITING: NO NAUSEA AND NO VOMITING
TREMOR: NO TREMOR
AUDITORY DISTURBANCES: NOT PRESENT
TREMOR: 2
HEADACHE, FULLNESS IN HEAD: NOT PRESENT
AGITATION: NORMAL ACTIVITY
AUDITORY DISTURBANCES: NOT PRESENT
AGITATION: SOMEWHAT MORE THAN NORMAL ACTIVITY
AUDITORY DISTURBANCES: NOT PRESENT
VISUAL DISTURBANCES: NOT PRESENT
ANXIETY: 2
NAUSEA AND VOMITING: NO NAUSEA AND NO VOMITING
AUDITORY DISTURBANCES: NOT PRESENT
PAROXYSMAL SWEATS: NO SWEAT VISIBLE
ANXIETY: 2
ANXIETY: 2
NAUSEA AND VOMITING: NO NAUSEA AND NO VOMITING
NAUSEA AND VOMITING: NO NAUSEA AND NO VOMITING
TREMOR: NO TREMOR
TREMOR: NOT VISIBLE, BUT CAN BE FELT FINGERTIP TO FINGERTIP
AUDITORY DISTURBANCES: NOT PRESENT
NAUSEA AND VOMITING: 3
VISUAL DISTURBANCES: NOT PRESENT
VISUAL DISTURBANCES: NOT PRESENT
AGITATION: SOMEWHAT MORE THAN NORMAL ACTIVITY
VISUAL DISTURBANCES: NOT PRESENT
NAUSEA AND VOMITING: NO NAUSEA AND NO VOMITING
AUDITORY DISTURBANCES: NOT PRESENT
TREMOR: NO TREMOR
AGITATION: NORMAL ACTIVITY
HEADACHE, FULLNESS IN HEAD: NOT PRESENT
VISUAL DISTURBANCES: NOT PRESENT
PAROXYSMAL SWEATS: NO SWEAT VISIBLE
PAROXYSMAL SWEATS: BARELY PERCEPTIBLE SWEATING, PALMS MOIST
HEADACHE, FULLNESS IN HEAD: NOT PRESENT
AUDITORY DISTURBANCES: NOT PRESENT
HEADACHE, FULLNESS IN HEAD: NOT PRESENT
ANXIETY: NO ANXIETY, AT EASE
PAROXYSMAL SWEATS: NO SWEAT VISIBLE
AGITATION: SOMEWHAT MORE THAN NORMAL ACTIVITY
VISUAL DISTURBANCES: NOT PRESENT
TREMOR: NO TREMOR
ANXIETY: 2
ANXIETY: NO ANXIETY, AT EASE
PAROXYSMAL SWEATS: NO SWEAT VISIBLE
HEADACHE, FULLNESS IN HEAD: NOT PRESENT

## 2021-12-15 ASSESSMENT — PAIN SCALES - GENERAL
PAINLEVEL_OUTOF10: 0
PAINLEVEL_OUTOF10: 0

## 2021-12-16 PROCEDURE — 10002807 HB RX 258: Performed by: INTERNAL MEDICINE

## 2021-12-16 PROCEDURE — G0378 HOSPITAL OBSERVATION PER HR: HCPCS

## 2021-12-16 PROCEDURE — 96361 HYDRATE IV INFUSION ADD-ON: CPT

## 2021-12-16 PROCEDURE — 10002803 HB RX 637: Performed by: INTERNAL MEDICINE

## 2021-12-16 PROCEDURE — 10004651 HB RX, NO CHARGE ITEM: Performed by: INTERNAL MEDICINE

## 2021-12-16 PROCEDURE — 96372 THER/PROPH/DIAG INJ SC/IM: CPT

## 2021-12-16 PROCEDURE — 10002800 HB RX 250 W HCPCS: Performed by: INTERNAL MEDICINE

## 2021-12-16 PROCEDURE — 10002803 HB RX 637: Performed by: EMERGENCY MEDICINE

## 2021-12-16 PROCEDURE — 96376 TX/PRO/DX INJ SAME DRUG ADON: CPT

## 2021-12-16 RX ADMIN — LEVETIRACETAM 500 MG: 500 TABLET, FILM COATED ORAL at 09:15

## 2021-12-16 RX ADMIN — TRAZODONE HYDROCHLORIDE 150 MG: 100 TABLET ORAL at 21:00

## 2021-12-16 RX ADMIN — LORAZEPAM 2 MG: 2 INJECTION INTRAMUSCULAR; INTRAVENOUS at 17:19

## 2021-12-16 RX ADMIN — ENOXAPARIN SODIUM 40 MG: 40 INJECTION SUBCUTANEOUS at 09:16

## 2021-12-16 RX ADMIN — SODIUM CHLORIDE, PRESERVATIVE FREE 2 ML: 5 INJECTION INTRAVENOUS at 09:16

## 2021-12-16 RX ADMIN — FOLIC ACID 1 MG: 1 TABLET ORAL at 09:15

## 2021-12-16 RX ADMIN — THIAMINE HCL TAB 100 MG 100 MG: 100 TAB at 09:15

## 2021-12-16 RX ADMIN — ACETAMINOPHEN 650 MG: 325 TABLET ORAL at 09:15

## 2021-12-16 RX ADMIN — LEVETIRACETAM 500 MG: 500 TABLET, FILM COATED ORAL at 21:00

## 2021-12-16 RX ADMIN — SODIUM CHLORIDE: 9 INJECTION, SOLUTION INTRAVENOUS at 06:59

## 2021-12-16 RX ADMIN — SODIUM CHLORIDE, PRESERVATIVE FREE 2 ML: 5 INJECTION INTRAVENOUS at 21:02

## 2021-12-16 RX ADMIN — LORAZEPAM 2 MG: 2 INJECTION INTRAMUSCULAR; INTRAVENOUS at 00:11

## 2021-12-16 RX ADMIN — GABAPENTIN 300 MG: 300 CAPSULE ORAL at 06:05

## 2021-12-16 RX ADMIN — SODIUM CHLORIDE: 9 INJECTION, SOLUTION INTRAVENOUS at 17:20

## 2021-12-16 RX ADMIN — GABAPENTIN 300 MG: 300 CAPSULE ORAL at 21:00

## 2021-12-16 RX ADMIN — LORAZEPAM 2 MG: 2 INJECTION INTRAMUSCULAR; INTRAVENOUS at 21:00

## 2021-12-16 RX ADMIN — GABAPENTIN 300 MG: 300 CAPSULE ORAL at 14:41

## 2021-12-16 RX ADMIN — LORAZEPAM 2 MG: 2 INJECTION INTRAMUSCULAR; INTRAVENOUS at 12:16

## 2021-12-16 RX ADMIN — AMLODIPINE BESYLATE 2.5 MG: 5 TABLET ORAL at 09:15

## 2021-12-16 RX ADMIN — LOPERAMIDE HYDROCHLORIDE 2 MG: 2 CAPSULE ORAL at 14:55

## 2021-12-16 ASSESSMENT — LIFESTYLE VARIABLES
AUDITORY DISTURBANCES: NOT PRESENT
NAUSEA AND VOMITING: NO NAUSEA AND NO VOMITING
AGITATION: NORMAL ACTIVITY
ANXIETY: NO ANXIETY, AT EASE
VISUAL DISTURBANCES: NOT PRESENT
HEADACHE, FULLNESS IN HEAD: NOT PRESENT
ANXIETY: MILDLY ANXIOUS
HEADACHE, FULLNESS IN HEAD: NOT PRESENT
PAROXYSMAL SWEATS: BARELY PERCEPTIBLE SWEATING, PALMS MOIST
PAROXYSMAL SWEATS: BARELY PERCEPTIBLE SWEATING, PALMS MOIST
NAUSEA AND VOMITING: NO NAUSEA AND NO VOMITING
NAUSEA AND VOMITING: NO NAUSEA AND NO VOMITING
AGITATION: NORMAL ACTIVITY
AGITATION: SOMEWHAT MORE THAN NORMAL ACTIVITY
ANXIETY: NO ANXIETY, AT EASE
PAROXYSMAL SWEATS: NO SWEAT VISIBLE
AUDITORY DISTURBANCES: NOT PRESENT
ANXIETY: NO ANXIETY, AT EASE
PAROXYSMAL SWEATS: BARELY PERCEPTIBLE SWEATING, PALMS MOIST
PAROXYSMAL SWEATS: BARELY PERCEPTIBLE SWEATING, PALMS MOIST
HEADACHE, FULLNESS IN HEAD: NOT PRESENT
VISUAL DISTURBANCES: NOT PRESENT
NAUSEA AND VOMITING: NO NAUSEA AND NO VOMITING
ANXIETY: 3
TREMOR: 2
PAROXYSMAL SWEATS: NO SWEAT VISIBLE
AUDITORY DISTURBANCES: NOT PRESENT
VISUAL DISTURBANCES: NOT PRESENT
TREMOR: NO TREMOR
HEADACHE, FULLNESS IN HEAD: NOT PRESENT
NAUSEA AND VOMITING: NO NAUSEA AND NO VOMITING
AGITATION: SOMEWHAT MORE THAN NORMAL ACTIVITY
TREMOR: NO TREMOR
TREMOR: 2
NAUSEA AND VOMITING: NO NAUSEA AND NO VOMITING
PAROXYSMAL SWEATS: NO SWEAT VISIBLE
VISUAL DISTURBANCES: NOT PRESENT
ANXIETY: 2
TREMOR: NO TREMOR
HEADACHE, FULLNESS IN HEAD: NOT PRESENT
AUDITORY DISTURBANCES: NOT PRESENT
AGITATION: NORMAL ACTIVITY
AGITATION: SOMEWHAT MORE THAN NORMAL ACTIVITY
NAUSEA AND VOMITING: NO NAUSEA AND NO VOMITING
PAROXYSMAL SWEATS: 3
TREMOR: NO TREMOR
AGITATION: NORMAL ACTIVITY
VISUAL DISTURBANCES: NOT PRESENT
VISUAL DISTURBANCES: NOT PRESENT
AUDITORY DISTURBANCES: NOT PRESENT
TREMOR: NOT VISIBLE, BUT CAN BE FELT FINGERTIP TO FINGERTIP
TREMOR: NO TREMOR
AGITATION: NORMAL ACTIVITY
NAUSEA AND VOMITING: NO NAUSEA AND NO VOMITING
AUDITORY DISTURBANCES: NOT PRESENT
HEADACHE, FULLNESS IN HEAD: NOT PRESENT
AUDITORY DISTURBANCES: NOT PRESENT
TREMOR: NOT VISIBLE, BUT CAN BE FELT FINGERTIP TO FINGERTIP
TREMOR: NO TREMOR
PAROXYSMAL SWEATS: NO SWEAT VISIBLE
HEADACHE, FULLNESS IN HEAD: NOT PRESENT
NAUSEA AND VOMITING: NO NAUSEA AND NO VOMITING
AGITATION: NORMAL ACTIVITY
VISUAL DISTURBANCES: NOT PRESENT
AUDITORY DISTURBANCES: NOT PRESENT
NAUSEA AND VOMITING: NO NAUSEA AND NO VOMITING
PAROXYSMAL SWEATS: NO SWEAT VISIBLE
VISUAL DISTURBANCES: NOT PRESENT
ANXIETY: NO ANXIETY, AT EASE
HEADACHE, FULLNESS IN HEAD: NOT PRESENT
ANXIETY: MILDLY ANXIOUS
VISUAL DISTURBANCES: NOT PRESENT
HEADACHE, FULLNESS IN HEAD: NOT PRESENT
VISUAL DISTURBANCES: NOT PRESENT
VISUAL DISTURBANCES: NOT PRESENT
AUDITORY DISTURBANCES: NOT PRESENT
AGITATION: NORMAL ACTIVITY
HEADACHE, FULLNESS IN HEAD: NOT PRESENT
AGITATION: SOMEWHAT MORE THAN NORMAL ACTIVITY
TREMOR: NO TREMOR
ANXIETY: NO ANXIETY, AT EASE
ANXIETY: MILDLY ANXIOUS
ANXIETY: NO ANXIETY, AT EASE
AUDITORY DISTURBANCES: NOT PRESENT
PAROXYSMAL SWEATS: 3
NAUSEA AND VOMITING: NO NAUSEA AND NO VOMITING
HEADACHE, FULLNESS IN HEAD: NOT PRESENT
AUDITORY DISTURBANCES: NOT PRESENT

## 2021-12-16 ASSESSMENT — PAIN SCALES - GENERAL
PAINLEVEL_OUTOF10: 3
PAINLEVEL_OUTOF10: 0

## 2021-12-17 LAB
ANION GAP SERPL CALC-SCNC: 10 MMOL/L (ref 10–20)
BUN SERPL-MCNC: 9 MG/DL (ref 6–20)
BUN/CREAT SERPL: 20 (ref 7–25)
CALCIUM SERPL-MCNC: 9.1 MG/DL (ref 8.4–10.2)
CHLORIDE SERPL-SCNC: 106 MMOL/L (ref 98–107)
CO2 SERPL-SCNC: 24 MMOL/L (ref 21–32)
CREAT SERPL-MCNC: 0.45 MG/DL (ref 0.51–0.95)
FASTING DURATION TIME PATIENT: ABNORMAL H
GFR SERPLBLD BASED ON 1.73 SQ M-ARVRAT: >90 ML/MIN
GLUCOSE SERPL-MCNC: 93 MG/DL (ref 70–99)
MAGNESIUM SERPL-MCNC: 2.1 MG/DL (ref 1.7–2.4)
POTASSIUM SERPL-SCNC: 3.9 MMOL/L (ref 3.4–5.1)
SODIUM SERPL-SCNC: 136 MMOL/L (ref 135–145)

## 2021-12-17 PROCEDURE — 80048 BASIC METABOLIC PNL TOTAL CA: CPT | Performed by: INTERNAL MEDICINE

## 2021-12-17 PROCEDURE — 96372 THER/PROPH/DIAG INJ SC/IM: CPT

## 2021-12-17 PROCEDURE — 10002803 HB RX 637: Performed by: PSYCHIATRY & NEUROLOGY

## 2021-12-17 PROCEDURE — U0003 INFECTIOUS AGENT DETECTION BY NUCLEIC ACID (DNA OR RNA); SEVERE ACUTE RESPIRATORY SYNDROME CORONAVIRUS 2 (SARS-COV-2) (CORONAVIRUS DISEASE [COVID-19]), AMPLIFIED PROBE TECHNIQUE, MAKING USE OF HIGH THROUGHPUT TECHNOLOGIES AS DESCRIBED BY CMS-2020-01-R: HCPCS | Performed by: INTERNAL MEDICINE

## 2021-12-17 PROCEDURE — 10004651 HB RX, NO CHARGE ITEM: Performed by: INTERNAL MEDICINE

## 2021-12-17 PROCEDURE — 10002803 HB RX 637: Performed by: INTERNAL MEDICINE

## 2021-12-17 PROCEDURE — 83735 ASSAY OF MAGNESIUM: CPT | Performed by: INTERNAL MEDICINE

## 2021-12-17 PROCEDURE — 96376 TX/PRO/DX INJ SAME DRUG ADON: CPT

## 2021-12-17 PROCEDURE — 10002803 HB RX 637: Performed by: EMERGENCY MEDICINE

## 2021-12-17 PROCEDURE — G0378 HOSPITAL OBSERVATION PER HR: HCPCS

## 2021-12-17 PROCEDURE — 10002800 HB RX 250 W HCPCS: Performed by: INTERNAL MEDICINE

## 2021-12-17 PROCEDURE — 36415 COLL VENOUS BLD VENIPUNCTURE: CPT | Performed by: INTERNAL MEDICINE

## 2021-12-17 PROCEDURE — 90792 PSYCH DIAG EVAL W/MED SRVCS: CPT | Performed by: INTERNAL MEDICINE

## 2021-12-17 RX ORDER — BUSPIRONE HYDROCHLORIDE 15 MG/1
15 TABLET ORAL 2 TIMES DAILY
Qty: 60 TABLET | Refills: 0 | Status: SHIPPED | OUTPATIENT
Start: 2021-12-18 | End: 2021-12-17 | Stop reason: SDUPTHER

## 2021-12-17 RX ORDER — BUSPIRONE HYDROCHLORIDE 5 MG/1
10 TABLET ORAL EVERY 12 HOURS SCHEDULED
Status: DISCONTINUED | OUTPATIENT
Start: 2021-12-17 | End: 2021-12-18 | Stop reason: HOSPADM

## 2021-12-17 RX ORDER — LORAZEPAM 2 MG/ML
1 INJECTION INTRAMUSCULAR
Status: DISCONTINUED | OUTPATIENT
Start: 2021-12-17 | End: 2021-12-18 | Stop reason: HOSPADM

## 2021-12-17 RX ORDER — TRAZODONE HYDROCHLORIDE 150 MG/1
150 TABLET ORAL NIGHTLY
Qty: 30 TABLET | Refills: 0 | Status: SHIPPED | OUTPATIENT
Start: 2021-12-18 | End: 2022-02-17 | Stop reason: SDUPTHER

## 2021-12-17 RX ORDER — LORAZEPAM 2 MG/ML
2 INJECTION INTRAMUSCULAR
Status: DISCONTINUED | OUTPATIENT
Start: 2021-12-17 | End: 2021-12-17

## 2021-12-17 RX ORDER — SERTRALINE HYDROCHLORIDE 150 MG/1
150 CAPSULE ORAL DAILY
Qty: 30 CAPSULE | Refills: 0 | Status: SHIPPED | OUTPATIENT
Start: 2021-12-19 | End: 2021-12-18 | Stop reason: SDUPTHER

## 2021-12-17 RX ORDER — BUSPIRONE HYDROCHLORIDE 15 MG/1
15 TABLET ORAL 2 TIMES DAILY
Qty: 60 TABLET | Refills: 0 | Status: SHIPPED | OUTPATIENT
Start: 2021-12-18 | End: 2021-12-18 | Stop reason: SDUPTHER

## 2021-12-17 RX ORDER — LORAZEPAM 1 MG/1
1 TABLET ORAL
Status: DISCONTINUED | OUTPATIENT
Start: 2021-12-17 | End: 2021-12-18 | Stop reason: HOSPADM

## 2021-12-17 RX ORDER — SERTRALINE HYDROCHLORIDE 150 MG/1
150 CAPSULE ORAL DAILY
Qty: 30 CAPSULE | Refills: 0 | Status: SHIPPED | OUTPATIENT
Start: 2021-12-19 | End: 2021-12-17 | Stop reason: SDUPTHER

## 2021-12-17 RX ORDER — LORAZEPAM 2 MG/ML
1 INJECTION INTRAMUSCULAR
Status: DISCONTINUED | OUTPATIENT
Start: 2021-12-17 | End: 2021-12-17

## 2021-12-17 RX ORDER — TRAZODONE HYDROCHLORIDE 150 MG/1
150 TABLET ORAL NIGHTLY
Qty: 30 TABLET | Refills: 0 | Status: SHIPPED | OUTPATIENT
Start: 2021-12-18 | End: 2021-12-17

## 2021-12-17 RX ADMIN — LORAZEPAM 2 MG: 2 INJECTION INTRAMUSCULAR; INTRAVENOUS at 09:23

## 2021-12-17 RX ADMIN — SODIUM CHLORIDE, PRESERVATIVE FREE 2 ML: 5 INJECTION INTRAVENOUS at 21:40

## 2021-12-17 RX ADMIN — BUSPIRONE HYDROCHLORIDE 10 MG: 5 TABLET ORAL at 11:30

## 2021-12-17 RX ADMIN — ACETAMINOPHEN 650 MG: 325 TABLET ORAL at 21:37

## 2021-12-17 RX ADMIN — ACETAMINOPHEN 650 MG: 325 TABLET ORAL at 09:16

## 2021-12-17 RX ADMIN — LORAZEPAM 1 MG: 1 TABLET ORAL at 21:37

## 2021-12-17 RX ADMIN — LEVETIRACETAM 500 MG: 500 TABLET, FILM COATED ORAL at 09:17

## 2021-12-17 RX ADMIN — AMLODIPINE BESYLATE 2.5 MG: 5 TABLET ORAL at 09:16

## 2021-12-17 RX ADMIN — BUSPIRONE HYDROCHLORIDE 10 MG: 5 TABLET ORAL at 20:53

## 2021-12-17 RX ADMIN — ENOXAPARIN SODIUM 40 MG: 40 INJECTION SUBCUTANEOUS at 09:24

## 2021-12-17 RX ADMIN — LORAZEPAM 1 MG: 1 TABLET ORAL at 18:55

## 2021-12-17 RX ADMIN — GABAPENTIN 300 MG: 300 CAPSULE ORAL at 04:52

## 2021-12-17 RX ADMIN — SODIUM CHLORIDE, PRESERVATIVE FREE 2 ML: 5 INJECTION INTRAVENOUS at 09:57

## 2021-12-17 RX ADMIN — LORAZEPAM 1 MG: 1 TABLET ORAL at 16:08

## 2021-12-17 RX ADMIN — LEVETIRACETAM 500 MG: 500 TABLET, FILM COATED ORAL at 20:53

## 2021-12-17 RX ADMIN — TRAZODONE HYDROCHLORIDE 150 MG: 100 TABLET ORAL at 20:53

## 2021-12-17 ASSESSMENT — LIFESTYLE VARIABLES
NAUSEA AND VOMITING: NO NAUSEA AND NO VOMITING
HEADACHE, FULLNESS IN HEAD: NOT PRESENT
NAUSEA AND VOMITING: NO NAUSEA AND NO VOMITING
PAROXYSMAL SWEATS: NO SWEAT VISIBLE
HEADACHE, FULLNESS IN HEAD: NOT PRESENT
HEADACHE, FULLNESS IN HEAD: NOT PRESENT
VISUAL DISTURBANCES: NOT PRESENT
AUDITORY DISTURBANCES: NOT PRESENT
AUDITORY DISTURBANCES: NOT PRESENT
TREMOR: NO TREMOR
AGITATION: 2
NAUSEA AND VOMITING: NO NAUSEA AND NO VOMITING
VISUAL DISTURBANCES: NOT PRESENT
TREMOR: NO TREMOR
TREMOR: NO TREMOR
PAROXYSMAL SWEATS: NO SWEAT VISIBLE
ANXIETY: 2
PAROXYSMAL SWEATS: NO SWEAT VISIBLE
PAROXYSMAL SWEATS: NO SWEAT VISIBLE
VISUAL DISTURBANCES: NOT PRESENT
NAUSEA AND VOMITING: NO NAUSEA AND NO VOMITING
PAROXYSMAL SWEATS: NO SWEAT VISIBLE
AUDITORY DISTURBANCES: NOT PRESENT
ANXIETY: MILDLY ANXIOUS
AUDITORY DISTURBANCES: NOT PRESENT
HEADACHE, FULLNESS IN HEAD: NOT PRESENT
ANXIETY: MODERATELY ANXIOUS, OR GUARDED, SO ANXIETY IS INFERRED
AGITATION: NORMAL ACTIVITY
VISUAL DISTURBANCES: NOT PRESENT
TREMOR: NO TREMOR
ANXIETY: 2
AGITATION: MODERATELY FIDGETY AND RESTLESS
TREMOR: NO TREMOR
HEADACHE, FULLNESS IN HEAD: NOT PRESENT
AUDITORY DISTURBANCES: NOT PRESENT
AGITATION: SOMEWHAT MORE THAN NORMAL ACTIVITY
VISUAL DISTURBANCES: NOT PRESENT
NAUSEA AND VOMITING: NO NAUSEA AND NO VOMITING
ANXIETY: NO ANXIETY, AT EASE
AGITATION: SOMEWHAT MORE THAN NORMAL ACTIVITY

## 2021-12-17 ASSESSMENT — PAIN SCALES - GENERAL
PAINLEVEL_OUTOF10: 0
PAINLEVEL_OUTOF10: 2
PAINLEVEL_OUTOF10: 3
PAINLEVEL_OUTOF10: 6
PAINLEVEL_OUTOF10: 0
PAINLEVEL_OUTOF10: 0

## 2021-12-18 VITALS
HEIGHT: 63 IN | SYSTOLIC BLOOD PRESSURE: 132 MMHG | BODY MASS INDEX: 25.47 KG/M2 | OXYGEN SATURATION: 98 % | DIASTOLIC BLOOD PRESSURE: 86 MMHG | RESPIRATION RATE: 16 BRPM | TEMPERATURE: 97.9 F | WEIGHT: 143.74 LBS | HEART RATE: 70 BPM

## 2021-12-18 LAB
SARS-COV-2 RNA RESP QL NAA+PROBE: NOT DETECTED
SERVICE CMNT-IMP: NORMAL
SERVICE CMNT-IMP: NORMAL

## 2021-12-18 PROCEDURE — G0378 HOSPITAL OBSERVATION PER HR: HCPCS

## 2021-12-18 PROCEDURE — 10004651 HB RX, NO CHARGE ITEM: Performed by: INTERNAL MEDICINE

## 2021-12-18 PROCEDURE — 10002803 HB RX 637: Performed by: EMERGENCY MEDICINE

## 2021-12-18 PROCEDURE — 10002803 HB RX 637: Performed by: INTERNAL MEDICINE

## 2021-12-18 PROCEDURE — 10002803 HB RX 637: Performed by: PSYCHIATRY & NEUROLOGY

## 2021-12-18 RX ORDER — LEVETIRACETAM 500 MG/1
500 TABLET ORAL 2 TIMES DAILY
Qty: 60 TABLET | Refills: 0 | Status: SHIPPED | OUTPATIENT
Start: 2021-12-18 | End: 2022-01-03 | Stop reason: SDUPTHER

## 2021-12-18 RX ORDER — AMLODIPINE BESYLATE 2.5 MG/1
2.5 TABLET ORAL DAILY
Qty: 30 TABLET | Refills: 0 | Status: SHIPPED | OUTPATIENT
Start: 2021-12-18 | End: 2022-02-17 | Stop reason: SDUPTHER

## 2021-12-18 RX ORDER — BUSPIRONE HYDROCHLORIDE 15 MG/1
15 TABLET ORAL 2 TIMES DAILY
Qty: 60 TABLET | Refills: 0 | Status: SHIPPED | OUTPATIENT
Start: 2021-12-18 | End: 2022-03-03 | Stop reason: ALTCHOICE

## 2021-12-18 RX ORDER — SERTRALINE HYDROCHLORIDE 150 MG/1
150 CAPSULE ORAL DAILY
Qty: 30 CAPSULE | Refills: 0 | Status: SHIPPED | OUTPATIENT
Start: 2021-12-19 | End: 2022-03-03 | Stop reason: SDUPTHER

## 2021-12-18 RX ORDER — NORETHINDRONE ACETATE AND ETHINYL ESTRADIOL .03; 1.5 MG/1; MG/1
1 TABLET ORAL DAILY
Qty: 90 TABLET | Refills: 3 | Status: SHIPPED | OUTPATIENT
Start: 2021-12-18 | End: 2022-03-17 | Stop reason: SDUPTHER

## 2021-12-18 RX ADMIN — SERTRALINE HYDROCHLORIDE 100 MG: 50 TABLET ORAL at 09:07

## 2021-12-18 RX ADMIN — AMLODIPINE BESYLATE 2.5 MG: 5 TABLET ORAL at 09:05

## 2021-12-18 RX ADMIN — SODIUM CHLORIDE, PRESERVATIVE FREE 2 ML: 5 INJECTION INTRAVENOUS at 09:08

## 2021-12-18 RX ADMIN — BUSPIRONE HYDROCHLORIDE 10 MG: 5 TABLET ORAL at 09:11

## 2021-12-18 RX ADMIN — ACETAMINOPHEN 650 MG: 325 TABLET ORAL at 11:34

## 2021-12-18 RX ADMIN — LEVETIRACETAM 500 MG: 500 TABLET, FILM COATED ORAL at 09:07

## 2021-12-18 ASSESSMENT — LIFESTYLE VARIABLES
AUDITORY DISTURBANCES: NOT PRESENT
AUDITORY DISTURBANCES: NOT PRESENT
PAROXYSMAL SWEATS: NO SWEAT VISIBLE
PAROXYSMAL SWEATS: NO SWEAT VISIBLE
VISUAL DISTURBANCES: NOT PRESENT
AUDITORY DISTURBANCES: NOT PRESENT
TREMOR: NO TREMOR
AUDITORY DISTURBANCES: NOT PRESENT
PAROXYSMAL SWEATS: NO SWEAT VISIBLE
TREMOR: NO TREMOR
HEADACHE, FULLNESS IN HEAD: NOT PRESENT
NAUSEA AND VOMITING: NO NAUSEA AND NO VOMITING
AGITATION: SOMEWHAT MORE THAN NORMAL ACTIVITY
HEADACHE, FULLNESS IN HEAD: NOT PRESENT
AGITATION: NORMAL ACTIVITY
NAUSEA AND VOMITING: NO NAUSEA AND NO VOMITING
ANXIETY: MILDLY ANXIOUS
ANXIETY: NO ANXIETY, AT EASE
TREMOR: NO TREMOR
HEADACHE, FULLNESS IN HEAD: NOT PRESENT
AGITATION: NORMAL ACTIVITY
VISUAL DISTURBANCES: NOT PRESENT
PAROXYSMAL SWEATS: NO SWEAT VISIBLE
AGITATION: NORMAL ACTIVITY
VISUAL DISTURBANCES: NOT PRESENT
ANXIETY: NO ANXIETY, AT EASE
HEADACHE, FULLNESS IN HEAD: NOT PRESENT
TREMOR: NO TREMOR
ANXIETY: NO ANXIETY, AT EASE
NAUSEA AND VOMITING: NO NAUSEA AND NO VOMITING
NAUSEA AND VOMITING: NO NAUSEA AND NO VOMITING
VISUAL DISTURBANCES: NOT PRESENT

## 2021-12-18 ASSESSMENT — PAIN SCALES - GENERAL: PAINLEVEL_OUTOF10: 0

## 2021-12-21 ENCOUNTER — TELEPHONE (OUTPATIENT)
Dept: SCHEDULING | Age: 41
End: 2021-12-21

## 2021-12-28 ENCOUNTER — TELEPHONE (OUTPATIENT)
Dept: SCHEDULING | Age: 41
End: 2021-12-28

## 2021-12-29 ENCOUNTER — TELEPHONE (OUTPATIENT)
Dept: NEUROLOGY | Age: 41
End: 2021-12-29

## 2021-12-30 ENCOUNTER — TELEPHONE (OUTPATIENT)
Dept: SCHEDULING | Age: 41
End: 2021-12-30

## 2022-01-03 ENCOUNTER — TELEPHONE (OUTPATIENT)
Dept: SCHEDULING | Age: 42
End: 2022-01-03

## 2022-01-03 ENCOUNTER — APPOINTMENT (OUTPATIENT)
Dept: NEUROLOGY | Age: 42
End: 2022-01-03

## 2022-01-03 ENCOUNTER — V-VISIT (OUTPATIENT)
Dept: NEUROLOGY | Age: 42
End: 2022-01-03

## 2022-01-03 DIAGNOSIS — R56.9 SEIZURES (CMD): Primary | ICD-10-CM

## 2022-01-03 PROCEDURE — 99245 OFF/OP CONSLTJ NEW/EST HI 55: CPT | Performed by: PSYCHIATRY & NEUROLOGY

## 2022-01-03 RX ORDER — SERTRALINE HYDROCHLORIDE 100 MG/1
1.5 TABLET, FILM COATED ORAL DAILY
COMMUNITY
Start: 2021-12-18 | End: 2022-01-03 | Stop reason: SDUPTHER

## 2022-01-03 RX ORDER — BENZTROPINE MESYLATE 0.5 MG/1
1 TABLET ORAL 2 TIMES DAILY
COMMUNITY
Start: 2021-12-30 | End: 2022-02-17

## 2022-01-03 RX ORDER — LEVETIRACETAM 500 MG/1
500 TABLET ORAL 2 TIMES DAILY
Qty: 60 TABLET | Refills: 13 | Status: SHIPPED | OUTPATIENT
Start: 2022-01-03 | End: 2023-01-19 | Stop reason: SDUPTHER

## 2022-01-03 RX ORDER — IBUPROFEN 600 MG/1
1 TABLET ORAL EVERY 6 HOURS PRN
COMMUNITY
Start: 2021-12-21 | End: 2023-07-19 | Stop reason: DRUGHIGH

## 2022-01-03 RX ORDER — GABAPENTIN 400 MG/1
1 CAPSULE ORAL 3 TIMES DAILY PRN
COMMUNITY
Start: 2021-12-28 | End: 2022-02-17

## 2022-01-03 ASSESSMENT — ENCOUNTER SYMPTOMS
FATIGUE: 0
CONFUSION: 0
SHORTNESS OF BREATH: 0
HALLUCINATIONS: 0
SLEEP DISTURBANCE: 0
CHEST TIGHTNESS: 0
NERVOUS/ANXIOUS: 0
VOMITING: 0
BACK PAIN: 0
SORE THROAT: 0
SINUS PAIN: 0
AGITATION: 0
NAUSEA: 0
PHOTOPHOBIA: 0
FEVER: 0
CHILLS: 0
EYE PAIN: 0
ABDOMINAL PAIN: 0
SINUS PRESSURE: 0

## 2022-01-11 ENCOUNTER — TELEPHONE (OUTPATIENT)
Dept: BEHAVIORAL HEALTH | Age: 42
End: 2022-01-11

## 2022-01-11 ENCOUNTER — APPOINTMENT (OUTPATIENT)
Dept: INTERNAL MEDICINE | Age: 42
End: 2022-01-11

## 2022-01-21 ENCOUNTER — EXTERNAL RECORD (OUTPATIENT)
Dept: HEALTH INFORMATION MANAGEMENT | Facility: OTHER | Age: 42
End: 2022-01-21

## 2022-01-25 ENCOUNTER — TELEPHONE (OUTPATIENT)
Dept: SCHEDULING | Age: 42
End: 2022-01-25

## 2022-01-28 ENCOUNTER — HOSPITAL ENCOUNTER (EMERGENCY)
Age: 42
Discharge: HOME | End: 2022-01-29
Attending: EMERGENCY MEDICINE

## 2022-01-28 DIAGNOSIS — F10.920 ALCOHOLIC INTOXICATION WITHOUT COMPLICATION (CMD): Primary | ICD-10-CM

## 2022-01-28 LAB
ETHANOL SERPL-MCNC: 307 MG/DL
HCG SERPL QL: NEGATIVE

## 2022-01-28 PROCEDURE — 82077 ASSAY SPEC XCP UR&BREATH IA: CPT | Performed by: EMERGENCY MEDICINE

## 2022-01-28 PROCEDURE — 84703 CHORIONIC GONADOTROPIN ASSAY: CPT | Performed by: EMERGENCY MEDICINE

## 2022-01-28 PROCEDURE — 99284 EMERGENCY DEPT VISIT MOD MDM: CPT

## 2022-01-28 PROCEDURE — 10002800 HB RX 250 W HCPCS: Performed by: STUDENT IN AN ORGANIZED HEALTH CARE EDUCATION/TRAINING PROGRAM

## 2022-01-28 PROCEDURE — 96372 THER/PROPH/DIAG INJ SC/IM: CPT

## 2022-01-28 PROCEDURE — 99284 EMERGENCY DEPT VISIT MOD MDM: CPT | Performed by: STUDENT IN AN ORGANIZED HEALTH CARE EDUCATION/TRAINING PROGRAM

## 2022-01-28 RX ORDER — DROPERIDOL 2.5 MG/ML
2.5 INJECTION, SOLUTION INTRAMUSCULAR; INTRAVENOUS ONCE
Status: COMPLETED | OUTPATIENT
Start: 2022-01-28 | End: 2022-01-28

## 2022-01-28 RX ADMIN — DROPERIDOL 2.5 MG: 2.5 INJECTION, SOLUTION INTRAMUSCULAR; INTRAVENOUS at 20:43

## 2022-01-28 RX ADMIN — DROPERIDOL 2.5 MG: 2.5 INJECTION, SOLUTION INTRAMUSCULAR; INTRAVENOUS at 20:31

## 2022-01-28 ASSESSMENT — PAIN SCALES - WONG BAKER: WONGBAKER_NUMERICALRESPONSE: 0

## 2022-01-29 VITALS
HEART RATE: 86 BPM | OXYGEN SATURATION: 99 % | SYSTOLIC BLOOD PRESSURE: 146 MMHG | DIASTOLIC BLOOD PRESSURE: 89 MMHG | RESPIRATION RATE: 16 BRPM

## 2022-01-29 LAB
RAINBOW EXTRA TUBES HOLD SPECIMEN: NORMAL

## 2022-01-31 ENCOUNTER — APPOINTMENT (OUTPATIENT)
Dept: INTERNAL MEDICINE | Age: 42
End: 2022-01-31

## 2022-02-07 RX ORDER — TRAZODONE HYDROCHLORIDE 150 MG/1
TABLET ORAL
Qty: 30 TABLET | Refills: 0 | OUTPATIENT
Start: 2022-02-07

## 2022-02-10 ENCOUNTER — TELEPHONE (OUTPATIENT)
Dept: SCHEDULING | Age: 42
End: 2022-02-10

## 2022-02-14 ENCOUNTER — TELEPHONE (OUTPATIENT)
Dept: SCHEDULING | Age: 42
End: 2022-02-14

## 2022-02-17 ENCOUNTER — TELEPHONE (OUTPATIENT)
Dept: SCHEDULING | Age: 42
End: 2022-02-17

## 2022-02-17 ENCOUNTER — OFFICE VISIT (OUTPATIENT)
Dept: INTERNAL MEDICINE | Age: 42
End: 2022-02-17

## 2022-02-17 VITALS
SYSTOLIC BLOOD PRESSURE: 109 MMHG | HEART RATE: 60 BPM | DIASTOLIC BLOOD PRESSURE: 58 MMHG | RESPIRATION RATE: 19 BRPM | TEMPERATURE: 98.4 F | OXYGEN SATURATION: 97 % | WEIGHT: 168 LBS | HEIGHT: 66 IN | BODY MASS INDEX: 27 KG/M2

## 2022-02-17 DIAGNOSIS — G62.9 NEUROPATHY: ICD-10-CM

## 2022-02-17 DIAGNOSIS — I10 BENIGN ESSENTIAL HTN: ICD-10-CM

## 2022-02-17 DIAGNOSIS — M62.838 CERVICAL PARASPINAL MUSCLE SPASM: ICD-10-CM

## 2022-02-17 DIAGNOSIS — Z76.0 MEDICATION REFILL: ICD-10-CM

## 2022-02-17 DIAGNOSIS — F33.41 RECURRENT MAJOR DEPRESSIVE DISORDER, IN PARTIAL REMISSION (CMD): ICD-10-CM

## 2022-02-17 DIAGNOSIS — S42.491S OTHER CLOSED DISPLACED FRACTURE OF DISTAL END OF RIGHT HUMERUS, SEQUELA: Primary | ICD-10-CM

## 2022-02-17 DIAGNOSIS — F10.20 ALCOHOL USE DISORDER, SEVERE, DEPENDENCE (CMD): ICD-10-CM

## 2022-02-17 DIAGNOSIS — M80.021S: ICD-10-CM

## 2022-02-17 PROCEDURE — 3078F DIAST BP <80 MM HG: CPT | Performed by: INTERNAL MEDICINE

## 2022-02-17 PROCEDURE — 99214 OFFICE O/P EST MOD 30 MIN: CPT | Performed by: INTERNAL MEDICINE

## 2022-02-17 PROCEDURE — 3074F SYST BP LT 130 MM HG: CPT | Performed by: INTERNAL MEDICINE

## 2022-02-17 RX ORDER — BACLOFEN 10 MG/1
10 TABLET ORAL 2 TIMES DAILY
Qty: 60 TABLET | Refills: 3 | Status: SHIPPED | OUTPATIENT
Start: 2022-02-17 | End: 2022-03-03 | Stop reason: SDUPTHER

## 2022-02-17 RX ORDER — TRAZODONE HYDROCHLORIDE 150 MG/1
150 TABLET ORAL NIGHTLY
Qty: 30 TABLET | Refills: 2 | Status: SHIPPED | OUTPATIENT
Start: 2022-02-17 | End: 2022-05-05 | Stop reason: SDUPTHER

## 2022-02-17 RX ORDER — AMLODIPINE BESYLATE 2.5 MG/1
2.5 TABLET ORAL DAILY
Qty: 30 TABLET | Refills: 5 | Status: SHIPPED | OUTPATIENT
Start: 2022-02-17 | End: 2022-06-20

## 2022-02-17 RX ORDER — SERTRALINE HYDROCHLORIDE 150 MG/1
150 CAPSULE ORAL EVERY MORNING
Qty: 30 CAPSULE | Refills: 2 | Status: SHIPPED | OUTPATIENT
Start: 2022-02-17 | End: 2022-05-05 | Stop reason: SDUPTHER

## 2022-02-17 RX ORDER — GABAPENTIN 300 MG/1
300 CAPSULE ORAL 3 TIMES DAILY
Qty: 90 CAPSULE | Refills: 3 | Status: SHIPPED | OUTPATIENT
Start: 2022-02-17 | End: 2022-06-20

## 2022-02-17 ASSESSMENT — PATIENT HEALTH QUESTIONNAIRE - PHQ9
SUM OF ALL RESPONSES TO PHQ9 QUESTIONS 1 AND 2: 0
SUM OF ALL RESPONSES TO PHQ9 QUESTIONS 1 AND 2: 0
CLINICAL INTERPRETATION OF PHQ2 SCORE: NO FURTHER SCREENING NEEDED
2. FEELING DOWN, DEPRESSED OR HOPELESS: NOT AT ALL
1. LITTLE INTEREST OR PLEASURE IN DOING THINGS: NOT AT ALL

## 2022-02-17 ASSESSMENT — PAIN SCALES - GENERAL: PAINLEVEL: 2

## 2022-02-18 ENCOUNTER — TELEPHONE (OUTPATIENT)
Dept: SCHEDULING | Age: 42
End: 2022-02-18

## 2022-02-18 ENCOUNTER — APPOINTMENT (OUTPATIENT)
Dept: INTERNAL MEDICINE | Age: 42
End: 2022-02-18

## 2022-02-19 ENCOUNTER — TELEPHONE (OUTPATIENT)
Dept: INTERNAL MEDICINE | Age: 42
End: 2022-02-19

## 2022-02-26 ENCOUNTER — HOSPITAL ENCOUNTER (OUTPATIENT)
Dept: MAMMOGRAPHY | Age: 42
Discharge: HOME | End: 2022-02-26
Attending: INTERNAL MEDICINE

## 2022-02-26 DIAGNOSIS — M80.021S: ICD-10-CM

## 2022-02-26 PROCEDURE — 77080 DXA BONE DENSITY AXIAL: CPT

## 2022-03-02 ENCOUNTER — TELEPHONE (OUTPATIENT)
Dept: SCHEDULING | Age: 42
End: 2022-03-02

## 2022-03-03 ENCOUNTER — LAB SERVICES (OUTPATIENT)
Dept: LAB | Age: 42
End: 2022-03-03

## 2022-03-03 ENCOUNTER — OFFICE VISIT (OUTPATIENT)
Dept: INTERNAL MEDICINE | Age: 42
End: 2022-03-03

## 2022-03-03 VITALS
HEIGHT: 66 IN | WEIGHT: 172 LBS | DIASTOLIC BLOOD PRESSURE: 76 MMHG | HEART RATE: 77 BPM | TEMPERATURE: 98.1 F | SYSTOLIC BLOOD PRESSURE: 106 MMHG | RESPIRATION RATE: 17 BRPM | BODY MASS INDEX: 27.64 KG/M2 | OXYGEN SATURATION: 98 %

## 2022-03-03 DIAGNOSIS — Z01.818 PRE-OP EVALUATION: Primary | ICD-10-CM

## 2022-03-03 DIAGNOSIS — F32.5 MAJOR DEPRESSIVE DISORDER WITH SINGLE EPISODE, IN FULL REMISSION (CMD): ICD-10-CM

## 2022-03-03 DIAGNOSIS — I10 ESSENTIAL (PRIMARY) HYPERTENSION: ICD-10-CM

## 2022-03-03 DIAGNOSIS — R56.9 SEIZURES (CMD): ICD-10-CM

## 2022-03-03 DIAGNOSIS — G24.3 TORTICOLLIS, SPASMODIC: ICD-10-CM

## 2022-03-03 DIAGNOSIS — F10.20 ALCOHOL USE DISORDER, SEVERE, DEPENDENCE (CMD): ICD-10-CM

## 2022-03-03 DIAGNOSIS — S42.201D CLOSED FRACTURE OF PROXIMAL END OF RIGHT HUMERUS WITH ROUTINE HEALING, UNSPECIFIED FRACTURE MORPHOLOGY, SUBSEQUENT ENCOUNTER: ICD-10-CM

## 2022-03-03 PROBLEM — M62.838 CERVICAL PARASPINAL MUSCLE SPASM: Status: ACTIVE | Noted: 2022-03-03

## 2022-03-03 LAB
BASOPHILS # BLD: 0.1 K/MCL (ref 0–0.3)
BASOPHILS NFR BLD: 1 %
DEPRECATED RDW RBC: 46.1 FL (ref 39–50)
EOSINOPHIL # BLD: 0.3 K/MCL (ref 0–0.5)
EOSINOPHIL NFR BLD: 2 %
ERYTHROCYTE [DISTWIDTH] IN BLOOD: 13.2 % (ref 11–15)
HCT VFR BLD CALC: 40 % (ref 36–46.5)
HGB BLD-MCNC: 13.5 G/DL (ref 12–15.5)
IMM GRANULOCYTES # BLD AUTO: 0 K/MCL (ref 0–0.2)
IMM GRANULOCYTES # BLD: 0 %
LYMPHOCYTES # BLD: 3 K/MCL (ref 1–4.8)
LYMPHOCYTES NFR BLD: 27 %
MCH RBC QN AUTO: 31.9 PG (ref 26–34)
MCHC RBC AUTO-ENTMCNC: 33.8 G/DL (ref 32–36.5)
MCV RBC AUTO: 94.6 FL (ref 78–100)
MONOCYTES # BLD: 0.7 K/MCL (ref 0.3–0.9)
MONOCYTES NFR BLD: 6 %
NEUTROPHILS # BLD: 7.3 K/MCL (ref 1.8–7.7)
NEUTROPHILS NFR BLD: 64 %
NRBC BLD MANUAL-RTO: 0 /100 WBC
PLATELET # BLD AUTO: 276 K/MCL (ref 140–450)
RBC # BLD: 4.23 MIL/MCL (ref 4–5.2)
WBC # BLD: 11.4 K/MCL (ref 4.2–11)

## 2022-03-03 PROCEDURE — 85025 COMPLETE CBC W/AUTO DIFF WBC: CPT | Performed by: PSYCHIATRY & NEUROLOGY

## 2022-03-03 PROCEDURE — 3078F DIAST BP <80 MM HG: CPT | Performed by: INTERNAL MEDICINE

## 2022-03-03 PROCEDURE — 80048 BASIC METABOLIC PNL TOTAL CA: CPT | Performed by: PSYCHIATRY & NEUROLOGY

## 2022-03-03 PROCEDURE — 85610 PROTHROMBIN TIME: CPT | Performed by: PSYCHIATRY & NEUROLOGY

## 2022-03-03 PROCEDURE — 36415 COLL VENOUS BLD VENIPUNCTURE: CPT | Performed by: PSYCHIATRY & NEUROLOGY

## 2022-03-03 PROCEDURE — 99214 OFFICE O/P EST MOD 30 MIN: CPT | Performed by: INTERNAL MEDICINE

## 2022-03-03 PROCEDURE — 3074F SYST BP LT 130 MM HG: CPT | Performed by: INTERNAL MEDICINE

## 2022-03-03 RX ORDER — BACLOFEN 10 MG/1
10 TABLET ORAL 3 TIMES DAILY
Qty: 90 TABLET | Refills: 5 | Status: SHIPPED | OUTPATIENT
Start: 2022-03-03 | End: 2022-04-27 | Stop reason: SDUPTHER

## 2022-03-03 ASSESSMENT — ENCOUNTER SYMPTOMS
HEADACHES: 0
FATIGUE: 0
SHORTNESS OF BREATH: 0
SORE THROAT: 0
BLOOD IN STOOL: 0
SEIZURES: 1
UNEXPECTED WEIGHT CHANGE: 0
DIZZINESS: 0
BACK PAIN: 0
COUGH: 0
WEAKNESS: 0
SINUS PRESSURE: 0
BRUISES/BLEEDS EASILY: 0
ABDOMINAL PAIN: 0
ACTIVITY CHANGE: 0
NUMBNESS: 0
NERVOUS/ANXIOUS: 0

## 2022-03-03 ASSESSMENT — PATIENT HEALTH QUESTIONNAIRE - PHQ9
SUM OF ALL RESPONSES TO PHQ9 QUESTIONS 1 AND 2: 0
SUM OF ALL RESPONSES TO PHQ9 QUESTIONS 1 AND 2: 0
2. FEELING DOWN, DEPRESSED OR HOPELESS: NOT AT ALL
CLINICAL INTERPRETATION OF PHQ2 SCORE: NO FURTHER SCREENING NEEDED
1. LITTLE INTEREST OR PLEASURE IN DOING THINGS: NOT AT ALL

## 2022-03-04 ENCOUNTER — TELEPHONE (OUTPATIENT)
Dept: SCHEDULING | Age: 42
End: 2022-03-04

## 2022-03-04 DIAGNOSIS — Z01.818 PRE-OP EVALUATION: ICD-10-CM

## 2022-03-04 LAB
ANION GAP SERPL CALC-SCNC: 12 MMOL/L (ref 10–20)
BUN SERPL-MCNC: 11 MG/DL (ref 6–20)
BUN/CREAT SERPL: 15 (ref 7–25)
CALCIUM SERPL-MCNC: 9.1 MG/DL (ref 8.4–10.2)
CHLORIDE SERPL-SCNC: 105 MMOL/L (ref 98–107)
CO2 SERPL-SCNC: 25 MMOL/L (ref 21–32)
CREAT SERPL-MCNC: 0.72 MG/DL (ref 0.51–0.95)
FASTING DURATION TIME PATIENT: 6 HOURS (ref 0–999)
GFR SERPLBLD BASED ON 1.73 SQ M-ARVRAT: >90 ML/MIN
GLUCOSE SERPL-MCNC: 90 MG/DL (ref 70–99)
INR PPP: 1
POTASSIUM SERPL-SCNC: 4 MMOL/L (ref 3.4–5.1)
PROTHROMBIN TIME: 10.4 SEC (ref 9.7–11.8)
SODIUM SERPL-SCNC: 138 MMOL/L (ref 135–145)

## 2022-03-04 PROCEDURE — 93000 ELECTROCARDIOGRAM COMPLETE: CPT | Performed by: INTERNAL MEDICINE

## 2022-03-09 ENCOUNTER — EXTERNAL RECORD (OUTPATIENT)
Dept: HEALTH INFORMATION MANAGEMENT | Facility: OTHER | Age: 42
End: 2022-03-09

## 2022-03-17 ENCOUNTER — TELEPHONIC VISIT (OUTPATIENT)
Dept: BEHAVIORAL HEALTH | Age: 42
End: 2022-03-17
Attending: PSYCHIATRY & NEUROLOGY

## 2022-03-17 ENCOUNTER — LAB SERVICES (OUTPATIENT)
Dept: LAB | Age: 42
End: 2022-03-17

## 2022-03-17 ENCOUNTER — OFFICE VISIT (OUTPATIENT)
Dept: OBGYN | Age: 42
End: 2022-03-17

## 2022-03-17 VITALS
DIASTOLIC BLOOD PRESSURE: 65 MMHG | SYSTOLIC BLOOD PRESSURE: 116 MMHG | HEART RATE: 64 BPM | RESPIRATION RATE: 18 BRPM | TEMPERATURE: 97.2 F | WEIGHT: 171.96 LBS | HEIGHT: 66 IN | BODY MASS INDEX: 27.64 KG/M2

## 2022-03-17 DIAGNOSIS — Z11.3 ROUTINE SCREENING FOR STI (SEXUALLY TRANSMITTED INFECTION): Primary | ICD-10-CM

## 2022-03-17 DIAGNOSIS — F32.9 MAJOR DEPRESSIVE DISORDER WITH CURRENT ACTIVE EPISODE, UNSPECIFIED DEPRESSION EPISODE SEVERITY, UNSPECIFIED WHETHER RECURRENT: ICD-10-CM

## 2022-03-17 DIAGNOSIS — Z11.3 ROUTINE SCREENING FOR STI (SEXUALLY TRANSMITTED INFECTION): ICD-10-CM

## 2022-03-17 DIAGNOSIS — F10.21 ALCOHOL DEPENDENCE IN REMISSION (CMD): ICD-10-CM

## 2022-03-17 DIAGNOSIS — F41.9 ANXIETY DISORDER, UNSPECIFIED TYPE: Primary | ICD-10-CM

## 2022-03-17 PROCEDURE — 87340 HEPATITIS B SURFACE AG IA: CPT | Performed by: PSYCHIATRY & NEUROLOGY

## 2022-03-17 PROCEDURE — 86592 SYPHILIS TEST NON-TREP QUAL: CPT | Performed by: PSYCHIATRY & NEUROLOGY

## 2022-03-17 PROCEDURE — 3078F DIAST BP <80 MM HG: CPT | Performed by: OBSTETRICS & GYNECOLOGY

## 2022-03-17 PROCEDURE — 99213 OFFICE O/P EST LOW 20 MIN: CPT | Performed by: OBSTETRICS & GYNECOLOGY

## 2022-03-17 PROCEDURE — 36415 COLL VENOUS BLD VENIPUNCTURE: CPT | Performed by: PSYCHIATRY & NEUROLOGY

## 2022-03-17 PROCEDURE — H2000 COMP MULTIDISIPLN EVALUATION: HCPCS

## 2022-03-17 PROCEDURE — 86803 HEPATITIS C AB TEST: CPT | Performed by: PSYCHIATRY & NEUROLOGY

## 2022-03-17 PROCEDURE — 3074F SYST BP LT 130 MM HG: CPT | Performed by: OBSTETRICS & GYNECOLOGY

## 2022-03-17 PROCEDURE — 87389 HIV-1 AG W/HIV-1&-2 AB AG IA: CPT | Performed by: PSYCHIATRY & NEUROLOGY

## 2022-03-17 RX ORDER — NORETHINDRONE ACETATE AND ETHINYL ESTRADIOL .03; 1.5 MG/1; MG/1
1 TABLET ORAL DAILY
Qty: 90 TABLET | Refills: 3 | Status: SHIPPED | OUTPATIENT
Start: 2022-03-17 | End: 2023-01-10 | Stop reason: CLARIF

## 2022-03-17 ASSESSMENT — ANXIETY QUESTIONNAIRES
5. BEING SO RESTLESS THAT IT IS HARD TO SIT STILL: SEVERAL DAYS
2. NOT BEING ABLE TO STOP OR CONTROL WORRYING: SEVERAL DAYS
1. FEELING NERVOUS, ANXIOUS, OR ON EDGE: 1
2. NOT BEING ABLE TO STOP OR CONTROL WORRYING: 1
6. BECOMING EASILY ANNOYED OR IRRITABLE: 1
7. FEELING AFRAID AS IF SOMETHING AWFUL MIGHT HAPPEN: SEVERAL DAYS
5. BEING SO RESTLESS THAT IT IS HARD TO SIT STILL: 1
7. FEELING AFRAID AS IF SOMETHING AWFUL MIGHT HAPPEN: 1
IF YOU CHECKED OFF ANY PROBLEMS ON THIS QUESTIONNAIRE, HOW DIFFICULT HAVE THESE PROBLEMS MADE IT FOR YOU TO DO YOUR WORK, TAKE CARE OF THINGS AT HOME, OR GET ALONG WITH OTHER PEOPLE: SOMEWHAT DIFFICULT
3. WORRYING TOO MUCH ABOUT DIFFERENT THINGS: SEVERAL DAYS
4. TROUBLE RELAXING: SEVERAL DAYS
3. WORRYING TOO MUCH ABOUT DIFFERENT THINGS: 1
1. FEELING NERVOUS, ANXIOUS, OR ON EDGE: SEVERAL DAYS
4. TROUBLE RELAXING: 1
6. BECOMING EASILY ANNOYED OR IRRITABLE: SEVERAL DAYS
GAD7 TOTAL SCORE: 7

## 2022-03-17 ASSESSMENT — PATIENT HEALTH QUESTIONNAIRE - PHQ9
CLINICAL INTERPRETATION OF PHQ2 SCORE: NO FURTHER SCREENING NEEDED
2. FEELING DOWN, DEPRESSED OR HOPELESS: NOT AT ALL
1. LITTLE INTEREST OR PLEASURE IN DOING THINGS: NOT AT ALL
SUM OF ALL RESPONSES TO PHQ9 QUESTIONS 1 AND 2: 0
SUM OF ALL RESPONSES TO PHQ9 QUESTIONS 1 AND 2: 0

## 2022-03-17 ASSESSMENT — PAIN SCALES - GENERAL: PAINLEVEL: 4

## 2022-03-18 LAB
HBV SURFACE AG SER QL: NEGATIVE
HCV AB SER QL: NEGATIVE
HIV 1+2 AB+HIV1 P24 AG SERPL QL IA: NONREACTIVE
RPR SER QL: NONREACTIVE

## 2022-03-23 ENCOUNTER — TELEPHONE (OUTPATIENT)
Dept: BEHAVIORAL HEALTH | Age: 42
End: 2022-03-23

## 2022-03-24 ENCOUNTER — NURSE ONLY (OUTPATIENT)
Dept: OBGYN | Age: 42
End: 2022-03-24

## 2022-03-24 VITALS
DIASTOLIC BLOOD PRESSURE: 80 MMHG | TEMPERATURE: 96.9 F | SYSTOLIC BLOOD PRESSURE: 120 MMHG | OXYGEN SATURATION: 97 % | BODY MASS INDEX: 27.58 KG/M2 | RESPIRATION RATE: 17 BRPM | HEIGHT: 66 IN | HEART RATE: 72 BPM | WEIGHT: 171.6 LBS

## 2022-03-24 DIAGNOSIS — Z23 NEED FOR HPV VACCINATION: ICD-10-CM

## 2022-03-24 DIAGNOSIS — Z00.00 ANNUAL PHYSICAL EXAM: Primary | ICD-10-CM

## 2022-03-24 PROCEDURE — 87491 CHLMYD TRACH DNA AMP PROBE: CPT | Performed by: INTERNAL MEDICINE

## 2022-03-24 PROCEDURE — 90651 9VHPV VACCINE 2/3 DOSE IM: CPT | Performed by: OBSTETRICS & GYNECOLOGY

## 2022-03-24 PROCEDURE — 87591 N.GONORRHOEAE DNA AMP PROB: CPT | Performed by: INTERNAL MEDICINE

## 2022-03-24 RX ORDER — HYDROXYZINE HYDROCHLORIDE 25 MG/1
25 TABLET, FILM COATED ORAL 3 TIMES DAILY PRN
COMMUNITY
Start: 2022-03-09 | End: 2023-01-10 | Stop reason: CLARIF

## 2022-03-24 ASSESSMENT — PAIN SCALES - GENERAL: PAINLEVEL: 4

## 2022-03-25 LAB
C TRACH RRNA UR QL NAA+PROBE: NEGATIVE
Lab: NORMAL
N GONORRHOEA RRNA UR QL NAA+PROBE: NEGATIVE

## 2022-03-30 ENCOUNTER — APPOINTMENT (OUTPATIENT)
Dept: BEHAVIORAL HEALTH | Age: 42
End: 2022-03-30
Attending: PSYCHIATRY & NEUROLOGY

## 2022-03-30 ENCOUNTER — TELEPHONE (OUTPATIENT)
Dept: BEHAVIORAL HEALTH | Age: 42
End: 2022-03-30

## 2022-04-06 ENCOUNTER — BEHAVIORAL HEALTH (OUTPATIENT)
Dept: BEHAVIORAL HEALTH | Age: 42
End: 2022-04-06
Attending: PSYCHIATRY & NEUROLOGY

## 2022-04-06 DIAGNOSIS — F41.9 ANXIETY DISORDER, UNSPECIFIED TYPE: Primary | ICD-10-CM

## 2022-04-06 PROCEDURE — 90834 PSYTX W PT 45 MINUTES: CPT

## 2022-04-06 PROCEDURE — H2015 COMP COMM SUPP SVC, 15 MIN: HCPCS

## 2022-04-13 ENCOUNTER — BEHAVIORAL HEALTH (OUTPATIENT)
Dept: BEHAVIORAL HEALTH | Age: 42
End: 2022-04-13
Attending: PSYCHIATRY & NEUROLOGY

## 2022-04-13 DIAGNOSIS — F41.9 ANXIETY DISORDER, UNSPECIFIED TYPE: Primary | ICD-10-CM

## 2022-04-13 PROCEDURE — 90834 PSYTX W PT 45 MINUTES: CPT

## 2022-04-20 ENCOUNTER — BEHAVIORAL HEALTH (OUTPATIENT)
Dept: BEHAVIORAL HEALTH | Age: 42
End: 2022-04-20
Attending: PSYCHIATRY & NEUROLOGY

## 2022-04-20 DIAGNOSIS — F41.9 ANXIETY DISORDER, UNSPECIFIED TYPE: Primary | ICD-10-CM

## 2022-04-20 PROCEDURE — H2015 COMP COMM SUPP SVC, 15 MIN: HCPCS

## 2022-04-20 PROCEDURE — 90834 PSYTX W PT 45 MINUTES: CPT

## 2022-04-27 ENCOUNTER — TELEPHONE (OUTPATIENT)
Dept: SCHEDULING | Age: 42
End: 2022-04-27

## 2022-04-27 ENCOUNTER — BEHAVIORAL HEALTH (OUTPATIENT)
Dept: BEHAVIORAL HEALTH | Age: 42
End: 2022-04-27
Attending: PSYCHIATRY & NEUROLOGY

## 2022-04-27 DIAGNOSIS — F41.9 ANXIETY DISORDER, UNSPECIFIED TYPE: Primary | ICD-10-CM

## 2022-04-27 PROCEDURE — H2015 COMP COMM SUPP SVC, 15 MIN: HCPCS

## 2022-04-27 RX ORDER — BACLOFEN 10 MG/1
10 TABLET ORAL 4 TIMES DAILY
Qty: 120 TABLET | Refills: 0 | Status: SHIPPED | OUTPATIENT
Start: 2022-04-27 | End: 2022-05-03 | Stop reason: SDUPTHER

## 2022-05-03 RX ORDER — BACLOFEN 20 MG/1
20 TABLET ORAL 4 TIMES DAILY
Qty: 120 TABLET | Refills: 3 | Status: SHIPPED | OUTPATIENT
Start: 2022-05-03 | End: 2023-01-09

## 2022-05-11 ENCOUNTER — APPOINTMENT (OUTPATIENT)
Dept: BEHAVIORAL HEALTH | Age: 42
End: 2022-05-11
Attending: PSYCHIATRY & NEUROLOGY

## 2022-05-11 ENCOUNTER — TELEPHONE (OUTPATIENT)
Dept: BEHAVIORAL HEALTH | Age: 42
End: 2022-05-11

## 2022-05-18 ENCOUNTER — APPOINTMENT (OUTPATIENT)
Dept: BEHAVIORAL HEALTH | Age: 42
End: 2022-05-18
Attending: PSYCHIATRY & NEUROLOGY

## 2022-05-25 ENCOUNTER — TELEPHONE (OUTPATIENT)
Dept: BEHAVIORAL HEALTH | Age: 42
End: 2022-05-25

## 2022-05-25 ENCOUNTER — APPOINTMENT (OUTPATIENT)
Dept: BEHAVIORAL HEALTH | Age: 42
End: 2022-05-25
Attending: PSYCHIATRY & NEUROLOGY

## 2022-05-29 ENCOUNTER — HOSPITAL ENCOUNTER (EMERGENCY)
Age: 42
Discharge: HOME | End: 2022-05-29
Attending: EMERGENCY MEDICINE

## 2022-05-29 VITALS
RESPIRATION RATE: 15 BRPM | TEMPERATURE: 98 F | OXYGEN SATURATION: 98 % | DIASTOLIC BLOOD PRESSURE: 89 MMHG | HEART RATE: 98 BPM | SYSTOLIC BLOOD PRESSURE: 129 MMHG

## 2022-05-29 DIAGNOSIS — F10.10 ALCOHOL ABUSE: Primary | ICD-10-CM

## 2022-05-29 DIAGNOSIS — Z86.59 HISTORY OF ANXIETY: ICD-10-CM

## 2022-05-29 PROCEDURE — 99284 EMERGENCY DEPT VISIT MOD MDM: CPT | Performed by: EMERGENCY MEDICINE

## 2022-05-29 PROCEDURE — 99284 EMERGENCY DEPT VISIT MOD MDM: CPT

## 2022-05-29 ASSESSMENT — PAIN SCALES - GENERAL: PAINLEVEL_OUTOF10: 0

## 2022-05-29 ASSESSMENT — ENCOUNTER SYMPTOMS
NEUROLOGICAL NEGATIVE: 1
CONSTITUTIONAL NEGATIVE: 1
RESPIRATORY NEGATIVE: 1
EYES NEGATIVE: 1
PSYCHIATRIC NEGATIVE: 1
ALLERGIC/IMMUNOLOGIC NEGATIVE: 1
HEMATOLOGIC/LYMPHATIC NEGATIVE: 1
ENDOCRINE NEGATIVE: 1
GASTROINTESTINAL NEGATIVE: 1

## 2022-05-30 LAB
RAINBOW EXTRA TUBES HOLD SPECIMEN: NORMAL
RAINBOW EXTRA TUBES HOLD SPECIMEN: NORMAL

## 2022-06-01 ENCOUNTER — TELEPHONE (OUTPATIENT)
Dept: BEHAVIORAL HEALTH | Age: 42
End: 2022-06-01

## 2022-06-01 ENCOUNTER — BEHAVIORAL HEALTH (OUTPATIENT)
Dept: BEHAVIORAL HEALTH | Age: 42
End: 2022-06-01
Attending: PSYCHIATRY & NEUROLOGY

## 2022-06-01 DIAGNOSIS — F41.9 ANXIETY DISORDER, UNSPECIFIED TYPE: Primary | ICD-10-CM

## 2022-06-03 ENCOUNTER — HOSPITAL ENCOUNTER (EMERGENCY)
Age: 42
Discharge: HOME | End: 2022-06-04
Attending: EMERGENCY MEDICINE

## 2022-06-03 DIAGNOSIS — F10.920 ALCOHOLIC INTOXICATION WITHOUT COMPLICATION (CMD): Primary | ICD-10-CM

## 2022-06-03 LAB
ALBUMIN SERPL-MCNC: 4.2 G/DL (ref 3.6–5.1)
ALBUMIN/GLOB SERPL: 1.1 {RATIO} (ref 1–2.4)
ALP SERPL-CCNC: 100 UNITS/L (ref 45–117)
ALT SERPL-CCNC: 103 UNITS/L
ANION GAP SERPL CALC-SCNC: 23 MMOL/L (ref 7–19)
APAP SERPL-MCNC: <2 MCG/ML (ref 10–30)
AST SERPL-CCNC: 94 UNITS/L
BASOPHILS # BLD: 0.1 K/MCL (ref 0–0.3)
BASOPHILS NFR BLD: 1 %
BILIRUB SERPL-MCNC: 0.5 MG/DL (ref 0.2–1)
BUN SERPL-MCNC: 10 MG/DL (ref 6–20)
BUN/CREAT SERPL: 20 (ref 7–25)
CALCIUM SERPL-MCNC: 8.5 MG/DL (ref 8.4–10.2)
CHLORIDE SERPL-SCNC: 95 MMOL/L (ref 97–110)
CO2 SERPL-SCNC: 23 MMOL/L (ref 21–32)
CREAT SERPL-MCNC: 0.51 MG/DL (ref 0.51–0.95)
DEPRECATED RDW RBC: 47 FL (ref 39–50)
EOSINOPHIL # BLD: 0 K/MCL (ref 0–0.5)
EOSINOPHIL NFR BLD: 0 %
ERYTHROCYTE [DISTWIDTH] IN BLOOD: 14.6 % (ref 11–15)
ETHANOL SERPL-MCNC: 418 MG/DL
FASTING DURATION TIME PATIENT: ABNORMAL H
GFR SERPLBLD BASED ON 1.73 SQ M-ARVRAT: >90 ML/MIN
GLOBULIN SER-MCNC: 4 G/DL (ref 2–4)
GLUCOSE SERPL-MCNC: 116 MG/DL (ref 70–99)
HCG SERPL-ACNC: <2 MUNITS/ML
HCT VFR BLD CALC: 41.3 % (ref 36–46.5)
HGB BLD-MCNC: 14.5 G/DL (ref 12–15.5)
IMM GRANULOCYTES # BLD AUTO: 0.1 K/MCL (ref 0–0.2)
IMM GRANULOCYTES # BLD: 1 %
LYMPHOCYTES # BLD: 1.1 K/MCL (ref 1–4.8)
LYMPHOCYTES NFR BLD: 6 %
MCH RBC QN AUTO: 30.7 PG (ref 26–34)
MCHC RBC AUTO-ENTMCNC: 35.1 G/DL (ref 32–36.5)
MCV RBC AUTO: 87.3 FL (ref 78–100)
MONOCYTES # BLD: 0.3 K/MCL (ref 0.3–0.9)
MONOCYTES NFR BLD: 2 %
NEUTROPHILS # BLD: 16 K/MCL (ref 1.8–7.7)
NEUTROPHILS NFR BLD: 90 %
NRBC BLD MANUAL-RTO: 0 /100 WBC
PLATELET # BLD AUTO: 193 K/MCL (ref 140–450)
POTASSIUM SERPL-SCNC: 3.9 MMOL/L (ref 3.4–5.1)
PROT SERPL-MCNC: 8.2 G/DL (ref 6.4–8.2)
RAINBOW EXTRA TUBES HOLD SPECIMEN: NORMAL
RBC # BLD: 4.73 MIL/MCL (ref 4–5.2)
SALICYLATES SERPL-MCNC: <2.8 MG/DL
SODIUM SERPL-SCNC: 137 MMOL/L (ref 135–145)
WBC # BLD: 17.6 K/MCL (ref 4.2–11)

## 2022-06-03 PROCEDURE — 10002800 HB RX 250 W HCPCS: Performed by: EMERGENCY MEDICINE

## 2022-06-03 PROCEDURE — 96361 HYDRATE IV INFUSION ADD-ON: CPT

## 2022-06-03 PROCEDURE — 82077 ASSAY SPEC XCP UR&BREATH IA: CPT | Performed by: EMERGENCY MEDICINE

## 2022-06-03 PROCEDURE — 96376 TX/PRO/DX INJ SAME DRUG ADON: CPT

## 2022-06-03 PROCEDURE — 84702 CHORIONIC GONADOTROPIN TEST: CPT | Performed by: EMERGENCY MEDICINE

## 2022-06-03 PROCEDURE — 10002803 HB RX 637: Performed by: EMERGENCY MEDICINE

## 2022-06-03 PROCEDURE — 80143 DRUG ASSAY ACETAMINOPHEN: CPT | Performed by: EMERGENCY MEDICINE

## 2022-06-03 PROCEDURE — 85025 COMPLETE CBC W/AUTO DIFF WBC: CPT | Performed by: EMERGENCY MEDICINE

## 2022-06-03 PROCEDURE — 96375 TX/PRO/DX INJ NEW DRUG ADDON: CPT

## 2022-06-03 PROCEDURE — 80053 COMPREHEN METABOLIC PANEL: CPT | Performed by: EMERGENCY MEDICINE

## 2022-06-03 PROCEDURE — 99284 EMERGENCY DEPT VISIT MOD MDM: CPT | Performed by: EMERGENCY MEDICINE

## 2022-06-03 PROCEDURE — 10002800 HB RX 250 W HCPCS

## 2022-06-03 PROCEDURE — 96374 THER/PROPH/DIAG INJ IV PUSH: CPT

## 2022-06-03 PROCEDURE — 80179 DRUG ASSAY SALICYLATE: CPT | Performed by: EMERGENCY MEDICINE

## 2022-06-03 PROCEDURE — 10002807 HB RX 258: Performed by: EMERGENCY MEDICINE

## 2022-06-03 PROCEDURE — 99284 EMERGENCY DEPT VISIT MOD MDM: CPT

## 2022-06-03 RX ORDER — LORAZEPAM 2 MG/ML
2 INJECTION INTRAMUSCULAR ONCE
Status: COMPLETED | OUTPATIENT
Start: 2022-06-03 | End: 2022-06-03

## 2022-06-03 RX ORDER — LORAZEPAM 2 MG/ML
INJECTION INTRAMUSCULAR
Status: COMPLETED
Start: 2022-06-03 | End: 2022-06-03

## 2022-06-03 RX ORDER — LEVETIRACETAM 500 MG/1
1000 TABLET ORAL ONCE
Status: COMPLETED | OUTPATIENT
Start: 2022-06-03 | End: 2022-06-03

## 2022-06-03 RX ADMIN — SODIUM CHLORIDE 1000 ML: 9 INJECTION, SOLUTION INTRAVENOUS at 12:35

## 2022-06-03 RX ADMIN — LEVETIRACETAM 1000 MG: 500 TABLET, FILM COATED ORAL at 22:35

## 2022-06-03 RX ADMIN — LORAZEPAM 2 MG: 2 INJECTION INTRAMUSCULAR; INTRAVENOUS at 22:35

## 2022-06-03 RX ADMIN — LORAZEPAM 2 MG: 2 INJECTION INTRAMUSCULAR; INTRAVENOUS at 12:57

## 2022-06-03 RX ADMIN — NALOXONE HYDROCHLORIDE 1 MG: 1 INJECTION PARENTERAL at 12:00

## 2022-06-03 ASSESSMENT — LIFESTYLE VARIABLES
AUDITORY DISTURBANCES: NOT PRESENT
TACTILE DISTURBANCES: VERY MILD ITCHING, PINS AND NEEDLES BURNING OR NUMBNESS
NAUSEA AND VOMITING: INTERMITTENT NAUSEA WITH DRY HEAVES
ANXIETY: MILDLY ANXIOUS
AGITATION: NORMAL ACTIVITY
TREMOR: 3
VISUAL DISTURBANCES: NOT PRESENT
PAROXYSMAL SWEATS: NO SWEAT VISIBLE
HEADACHE, FULLNESS IN HEAD: NOT PRESENT

## 2022-06-04 ENCOUNTER — HOSPITAL ENCOUNTER (INPATIENT)
Age: 42
LOS: 1 days | Discharge: HOME | End: 2022-06-06
Attending: EMERGENCY MEDICINE | Admitting: INTERNAL MEDICINE

## 2022-06-04 VITALS
OXYGEN SATURATION: 100 % | DIASTOLIC BLOOD PRESSURE: 89 MMHG | TEMPERATURE: 98.8 F | RESPIRATION RATE: 15 BRPM | HEART RATE: 90 BPM | SYSTOLIC BLOOD PRESSURE: 138 MMHG

## 2022-06-04 DIAGNOSIS — F41.9 ANXIETY: ICD-10-CM

## 2022-06-04 DIAGNOSIS — F10.920 ALCOHOLIC INTOXICATION WITHOUT COMPLICATION (CMD): Primary | ICD-10-CM

## 2022-06-04 LAB
ALBUMIN SERPL-MCNC: 4.2 G/DL (ref 3.6–5.1)
ALBUMIN/GLOB SERPL: 1.1 {RATIO} (ref 1–2.4)
ALP SERPL-CCNC: 95 UNITS/L (ref 45–117)
ALT SERPL-CCNC: 94 UNITS/L
AMPHETAMINES UR QL SCN>500 NG/ML: NEGATIVE
ANION GAP SERPL CALC-SCNC: 26 MMOL/L (ref 7–19)
APPEARANCE UR: CLEAR
AST SERPL-CCNC: 89 UNITS/L
B-HCG UR QL: NEGATIVE
BACTERIA #/AREA URNS HPF: ABNORMAL /HPF
BARBITURATES UR QL SCN>200 NG/ML: NEGATIVE
BASOPHILS # BLD: 0.1 K/MCL (ref 0–0.3)
BASOPHILS NFR BLD: 0 %
BENZODIAZ UR QL SCN>200 NG/ML: NEGATIVE
BILIRUB SERPL-MCNC: 0.8 MG/DL (ref 0.2–1)
BILIRUB UR QL STRIP: NEGATIVE
BUN SERPL-MCNC: 6 MG/DL (ref 6–20)
BUN/CREAT SERPL: 13 (ref 7–25)
BZE UR QL SCN>150 NG/ML: NEGATIVE
CALCIUM SERPL-MCNC: 9.2 MG/DL (ref 8.4–10.2)
CANNABINOIDS UR QL SCN>50 NG/ML: NEGATIVE
CHLORIDE SERPL-SCNC: 93 MMOL/L (ref 97–110)
CO2 SERPL-SCNC: 17 MMOL/L (ref 21–32)
COLOR UR: COLORLESS
CREAT SERPL-MCNC: 0.48 MG/DL (ref 0.51–0.95)
DEPRECATED RDW RBC: 45.5 FL (ref 39–50)
EOSINOPHIL # BLD: 0 K/MCL (ref 0–0.5)
EOSINOPHIL NFR BLD: 0 %
ERYTHROCYTE [DISTWIDTH] IN BLOOD: 14.2 % (ref 11–15)
ETHANOL SERPL-MCNC: 247 MG/DL
FASTING DURATION TIME PATIENT: ABNORMAL H
GFR SERPLBLD BASED ON 1.73 SQ M-ARVRAT: >90 ML/MIN
GLOBULIN SER-MCNC: 4 G/DL (ref 2–4)
GLUCOSE SERPL-MCNC: 98 MG/DL (ref 70–99)
GLUCOSE UR STRIP-MCNC: NEGATIVE MG/DL
HCT VFR BLD CALC: 41.2 % (ref 36–46.5)
HGB BLD-MCNC: 14.4 G/DL (ref 12–15.5)
HGB UR QL STRIP: ABNORMAL
HYALINE CASTS #/AREA URNS LPF: ABNORMAL /LPF
IMM GRANULOCYTES # BLD AUTO: 0 K/MCL (ref 0–0.2)
IMM GRANULOCYTES # BLD: 0 %
INTERNAL PROCEDURAL CONTROLS ACCEPTABLE: YES
KETONES UR STRIP-MCNC: 15 MG/DL
LEUKOCYTE ESTERASE UR QL STRIP: NEGATIVE
LYMPHOCYTES # BLD: 1 K/MCL (ref 1–4.8)
LYMPHOCYTES NFR BLD: 9 %
MCH RBC QN AUTO: 30.6 PG (ref 26–34)
MCHC RBC AUTO-ENTMCNC: 35 G/DL (ref 32–36.5)
MCV RBC AUTO: 87.7 FL (ref 78–100)
MONOCYTES # BLD: 0.6 K/MCL (ref 0.3–0.9)
MONOCYTES NFR BLD: 5 %
NEUTROPHILS # BLD: 9.7 K/MCL (ref 1.8–7.7)
NEUTROPHILS NFR BLD: 86 %
NITRITE UR QL STRIP: NEGATIVE
NRBC BLD MANUAL-RTO: 0 /100 WBC
OPIATES UR QL SCN>300 NG/ML: NEGATIVE
PCP UR QL SCN>25 NG/ML: NEGATIVE
PH UR STRIP: 6 [PH] (ref 5–7)
PLATELET # BLD AUTO: 154 K/MCL (ref 140–450)
POTASSIUM SERPL-SCNC: 3.5 MMOL/L (ref 3.4–5.1)
PROT SERPL-MCNC: 8.2 G/DL (ref 6.4–8.2)
PROT UR STRIP-MCNC: NEGATIVE MG/DL
RBC # BLD: 4.7 MIL/MCL (ref 4–5.2)
RBC #/AREA URNS HPF: ABNORMAL /HPF
SODIUM SERPL-SCNC: 132 MMOL/L (ref 135–145)
SP GR UR STRIP: <=1.005 (ref 1–1.03)
SQUAMOUS #/AREA URNS HPF: ABNORMAL /HPF
UROBILINOGEN UR STRIP-MCNC: 0.2 MG/DL
WBC # BLD: 11.4 K/MCL (ref 4.2–11)
WBC #/AREA URNS HPF: ABNORMAL /HPF

## 2022-06-04 PROCEDURE — 93005 ELECTROCARDIOGRAM TRACING: CPT | Performed by: EMERGENCY MEDICINE

## 2022-06-04 PROCEDURE — 99285 EMERGENCY DEPT VISIT HI MDM: CPT

## 2022-06-04 PROCEDURE — 80053 COMPREHEN METABOLIC PANEL: CPT | Performed by: EMERGENCY MEDICINE

## 2022-06-04 PROCEDURE — 99285 EMERGENCY DEPT VISIT HI MDM: CPT | Performed by: EMERGENCY MEDICINE

## 2022-06-04 PROCEDURE — 82077 ASSAY SPEC XCP UR&BREATH IA: CPT | Performed by: EMERGENCY MEDICINE

## 2022-06-04 PROCEDURE — 93010 ELECTROCARDIOGRAM REPORT: CPT | Performed by: INTERNAL MEDICINE

## 2022-06-04 PROCEDURE — 85379 FIBRIN DEGRADATION QUANT: CPT | Performed by: STUDENT IN AN ORGANIZED HEALTH CARE EDUCATION/TRAINING PROGRAM

## 2022-06-04 PROCEDURE — 10002803 HB RX 637: Performed by: STUDENT IN AN ORGANIZED HEALTH CARE EDUCATION/TRAINING PROGRAM

## 2022-06-04 PROCEDURE — 96360 HYDRATION IV INFUSION INIT: CPT

## 2022-06-04 PROCEDURE — C9803 HOPD COVID-19 SPEC COLLECT: HCPCS

## 2022-06-04 PROCEDURE — 80307 DRUG TEST PRSMV CHEM ANLYZR: CPT | Performed by: EMERGENCY MEDICINE

## 2022-06-04 PROCEDURE — HZ2ZZZZ DETOXIFICATION SERVICES FOR SUBSTANCE ABUSE TREATMENT: ICD-10-PCS | Performed by: FAMILY MEDICINE

## 2022-06-04 PROCEDURE — 85025 COMPLETE CBC W/AUTO DIFF WBC: CPT | Performed by: EMERGENCY MEDICINE

## 2022-06-04 PROCEDURE — 84484 ASSAY OF TROPONIN QUANT: CPT | Performed by: STUDENT IN AN ORGANIZED HEALTH CARE EDUCATION/TRAINING PROGRAM

## 2022-06-04 PROCEDURE — 81025 URINE PREGNANCY TEST: CPT | Performed by: EMERGENCY MEDICINE

## 2022-06-04 PROCEDURE — 81001 URINALYSIS AUTO W/SCOPE: CPT | Performed by: EMERGENCY MEDICINE

## 2022-06-04 RX ORDER — CHLORDIAZEPOXIDE HYDROCHLORIDE 25 MG/1
100 CAPSULE, GELATIN COATED ORAL EVERY 6 HOURS
Status: DISCONTINUED | OUTPATIENT
Start: 2022-06-05 | End: 2022-06-05

## 2022-06-04 RX ORDER — LORAZEPAM 2 MG/ML
2 INJECTION INTRAMUSCULAR ONCE
Status: COMPLETED | OUTPATIENT
Start: 2022-06-04 | End: 2022-06-05

## 2022-06-04 RX ORDER — LORAZEPAM 1 MG/1
2 TABLET ORAL ONCE
Status: COMPLETED | OUTPATIENT
Start: 2022-06-04 | End: 2022-06-04

## 2022-06-04 RX ADMIN — LORAZEPAM 2 MG: 1 TABLET ORAL at 21:42

## 2022-06-04 ASSESSMENT — ENCOUNTER SYMPTOMS
EYES NEGATIVE: 1
GASTROINTESTINAL NEGATIVE: 1
RESPIRATORY NEGATIVE: 1
ENDOCRINE NEGATIVE: 1
HEMATOLOGIC/LYMPHATIC NEGATIVE: 1
ALLERGIC/IMMUNOLOGIC NEGATIVE: 1
CONSTITUTIONAL NEGATIVE: 1
NEUROLOGICAL NEGATIVE: 1
PSYCHIATRIC NEGATIVE: 1

## 2022-06-05 ENCOUNTER — APPOINTMENT (OUTPATIENT)
Dept: CT IMAGING | Age: 42
End: 2022-06-05
Attending: STUDENT IN AN ORGANIZED HEALTH CARE EDUCATION/TRAINING PROGRAM

## 2022-06-05 PROBLEM — F10.920 ALCOHOLIC INTOXICATION WITHOUT COMPLICATION (CMD): Status: ACTIVE | Noted: 2022-06-05

## 2022-06-05 LAB
ANION GAP SERPL CALC-SCNC: 13 MMOL/L (ref 7–19)
ATRIAL RATE (BPM): 115
BUN SERPL-MCNC: 4 MG/DL (ref 6–20)
BUN/CREAT SERPL: 9 (ref 7–25)
CALCIUM SERPL-MCNC: 9.5 MG/DL (ref 8.4–10.2)
CHLORIDE SERPL-SCNC: 99 MMOL/L (ref 97–110)
CO2 SERPL-SCNC: 27 MMOL/L (ref 21–32)
CREAT SERPL-MCNC: 0.44 MG/DL (ref 0.51–0.95)
D DIMER PPP FEU-MCNC: 1.13 MG/L (FEU)
FASTING DURATION TIME PATIENT: ABNORMAL H
FLUAV RNA RESP QL NAA+PROBE: NOT DETECTED
FLUBV RNA RESP QL NAA+PROBE: NOT DETECTED
GFR SERPLBLD BASED ON 1.73 SQ M-ARVRAT: >90 ML/MIN
GLUCOSE SERPL-MCNC: 105 MG/DL (ref 70–99)
P AXIS (DEGREES): 86
POTASSIUM SERPL-SCNC: 3.1 MMOL/L (ref 3.4–5.1)
PR-INTERVAL (MSEC): 150
QRS-INTERVAL (MSEC): 98
QT-INTERVAL (MSEC): 346
QTC: 479
R AXIS (DEGREES): 93
REPORT TEXT: NORMAL
RSV AG NPH QL IA.RAPID: NOT DETECTED
SARS-COV-2 RNA RESP QL NAA+PROBE: NOT DETECTED
SERVICE CMNT-IMP: NORMAL
SERVICE CMNT-IMP: NORMAL
SODIUM SERPL-SCNC: 136 MMOL/L (ref 135–145)
T AXIS (DEGREES): 66
TROPONIN I SERPL DL<=0.01 NG/ML-MCNC: 49 NG/L
VENTRICULAR RATE EKG/MIN (BPM): 115

## 2022-06-05 PROCEDURE — 80048 BASIC METABOLIC PNL TOTAL CA: CPT | Performed by: INTERNAL MEDICINE

## 2022-06-05 PROCEDURE — 10002803 HB RX 637: Performed by: INTERNAL MEDICINE

## 2022-06-05 PROCEDURE — 0241U COVID/FLU/RSV PANEL: CPT | Performed by: STUDENT IN AN ORGANIZED HEALTH CARE EDUCATION/TRAINING PROGRAM

## 2022-06-05 PROCEDURE — 10002807 HB RX 258: Performed by: STUDENT IN AN ORGANIZED HEALTH CARE EDUCATION/TRAINING PROGRAM

## 2022-06-05 PROCEDURE — 10006031 HB ROOM CHARGE TELEMETRY

## 2022-06-05 PROCEDURE — 10002805 HB CONTRAST AGENT: Performed by: STUDENT IN AN ORGANIZED HEALTH CARE EDUCATION/TRAINING PROGRAM

## 2022-06-05 PROCEDURE — 10002803 HB RX 637: Performed by: FAMILY MEDICINE

## 2022-06-05 PROCEDURE — 10002803 HB RX 637: Performed by: STUDENT IN AN ORGANIZED HEALTH CARE EDUCATION/TRAINING PROGRAM

## 2022-06-05 PROCEDURE — G1004 CDSM NDSC: HCPCS

## 2022-06-05 PROCEDURE — 10002800 HB RX 250 W HCPCS: Performed by: STUDENT IN AN ORGANIZED HEALTH CARE EDUCATION/TRAINING PROGRAM

## 2022-06-05 PROCEDURE — 36415 COLL VENOUS BLD VENIPUNCTURE: CPT | Performed by: INTERNAL MEDICINE

## 2022-06-05 PROCEDURE — 71275 CT ANGIOGRAPHY CHEST: CPT

## 2022-06-05 RX ORDER — LORAZEPAM 2 MG/ML
1 INJECTION INTRAMUSCULAR EVERY 4 HOURS PRN
Status: DISCONTINUED | OUTPATIENT
Start: 2022-06-05 | End: 2022-06-05

## 2022-06-05 RX ORDER — CHOLECALCIFEROL (VITAMIN D3) 10(400)/ML
10 DROPS ORAL DAILY
Status: DISCONTINUED | OUTPATIENT
Start: 2022-06-05 | End: 2022-06-06 | Stop reason: HOSPADM

## 2022-06-05 RX ORDER — LEVETIRACETAM 500 MG/1
500 TABLET ORAL DAILY
Status: DISCONTINUED | OUTPATIENT
Start: 2022-06-05 | End: 2022-06-06 | Stop reason: HOSPADM

## 2022-06-05 RX ORDER — LOPERAMIDE HYDROCHLORIDE 2 MG/1
2 CAPSULE ORAL PRN
Status: DISCONTINUED | OUTPATIENT
Start: 2022-06-05 | End: 2022-06-06 | Stop reason: HOSPADM

## 2022-06-05 RX ORDER — CHLORDIAZEPOXIDE HYDROCHLORIDE 25 MG/1
25 CAPSULE, GELATIN COATED ORAL EVERY 8 HOURS
Status: DISCONTINUED | OUTPATIENT
Start: 2022-06-05 | End: 2022-06-06 | Stop reason: HOSPADM

## 2022-06-05 RX ORDER — BACLOFEN 10 MG/1
20 TABLET ORAL 4 TIMES DAILY
Status: DISCONTINUED | OUTPATIENT
Start: 2022-06-05 | End: 2022-06-06 | Stop reason: HOSPADM

## 2022-06-05 RX ORDER — GABAPENTIN 300 MG/1
300 CAPSULE ORAL 3 TIMES DAILY
Status: DISCONTINUED | OUTPATIENT
Start: 2022-06-05 | End: 2022-06-06 | Stop reason: HOSPADM

## 2022-06-05 RX ORDER — LORAZEPAM 2 MG/ML
3 INJECTION INTRAMUSCULAR
Status: DISCONTINUED | OUTPATIENT
Start: 2022-06-05 | End: 2022-06-06 | Stop reason: HOSPADM

## 2022-06-05 RX ORDER — LEVETIRACETAM 500 MG/1
1000 TABLET ORAL ONCE
Status: COMPLETED | OUTPATIENT
Start: 2022-06-05 | End: 2022-06-05

## 2022-06-05 RX ORDER — FOLIC ACID 1 MG/1
1 TABLET ORAL DAILY
Status: DISCONTINUED | OUTPATIENT
Start: 2022-06-05 | End: 2022-06-06 | Stop reason: HOSPADM

## 2022-06-05 RX ORDER — AMLODIPINE BESYLATE 2.5 MG/1
2.5 TABLET ORAL DAILY
Status: DISCONTINUED | OUTPATIENT
Start: 2022-06-05 | End: 2022-06-06 | Stop reason: HOSPADM

## 2022-06-05 RX ORDER — HYDROXYZINE HYDROCHLORIDE 25 MG/1
25 TABLET, FILM COATED ORAL 3 TIMES DAILY PRN
Status: DISCONTINUED | OUTPATIENT
Start: 2022-06-05 | End: 2022-06-06 | Stop reason: HOSPADM

## 2022-06-05 RX ORDER — LORAZEPAM 2 MG/ML
4 INJECTION INTRAMUSCULAR
Status: DISCONTINUED | OUTPATIENT
Start: 2022-06-05 | End: 2022-06-06 | Stop reason: HOSPADM

## 2022-06-05 RX ORDER — LORAZEPAM 2 MG/ML
2 INJECTION INTRAMUSCULAR
Status: DISCONTINUED | OUTPATIENT
Start: 2022-06-05 | End: 2022-06-06 | Stop reason: HOSPADM

## 2022-06-05 RX ADMIN — TRAZODONE HYDROCHLORIDE 150 MG: 100 TABLET ORAL at 21:46

## 2022-06-05 RX ADMIN — CHLORDIAZEPOXIDE HYDROCHLORIDE 25 MG: 25 CAPSULE ORAL at 11:58

## 2022-06-05 RX ADMIN — SODIUM CHLORIDE 1000 ML: 9 INJECTION, SOLUTION INTRAVENOUS at 00:22

## 2022-06-05 RX ADMIN — SERTRALINE HYDROCHLORIDE 150 MG: 100 TABLET, FILM COATED ORAL at 21:46

## 2022-06-05 RX ADMIN — BACLOFEN 20 MG: 10 TABLET ORAL at 21:45

## 2022-06-05 RX ADMIN — THIAMINE HCL TAB 100 MG 100 MG: 100 TAB at 11:59

## 2022-06-05 RX ADMIN — GABAPENTIN 300 MG: 300 CAPSULE ORAL at 21:46

## 2022-06-05 RX ADMIN — FOLIC ACID 1 MG: 1 TABLET ORAL at 11:59

## 2022-06-05 RX ADMIN — CHLORDIAZEPOXIDE HYDROCHLORIDE 25 MG: 25 CAPSULE ORAL at 21:46

## 2022-06-05 RX ADMIN — LORAZEPAM 2 MG: 2 INJECTION INTRAMUSCULAR; INTRAVENOUS at 14:39

## 2022-06-05 RX ADMIN — LEVETIRACETAM 500 MG: 500 TABLET, FILM COATED ORAL at 08:48

## 2022-06-05 RX ADMIN — IOHEXOL 90 ML: 300 INJECTION, SOLUTION INTRAVENOUS at 01:58

## 2022-06-05 RX ADMIN — CHLORDIAZEPOXIDE HYDROCHLORIDE 100 MG: 25 CAPSULE ORAL at 00:22

## 2022-06-05 RX ADMIN — CHLORDIAZEPOXIDE HYDROCHLORIDE 100 MG: 25 CAPSULE ORAL at 06:19

## 2022-06-05 RX ADMIN — LEVETIRACETAM 1000 MG: 500 TABLET, FILM COATED ORAL at 06:20

## 2022-06-05 ASSESSMENT — LIFESTYLE VARIABLES
NAUSEA AND VOMITING: NO NAUSEA AND NO VOMITING
NAUSEA AND VOMITING: NO NAUSEA AND NO VOMITING
ANXIETY: MILDLY ANXIOUS
AUDITORY DISTURBANCES: NOT PRESENT
AUDITORY DISTURBANCES: NOT PRESENT
HEADACHE, FULLNESS IN HEAD: NOT PRESENT
AGITATION: NORMAL ACTIVITY
VISUAL DISTURBANCES: NOT PRESENT
AGITATION: NORMAL ACTIVITY
HEADACHE, FULLNESS IN HEAD: NOT PRESENT
ANXIETY: NO ANXIETY, AT EASE
AGITATION: SOMEWHAT MORE THAN NORMAL ACTIVITY
PAROXYSMAL SWEATS: BARELY PERCEPTIBLE SWEATING, PALMS MOIST
PAROXYSMAL SWEATS: NO SWEAT VISIBLE
NAUSEA AND VOMITING: NO NAUSEA AND NO VOMITING
PAROXYSMAL SWEATS: BARELY PERCEPTIBLE SWEATING, PALMS MOIST
VISUAL DISTURBANCES: NOT PRESENT
AUDITORY DISTURBANCES: NOT PRESENT
TREMOR: SEVERE, EVEN WITH ARMS NOT EXTENDED
TREMOR: 2
HEADACHE, FULLNESS IN HEAD: NOT PRESENT
ANXIETY: MILDLY ANXIOUS
TREMOR: 2
VISUAL DISTURBANCES: NOT PRESENT

## 2022-06-05 ASSESSMENT — PAIN SCALES - GENERAL
PAINLEVEL_OUTOF10: 0
PAINLEVEL_OUTOF10: 8

## 2022-06-05 ASSESSMENT — ENCOUNTER SYMPTOMS
VOMITING: 0
DIZZINESS: 0
FOCAL WEAKNESS: 0
ABDOMINAL PAIN: 0
FEVER: 0
SHORTNESS OF BREATH: 0
COUGH: 0
DOUBLE VISION: 0
CHILLS: 0
BLURRED VISION: 0
DIAPHORESIS: 1
HEADACHES: 0
BACK PAIN: 0
NAUSEA: 0

## 2022-06-06 VITALS
HEART RATE: 92 BPM | WEIGHT: 162.04 LBS | DIASTOLIC BLOOD PRESSURE: 81 MMHG | BODY MASS INDEX: 26.15 KG/M2 | SYSTOLIC BLOOD PRESSURE: 113 MMHG | RESPIRATION RATE: 16 BRPM | TEMPERATURE: 98.2 F | OXYGEN SATURATION: 98 %

## 2022-06-06 PROBLEM — F10.920 ALCOHOLIC INTOXICATION WITHOUT COMPLICATION (CMD): Status: RESOLVED | Noted: 2022-06-05 | Resolved: 2022-06-06

## 2022-06-06 LAB
ALBUMIN SERPL-MCNC: 3.4 G/DL (ref 3.6–5.1)
ALBUMIN/GLOB SERPL: 1 {RATIO} (ref 1–2.4)
ALP SERPL-CCNC: 70 UNITS/L (ref 45–117)
ALT SERPL-CCNC: 76 UNITS/L
ANION GAP SERPL CALC-SCNC: 11 MMOL/L (ref 7–19)
AST SERPL-CCNC: 53 UNITS/L
BASOPHILS # BLD: 0 K/MCL (ref 0–0.3)
BASOPHILS NFR BLD: 0 %
BILIRUB SERPL-MCNC: 0.6 MG/DL (ref 0.2–1)
BUN SERPL-MCNC: 7 MG/DL (ref 6–20)
BUN/CREAT SERPL: 14 (ref 7–25)
CALCIUM SERPL-MCNC: 9 MG/DL (ref 8.4–10.2)
CHLORIDE SERPL-SCNC: 101 MMOL/L (ref 97–110)
CO2 SERPL-SCNC: 30 MMOL/L (ref 21–32)
CREAT SERPL-MCNC: 0.51 MG/DL (ref 0.51–0.95)
DEPRECATED RDW RBC: 46.3 FL (ref 39–50)
EOSINOPHIL # BLD: 0.2 K/MCL (ref 0–0.5)
EOSINOPHIL NFR BLD: 3 %
ERYTHROCYTE [DISTWIDTH] IN BLOOD: 14.4 % (ref 11–15)
FASTING DURATION TIME PATIENT: ABNORMAL H
GFR SERPLBLD BASED ON 1.73 SQ M-ARVRAT: >90 ML/MIN
GLOBULIN SER-MCNC: 3.4 G/DL (ref 2–4)
GLUCOSE SERPL-MCNC: 105 MG/DL (ref 70–99)
HCT VFR BLD CALC: 36.2 % (ref 36–46.5)
HGB BLD-MCNC: 12.5 G/DL (ref 12–15.5)
IMM GRANULOCYTES # BLD AUTO: 0 K/MCL (ref 0–0.2)
IMM GRANULOCYTES # BLD: 0 %
LYMPHOCYTES # BLD: 2.1 K/MCL (ref 1–4.8)
LYMPHOCYTES NFR BLD: 33 %
MCH RBC QN AUTO: 30.6 PG (ref 26–34)
MCHC RBC AUTO-ENTMCNC: 34.5 G/DL (ref 32–36.5)
MCV RBC AUTO: 88.7 FL (ref 78–100)
MONOCYTES # BLD: 0.5 K/MCL (ref 0.3–0.9)
MONOCYTES NFR BLD: 7 %
NEUTROPHILS # BLD: 3.6 K/MCL (ref 1.8–7.7)
NEUTROPHILS NFR BLD: 57 %
NRBC BLD MANUAL-RTO: 0 /100 WBC
PLATELET # BLD AUTO: 115 K/MCL (ref 140–450)
POTASSIUM SERPL-SCNC: 3 MMOL/L (ref 3.4–5.1)
PROT SERPL-MCNC: 6.8 G/DL (ref 6.4–8.2)
RBC # BLD: 4.08 MIL/MCL (ref 4–5.2)
SODIUM SERPL-SCNC: 139 MMOL/L (ref 135–145)
WBC # BLD: 6.3 K/MCL (ref 4.2–11)

## 2022-06-06 PROCEDURE — 10002800 HB RX 250 W HCPCS: Performed by: INTERNAL MEDICINE

## 2022-06-06 PROCEDURE — 10002803 HB RX 637: Performed by: FAMILY MEDICINE

## 2022-06-06 PROCEDURE — 36415 COLL VENOUS BLD VENIPUNCTURE: CPT | Performed by: FAMILY MEDICINE

## 2022-06-06 PROCEDURE — 85025 COMPLETE CBC W/AUTO DIFF WBC: CPT | Performed by: FAMILY MEDICINE

## 2022-06-06 PROCEDURE — 10002801 HB RX 250 W/O HCPCS: Performed by: INTERNAL MEDICINE

## 2022-06-06 PROCEDURE — 10002803 HB RX 637: Performed by: INTERNAL MEDICINE

## 2022-06-06 PROCEDURE — 80053 COMPREHEN METABOLIC PANEL: CPT | Performed by: FAMILY MEDICINE

## 2022-06-06 RX ORDER — HYDROCODONE BITARTRATE AND ACETAMINOPHEN 5; 325 MG/1; MG/1
1 TABLET ORAL EVERY 6 HOURS PRN
Status: DISCONTINUED | OUTPATIENT
Start: 2022-06-06 | End: 2022-06-06 | Stop reason: HOSPADM

## 2022-06-06 RX ORDER — POTASSIUM CHLORIDE 20 MEQ/1
40 TABLET, EXTENDED RELEASE ORAL ONCE
Status: COMPLETED | OUTPATIENT
Start: 2022-06-06 | End: 2022-06-06

## 2022-06-06 RX ADMIN — SERTRALINE HYDROCHLORIDE 150 MG: 100 TABLET, FILM COATED ORAL at 08:37

## 2022-06-06 RX ADMIN — BACLOFEN 20 MG: 10 TABLET ORAL at 08:28

## 2022-06-06 RX ADMIN — GABAPENTIN 300 MG: 300 CAPSULE ORAL at 13:56

## 2022-06-06 RX ADMIN — FOLIC ACID 1 MG: 1 TABLET ORAL at 08:28

## 2022-06-06 RX ADMIN — MORPHINE SULFATE 2 MG: 2 INJECTION, SOLUTION INTRAMUSCULAR; INTRAVENOUS at 06:56

## 2022-06-06 RX ADMIN — BACLOFEN 20 MG: 10 TABLET ORAL at 13:57

## 2022-06-06 RX ADMIN — CHLORDIAZEPOXIDE HYDROCHLORIDE 25 MG: 25 CAPSULE ORAL at 04:29

## 2022-06-06 RX ADMIN — Medication 10 MCG: at 10:37

## 2022-06-06 RX ADMIN — POTASSIUM CHLORIDE 40 MEQ: 1500 TABLET, EXTENDED RELEASE ORAL at 10:48

## 2022-06-06 RX ADMIN — LEVETIRACETAM 500 MG: 500 TABLET, FILM COATED ORAL at 08:28

## 2022-06-06 RX ADMIN — AMLODIPINE BESYLATE 2.5 MG: 2.5 TABLET ORAL at 08:28

## 2022-06-06 RX ADMIN — GABAPENTIN 300 MG: 300 CAPSULE ORAL at 08:28

## 2022-06-06 RX ADMIN — THIAMINE HCL TAB 100 MG 100 MG: 100 TAB at 08:28

## 2022-06-06 RX ADMIN — CHLORDIAZEPOXIDE HYDROCHLORIDE 25 MG: 25 CAPSULE ORAL at 13:56

## 2022-06-06 ASSESSMENT — LIFESTYLE VARIABLES
VISUAL DISTURBANCES: NOT PRESENT
AGITATION: NORMAL ACTIVITY
ANXIETY: NO ANXIETY, AT EASE
HEADACHE, FULLNESS IN HEAD: NOT PRESENT
AUDITORY DISTURBANCES: NOT PRESENT
AUDITORY DISTURBANCES: NOT PRESENT
TREMOR: 2
VISUAL DISTURBANCES: NOT PRESENT
VISUAL DISTURBANCES: NOT PRESENT
TREMOR: 2
ANXIETY: NO ANXIETY, AT EASE
AGITATION: NORMAL ACTIVITY
AUDITORY DISTURBANCES: NOT PRESENT
VISUAL DISTURBANCES: NOT PRESENT
AUDITORY DISTURBANCES: NOT PRESENT
NAUSEA AND VOMITING: NO NAUSEA AND NO VOMITING
NAUSEA AND VOMITING: NO NAUSEA AND NO VOMITING
TREMOR: 2
TREMOR: NOT VISIBLE, BUT CAN BE FELT FINGERTIP TO FINGERTIP
NAUSEA AND VOMITING: NO NAUSEA AND NO VOMITING
ANXIETY: NO ANXIETY, AT EASE
PAROXYSMAL SWEATS: NO SWEAT VISIBLE
HEADACHE, FULLNESS IN HEAD: NOT PRESENT
ANXIETY: NO ANXIETY, AT EASE
TREMOR: 2
NAUSEA AND VOMITING: NO NAUSEA AND NO VOMITING
TACTILE DISTURBANCES: MILD ITCHING, PINS AND NEEDLES, BURNING OR NUMBNESS
TACTILE DISTURBANCES: MILD ITCHING, PINS AND NEEDLES, BURNING OR NUMBNESS
AUDITORY DISTURBANCES: NOT PRESENT
AUDITORY DISTURBANCES: NOT PRESENT
NAUSEA AND VOMITING: NO NAUSEA AND NO VOMITING
VISUAL DISTURBANCES: NOT PRESENT
HEADACHE, FULLNESS IN HEAD: NOT PRESENT
AGITATION: NORMAL ACTIVITY
PAROXYSMAL SWEATS: NO SWEAT VISIBLE
HEADACHE, FULLNESS IN HEAD: NOT PRESENT
HEADACHE, FULLNESS IN HEAD: NOT PRESENT
PAROXYSMAL SWEATS: NO SWEAT VISIBLE
VISUAL DISTURBANCES: NOT PRESENT
NAUSEA AND VOMITING: NO NAUSEA AND NO VOMITING
HEADACHE, FULLNESS IN HEAD: NOT PRESENT
PAROXYSMAL SWEATS: NO SWEAT VISIBLE
ANXIETY: NO ANXIETY, AT EASE
TREMOR: NOT VISIBLE, BUT CAN BE FELT FINGERTIP TO FINGERTIP
NAUSEA AND VOMITING: NO NAUSEA AND NO VOMITING
VISUAL DISTURBANCES: NOT PRESENT
ANXIETY: NO ANXIETY, AT EASE
AGITATION: NORMAL ACTIVITY
NAUSEA AND VOMITING: NO NAUSEA AND NO VOMITING
PAROXYSMAL SWEATS: NO SWEAT VISIBLE
PAROXYSMAL SWEATS: BARELY PERCEPTIBLE SWEATING, PALMS MOIST
TACTILE DISTURBANCES: VERY MILD ITCHING, PINS AND NEEDLES BURNING OR NUMBNESS
TREMOR: NOT VISIBLE, BUT CAN BE FELT FINGERTIP TO FINGERTIP
ANXIETY: NO ANXIETY, AT EASE
PAROXYSMAL SWEATS: NO SWEAT VISIBLE
AUDITORY DISTURBANCES: NOT PRESENT
AUDITORY DISTURBANCES: NOT PRESENT
AGITATION: NORMAL ACTIVITY
PAROXYSMAL SWEATS: NO SWEAT VISIBLE
AGITATION: NORMAL ACTIVITY
TACTILE DISTURBANCES: MILD ITCHING, PINS AND NEEDLES, BURNING OR NUMBNESS
ANXIETY: NO ANXIETY, AT EASE
TREMOR: 2
VISUAL DISTURBANCES: NOT PRESENT
AGITATION: NORMAL ACTIVITY
AGITATION: NORMAL ACTIVITY

## 2022-06-06 ASSESSMENT — PAIN SCALES - GENERAL: PAINLEVEL_OUTOF10: 0

## 2022-06-07 ENCOUNTER — TELEPHONE (OUTPATIENT)
Dept: SCHEDULING | Age: 42
End: 2022-06-07

## 2022-06-08 ENCOUNTER — DOCUMENTATION (OUTPATIENT)
Dept: BEHAVIORAL HEALTH | Age: 42
End: 2022-06-08

## 2022-06-08 ENCOUNTER — APPOINTMENT (OUTPATIENT)
Dept: BEHAVIORAL HEALTH | Age: 42
End: 2022-06-08
Attending: PSYCHIATRY & NEUROLOGY

## 2022-06-08 ENCOUNTER — NURSE TRIAGE (OUTPATIENT)
Dept: SCHEDULING | Age: 42
End: 2022-06-08

## 2022-06-08 ENCOUNTER — TELEPHONE (OUTPATIENT)
Dept: OBGYN | Age: 42
End: 2022-06-08

## 2022-06-14 ENCOUNTER — TELEPHONE (OUTPATIENT)
Dept: OBGYN | Age: 42
End: 2022-06-14

## 2022-06-15 ENCOUNTER — APPOINTMENT (OUTPATIENT)
Dept: BEHAVIORAL HEALTH | Age: 42
End: 2022-06-15
Attending: PSYCHIATRY & NEUROLOGY

## 2022-06-20 DIAGNOSIS — G62.9 NEUROPATHY: ICD-10-CM

## 2022-06-20 DIAGNOSIS — I10 BENIGN ESSENTIAL HTN: ICD-10-CM

## 2022-06-20 DIAGNOSIS — F10.20 ALCOHOL USE DISORDER, SEVERE, DEPENDENCE (CMD): ICD-10-CM

## 2022-06-20 RX ORDER — AMLODIPINE BESYLATE 2.5 MG/1
2.5 TABLET ORAL DAILY
Qty: 90 TABLET | Refills: 3 | Status: SHIPPED | OUTPATIENT
Start: 2022-06-20 | End: 2022-11-21 | Stop reason: CLARIF

## 2022-06-20 RX ORDER — GABAPENTIN 300 MG/1
CAPSULE ORAL
Qty: 90 CAPSULE | Refills: 5 | Status: ON HOLD | OUTPATIENT
Start: 2022-06-20 | End: 2023-02-27

## 2022-06-24 ENCOUNTER — TELEPHONE (OUTPATIENT)
Dept: NEUROLOGY | Age: 42
End: 2022-06-24

## 2022-07-07 ENCOUNTER — TELEPHONE (OUTPATIENT)
Dept: SCHEDULING | Age: 42
End: 2022-07-07

## 2022-07-18 ENCOUNTER — TELEPHONE (OUTPATIENT)
Dept: BEHAVIORAL HEALTH | Age: 42
End: 2022-07-18

## 2022-07-26 ENCOUNTER — TELEPHONE (OUTPATIENT)
Dept: BEHAVIORAL HEALTH | Age: 42
End: 2022-07-26

## 2022-08-08 ENCOUNTER — TELEPHONE (OUTPATIENT)
Dept: BEHAVIORAL HEALTH | Age: 42
End: 2022-08-08

## 2022-08-15 ENCOUNTER — DOCUMENTATION (OUTPATIENT)
Dept: BEHAVIORAL HEALTH | Age: 42
End: 2022-08-15

## 2022-08-17 ENCOUNTER — APPOINTMENT (OUTPATIENT)
Dept: CT IMAGING | Age: 42
End: 2022-08-17
Attending: INTERNAL MEDICINE

## 2022-08-17 ENCOUNTER — APPOINTMENT (OUTPATIENT)
Dept: GENERAL RADIOLOGY | Age: 42
End: 2022-08-17
Attending: EMERGENCY MEDICINE

## 2022-08-17 ENCOUNTER — HOSPITAL ENCOUNTER (EMERGENCY)
Age: 42
Discharge: HOME | End: 2022-08-18
Attending: EMERGENCY MEDICINE

## 2022-08-17 VITALS
RESPIRATION RATE: 16 BRPM | SYSTOLIC BLOOD PRESSURE: 122 MMHG | TEMPERATURE: 97.7 F | DIASTOLIC BLOOD PRESSURE: 77 MMHG | HEART RATE: 95 BPM | OXYGEN SATURATION: 97 %

## 2022-08-17 DIAGNOSIS — R46.89 AGGRESSIVE BEHAVIOR: ICD-10-CM

## 2022-08-17 DIAGNOSIS — F10.920 ALCOHOLIC INTOXICATION WITHOUT COMPLICATION (CMD): ICD-10-CM

## 2022-08-17 DIAGNOSIS — E86.0 DEHYDRATION: ICD-10-CM

## 2022-08-17 DIAGNOSIS — R41.82 ALTERED MENTAL STATUS, UNSPECIFIED ALTERED MENTAL STATUS TYPE: Primary | ICD-10-CM

## 2022-08-17 DIAGNOSIS — E87.20 ACIDOSIS, LACTIC: ICD-10-CM

## 2022-08-17 LAB
ALBUMIN SERPL-MCNC: 3.9 G/DL (ref 3.6–5.1)
ALBUMIN/GLOB SERPL: 1 {RATIO} (ref 1–2.4)
ALP SERPL-CCNC: 91 UNITS/L (ref 45–117)
ALT SERPL-CCNC: 51 UNITS/L
AMPHETAMINES UR QL SCN>500 NG/ML: NEGATIVE
ANION GAP SERPL CALC-SCNC: 15 MMOL/L (ref 7–19)
ANION GAP SERPL CALC-SCNC: 21 MMOL/L (ref 7–19)
ANION GAP SERPL CALC-SCNC: 26 MMOL/L (ref 7–19)
APAP SERPL-MCNC: <2 MCG/ML (ref 10–30)
APPEARANCE UR: CLEAR
AST SERPL-CCNC: 53 UNITS/L
BACTERIA #/AREA URNS HPF: ABNORMAL /HPF
BARBITURATES UR QL SCN>200 NG/ML: NEGATIVE
BASOPHILS # BLD: 0.1 K/MCL (ref 0–0.3)
BASOPHILS NFR BLD: 1 %
BENZODIAZ UR QL SCN>200 NG/ML: POSITIVE
BILIRUB SERPL-MCNC: 0.5 MG/DL (ref 0.2–1)
BILIRUB UR QL STRIP: NEGATIVE
BUN SERPL-MCNC: 3 MG/DL (ref 6–20)
BUN SERPL-MCNC: 4 MG/DL (ref 6–20)
BUN SERPL-MCNC: 4 MG/DL (ref 6–20)
BUN/CREAT SERPL: 10 (ref 7–25)
BUN/CREAT SERPL: 7 (ref 7–25)
BUN/CREAT SERPL: 9 (ref 7–25)
BZE UR QL SCN>150 NG/ML: NEGATIVE
CALCIUM SERPL-MCNC: 7 MG/DL (ref 8.4–10.2)
CALCIUM SERPL-MCNC: 8.7 MG/DL (ref 8.4–10.2)
CALCIUM SERPL-MCNC: 9 MG/DL (ref 8.4–10.2)
CANNABINOIDS UR QL SCN>50 NG/ML: NEGATIVE
CHLORIDE SERPL-SCNC: 102 MMOL/L (ref 97–110)
CHLORIDE SERPL-SCNC: 108 MMOL/L (ref 97–110)
CHLORIDE SERPL-SCNC: 98 MMOL/L (ref 97–110)
CK SERPL-CCNC: 250 UNITS/L (ref 26–192)
CO2 SERPL-SCNC: 17 MMOL/L (ref 21–32)
CO2 SERPL-SCNC: 22 MMOL/L (ref 21–32)
CO2 SERPL-SCNC: 23 MMOL/L (ref 21–32)
COLOR UR: ABNORMAL
CREAT SERPL-MCNC: 0.42 MG/DL (ref 0.51–0.95)
CREAT SERPL-MCNC: 0.42 MG/DL (ref 0.51–0.95)
CREAT SERPL-MCNC: 0.47 MG/DL (ref 0.51–0.95)
DEPRECATED RDW RBC: 48.6 FL (ref 39–50)
EOSINOPHIL # BLD: 0.1 K/MCL (ref 0–0.5)
EOSINOPHIL NFR BLD: 1 %
ERYTHROCYTE [DISTWIDTH] IN BLOOD: 14.4 % (ref 11–15)
ETHANOL SERPL-MCNC: 274 MG/DL
FASTING DURATION TIME PATIENT: ABNORMAL H
GFR SERPLBLD BASED ON 1.73 SQ M-ARVRAT: >90 ML/MIN
GLOBULIN SER-MCNC: 3.8 G/DL (ref 2–4)
GLUCOSE SERPL-MCNC: 110 MG/DL (ref 70–99)
GLUCOSE SERPL-MCNC: 123 MG/DL (ref 70–99)
GLUCOSE SERPL-MCNC: 86 MG/DL (ref 70–99)
GLUCOSE UR STRIP-MCNC: NEGATIVE MG/DL
HCG SERPL QL: NEGATIVE
HCT VFR BLD CALC: 39.5 % (ref 36–46.5)
HGB BLD-MCNC: 13.5 G/DL (ref 12–15.5)
HGB UR QL STRIP: ABNORMAL
HYALINE CASTS #/AREA URNS LPF: ABNORMAL /LPF
IMM GRANULOCYTES # BLD AUTO: 0 K/MCL (ref 0–0.2)
IMM GRANULOCYTES # BLD: 0 %
INR PPP: 1
KETONES UR STRIP-MCNC: NEGATIVE MG/DL
LACTATE BLDV-SCNC: 3 MMOL/L (ref 0–2)
LACTATE BLDV-SCNC: 3.5 MMOL/L (ref 0–2)
LEUKOCYTE ESTERASE UR QL STRIP: ABNORMAL
LYMPHOCYTES # BLD: 2 K/MCL (ref 1–4.8)
LYMPHOCYTES NFR BLD: 18 %
MCH RBC QN AUTO: 31.3 PG (ref 26–34)
MCHC RBC AUTO-ENTMCNC: 34.2 G/DL (ref 32–36.5)
MCV RBC AUTO: 91.4 FL (ref 78–100)
MONOCYTES # BLD: 0.8 K/MCL (ref 0.3–0.9)
MONOCYTES NFR BLD: 7 %
NEUTROPHILS # BLD: 8.2 K/MCL (ref 1.8–7.7)
NEUTROPHILS NFR BLD: 73 %
NITRITE UR QL STRIP: NEGATIVE
NRBC BLD MANUAL-RTO: 0 /100 WBC
OPIATES UR QL SCN>300 NG/ML: NEGATIVE
PCP UR QL SCN>25 NG/ML: NEGATIVE
PH UR STRIP: 6 [PH] (ref 5–7)
PLATELET # BLD AUTO: 179 K/MCL (ref 140–450)
POTASSIUM SERPL-SCNC: 3.3 MMOL/L (ref 3.4–5.1)
POTASSIUM SERPL-SCNC: 4 MMOL/L (ref 3.4–5.1)
POTASSIUM SERPL-SCNC: 4 MMOL/L (ref 3.4–5.1)
PROT SERPL-MCNC: 7.7 G/DL (ref 6.4–8.2)
PROT UR STRIP-MCNC: NEGATIVE MG/DL
PROTHROMBIN TIME: 10.5 SEC (ref 9.7–11.8)
RAINBOW EXTRA TUBES HOLD SPECIMEN: NORMAL
RBC # BLD: 4.32 MIL/MCL (ref 4–5.2)
RBC #/AREA URNS HPF: ABNORMAL /HPF
SALICYLATES SERPL-MCNC: <2.8 MG/DL
SODIUM SERPL-SCNC: 137 MMOL/L (ref 135–145)
SODIUM SERPL-SCNC: 141 MMOL/L (ref 135–145)
SODIUM SERPL-SCNC: 143 MMOL/L (ref 135–145)
SP GR UR STRIP: <1.005 (ref 1–1.03)
SQUAMOUS #/AREA URNS HPF: ABNORMAL /HPF
UROBILINOGEN UR STRIP-MCNC: 0.2 MG/DL
WBC # BLD: 11.1 K/MCL (ref 4.2–11)
WBC #/AREA URNS HPF: ABNORMAL /HPF

## 2022-08-17 PROCEDURE — 83605 ASSAY OF LACTIC ACID: CPT | Performed by: INTERNAL MEDICINE

## 2022-08-17 PROCEDURE — 84703 CHORIONIC GONADOTROPIN ASSAY: CPT | Performed by: INTERNAL MEDICINE

## 2022-08-17 PROCEDURE — 81001 URINALYSIS AUTO W/SCOPE: CPT | Performed by: INTERNAL MEDICINE

## 2022-08-17 PROCEDURE — 10002807 HB RX 258: Performed by: EMERGENCY MEDICINE

## 2022-08-17 PROCEDURE — 96376 TX/PRO/DX INJ SAME DRUG ADON: CPT

## 2022-08-17 PROCEDURE — 96372 THER/PROPH/DIAG INJ SC/IM: CPT

## 2022-08-17 PROCEDURE — 80143 DRUG ASSAY ACETAMINOPHEN: CPT | Performed by: INTERNAL MEDICINE

## 2022-08-17 PROCEDURE — 99285 EMERGENCY DEPT VISIT HI MDM: CPT | Performed by: EMERGENCY MEDICINE

## 2022-08-17 PROCEDURE — 10002800 HB RX 250 W HCPCS: Performed by: EMERGENCY MEDICINE

## 2022-08-17 PROCEDURE — 80179 DRUG ASSAY SALICYLATE: CPT | Performed by: INTERNAL MEDICINE

## 2022-08-17 PROCEDURE — 10002800 HB RX 250 W HCPCS: Performed by: INTERNAL MEDICINE

## 2022-08-17 PROCEDURE — 96374 THER/PROPH/DIAG INJ IV PUSH: CPT

## 2022-08-17 PROCEDURE — 70450 CT HEAD/BRAIN W/O DYE: CPT

## 2022-08-17 PROCEDURE — 80048 BASIC METABOLIC PNL TOTAL CA: CPT | Performed by: INTERNAL MEDICINE

## 2022-08-17 PROCEDURE — 96375 TX/PRO/DX INJ NEW DRUG ADDON: CPT

## 2022-08-17 PROCEDURE — 10002807 HB RX 258: Performed by: INTERNAL MEDICINE

## 2022-08-17 PROCEDURE — 10002800 HB RX 250 W HCPCS

## 2022-08-17 PROCEDURE — 87086 URINE CULTURE/COLONY COUNT: CPT | Performed by: INTERNAL MEDICINE

## 2022-08-17 PROCEDURE — 71045 X-RAY EXAM CHEST 1 VIEW: CPT

## 2022-08-17 PROCEDURE — 10002801 HB RX 250 W/O HCPCS: Performed by: EMERGENCY MEDICINE

## 2022-08-17 PROCEDURE — 99285 EMERGENCY DEPT VISIT HI MDM: CPT

## 2022-08-17 PROCEDURE — G1004 CDSM NDSC: HCPCS

## 2022-08-17 PROCEDURE — 85610 PROTHROMBIN TIME: CPT | Performed by: EMERGENCY MEDICINE

## 2022-08-17 PROCEDURE — 93005 ELECTROCARDIOGRAM TRACING: CPT | Performed by: INTERNAL MEDICINE

## 2022-08-17 PROCEDURE — 80307 DRUG TEST PRSMV CHEM ANLYZR: CPT | Performed by: INTERNAL MEDICINE

## 2022-08-17 PROCEDURE — 82077 ASSAY SPEC XCP UR&BREATH IA: CPT | Performed by: EMERGENCY MEDICINE

## 2022-08-17 PROCEDURE — 96361 HYDRATE IV INFUSION ADD-ON: CPT

## 2022-08-17 PROCEDURE — 80053 COMPREHEN METABOLIC PANEL: CPT | Performed by: EMERGENCY MEDICINE

## 2022-08-17 PROCEDURE — 85025 COMPLETE CBC W/AUTO DIFF WBC: CPT | Performed by: EMERGENCY MEDICINE

## 2022-08-17 PROCEDURE — 82550 ASSAY OF CK (CPK): CPT | Performed by: INTERNAL MEDICINE

## 2022-08-17 RX ORDER — LORAZEPAM 2 MG/ML
2 INJECTION INTRAMUSCULAR ONCE
Status: COMPLETED | OUTPATIENT
Start: 2022-08-17 | End: 2022-08-17

## 2022-08-17 RX ORDER — LORAZEPAM 2 MG/ML
INJECTION INTRAMUSCULAR
Status: COMPLETED
Start: 2022-08-17 | End: 2022-08-17

## 2022-08-17 RX ORDER — THIAMINE HYDROCHLORIDE 100 MG/ML
100 INJECTION, SOLUTION INTRAMUSCULAR; INTRAVENOUS ONCE
Status: COMPLETED | OUTPATIENT
Start: 2022-08-17 | End: 2022-08-17

## 2022-08-17 RX ORDER — FOLIC ACID 5 MG/ML
1 INJECTION, SOLUTION INTRAMUSCULAR; INTRAVENOUS; SUBCUTANEOUS ONCE
Status: COMPLETED | OUTPATIENT
Start: 2022-08-17 | End: 2022-08-17

## 2022-08-17 RX ORDER — DROPERIDOL 2.5 MG/ML
INJECTION, SOLUTION INTRAMUSCULAR; INTRAVENOUS
Status: DISPENSED
Start: 2022-08-17 | End: 2022-08-18

## 2022-08-17 RX ORDER — DROPERIDOL 2.5 MG/ML
5 INJECTION, SOLUTION INTRAMUSCULAR; INTRAVENOUS ONCE
Status: DISCONTINUED | OUTPATIENT
Start: 2022-08-17 | End: 2022-08-17 | Stop reason: SDUPTHER

## 2022-08-17 RX ORDER — DROPERIDOL 2.5 MG/ML
5 INJECTION, SOLUTION INTRAMUSCULAR; INTRAVENOUS ONCE
Status: COMPLETED | OUTPATIENT
Start: 2022-08-17 | End: 2022-08-17

## 2022-08-17 RX ADMIN — LORAZEPAM 2 MG: 2 INJECTION INTRAMUSCULAR; INTRAVENOUS at 13:38

## 2022-08-17 RX ADMIN — SODIUM CHLORIDE, POTASSIUM CHLORIDE, SODIUM LACTATE AND CALCIUM CHLORIDE 1000 ML: 600; 310; 30; 20 INJECTION, SOLUTION INTRAVENOUS at 20:04

## 2022-08-17 RX ADMIN — THIAMINE HYDROCHLORIDE 100 MG: 100 INJECTION, SOLUTION INTRAMUSCULAR; INTRAVENOUS at 19:31

## 2022-08-17 RX ADMIN — SODIUM CHLORIDE, POTASSIUM CHLORIDE, SODIUM LACTATE AND CALCIUM CHLORIDE 1000 ML: 600; 310; 30; 20 INJECTION, SOLUTION INTRAVENOUS at 15:49

## 2022-08-17 RX ADMIN — FOLIC ACID 1 MG: 5 INJECTION, SOLUTION INTRAMUSCULAR; INTRAVENOUS; SUBCUTANEOUS at 19:31

## 2022-08-17 RX ADMIN — LORAZEPAM 2 MG: 2 INJECTION INTRAMUSCULAR; INTRAVENOUS at 12:51

## 2022-08-17 RX ADMIN — LORAZEPAM 2 MG: 2 INJECTION INTRAMUSCULAR at 13:38

## 2022-08-17 RX ADMIN — CEFAZOLIN SODIUM 1000 MG: 300 INJECTION, POWDER, LYOPHILIZED, FOR SOLUTION INTRAVENOUS at 19:32

## 2022-08-17 RX ADMIN — DROPERIDOL 5 MG: 2.5 INJECTION, SOLUTION INTRAMUSCULAR; INTRAVENOUS at 14:11

## 2022-08-17 RX ADMIN — SODIUM CHLORIDE 1000 ML: 9 INJECTION, SOLUTION INTRAVENOUS at 12:50

## 2022-08-17 ASSESSMENT — ENCOUNTER SYMPTOMS
WHEEZING: 0
SORE THROAT: 0
FEVER: 0
EYE ITCHING: 0
EYE DISCHARGE: 0
CONSTIPATION: 0
CHILLS: 0
ACTIVITY CHANGE: 0
TROUBLE SWALLOWING: 0
SINUS PRESSURE: 0
SHORTNESS OF BREATH: 0
BACK PAIN: 0
NAUSEA: 0
FATIGUE: 0
APPETITE CHANGE: 0
VOICE CHANGE: 0
LIGHT-HEADEDNESS: 0
VOMITING: 0
DIZZINESS: 0
COUGH: 0
DIAPHORESIS: 0
EYE PAIN: 0
BLOOD IN STOOL: 0
CONFUSION: 1
STRIDOR: 0
HEADACHES: 0
COLOR CHANGE: 0
SPEECH DIFFICULTY: 0
EYE REDNESS: 0
SINUS PAIN: 0
SEIZURES: 0
RECTAL PAIN: 0
CHEST TIGHTNESS: 0
FACIAL ASYMMETRY: 0
DIARRHEA: 0
NUMBNESS: 0
ABDOMINAL PAIN: 0
ABDOMINAL DISTENTION: 0
FACIAL SWELLING: 0
RHINORRHEA: 0
PHOTOPHOBIA: 0

## 2022-08-18 ENCOUNTER — HOSPITAL ENCOUNTER (EMERGENCY)
Age: 42
Discharge: HOME | End: 2022-08-18
Attending: EMERGENCY MEDICINE

## 2022-08-18 ENCOUNTER — HOSPITAL ENCOUNTER (EMERGENCY)
Age: 42
Discharge: HOME | End: 2022-08-19
Attending: STUDENT IN AN ORGANIZED HEALTH CARE EDUCATION/TRAINING PROGRAM

## 2022-08-18 VITALS
HEART RATE: 109 BPM | DIASTOLIC BLOOD PRESSURE: 71 MMHG | TEMPERATURE: 97.7 F | OXYGEN SATURATION: 99 % | RESPIRATION RATE: 16 BRPM | SYSTOLIC BLOOD PRESSURE: 110 MMHG

## 2022-08-18 VITALS
OXYGEN SATURATION: 99 % | SYSTOLIC BLOOD PRESSURE: 124 MMHG | HEART RATE: 130 BPM | TEMPERATURE: 97.5 F | RESPIRATION RATE: 14 BRPM | DIASTOLIC BLOOD PRESSURE: 84 MMHG

## 2022-08-18 DIAGNOSIS — F10.920 ACUTE ALCOHOLIC INTOXICATION WITHOUT COMPLICATION (CMD): Primary | ICD-10-CM

## 2022-08-18 DIAGNOSIS — F10.10 ALCOHOL ABUSE, EPISODIC: Primary | ICD-10-CM

## 2022-08-18 DIAGNOSIS — F10.929 ALCOHOLIC INTOXICATION WITH COMPLICATION (CMD): Primary | ICD-10-CM

## 2022-08-18 DIAGNOSIS — F41.9 ANXIETY: ICD-10-CM

## 2022-08-18 LAB
ATRIAL RATE (BPM): 88
BACTERIA UR CULT: NORMAL
ETHANOL SERPL-MCNC: 355 MG/DL
GLUCOSE BLDC GLUCOMTR-MCNC: 108 MG/DL (ref 70–99)
P AXIS (DEGREES): 78
PR-INTERVAL (MSEC): 134
QRS-INTERVAL (MSEC): 94
QT-INTERVAL (MSEC): 388
QTC: 469
R AXIS (DEGREES): 72
REPORT TEXT: NORMAL
T AXIS (DEGREES): 69
VENTRICULAR RATE EKG/MIN (BPM): 88

## 2022-08-18 PROCEDURE — 36415 COLL VENOUS BLD VENIPUNCTURE: CPT

## 2022-08-18 PROCEDURE — 99284 EMERGENCY DEPT VISIT MOD MDM: CPT

## 2022-08-18 PROCEDURE — 82962 GLUCOSE BLOOD TEST: CPT

## 2022-08-18 PROCEDURE — 99283 EMERGENCY DEPT VISIT LOW MDM: CPT | Performed by: EMERGENCY MEDICINE

## 2022-08-18 PROCEDURE — 82077 ASSAY SPEC XCP UR&BREATH IA: CPT | Performed by: EMERGENCY MEDICINE

## 2022-08-18 PROCEDURE — 99284 EMERGENCY DEPT VISIT MOD MDM: CPT | Performed by: EMERGENCY MEDICINE

## 2022-08-18 ASSESSMENT — PAIN SCALES - GENERAL
PAINLEVEL_OUTOF10: 0

## 2022-08-19 ENCOUNTER — HOSPITAL ENCOUNTER (EMERGENCY)
Age: 42
Discharge: HOME | End: 2022-08-20
Attending: STUDENT IN AN ORGANIZED HEALTH CARE EDUCATION/TRAINING PROGRAM

## 2022-08-19 ENCOUNTER — HOSPITAL ENCOUNTER (EMERGENCY)
Age: 42
Discharge: HOME | End: 2022-08-19
Attending: EMERGENCY MEDICINE

## 2022-08-19 VITALS
OXYGEN SATURATION: 99 % | TEMPERATURE: 98.6 F | SYSTOLIC BLOOD PRESSURE: 110 MMHG | DIASTOLIC BLOOD PRESSURE: 70 MMHG | RESPIRATION RATE: 16 BRPM | HEART RATE: 98 BPM

## 2022-08-19 VITALS
DIASTOLIC BLOOD PRESSURE: 98 MMHG | TEMPERATURE: 97.9 F | OXYGEN SATURATION: 100 % | RESPIRATION RATE: 16 BRPM | SYSTOLIC BLOOD PRESSURE: 142 MMHG | HEART RATE: 110 BPM

## 2022-08-19 DIAGNOSIS — F10.920 ALCOHOLIC INTOXICATION WITHOUT COMPLICATION (CMD): Primary | ICD-10-CM

## 2022-08-19 LAB
ALBUMIN SERPL-MCNC: 3.5 G/DL (ref 3.6–5.1)
ALBUMIN/GLOB SERPL: 1.1 {RATIO} (ref 1–2.4)
ALP SERPL-CCNC: 78 UNITS/L (ref 45–117)
ALT SERPL-CCNC: 41 UNITS/L
AMPHETAMINES UR QL SCN>500 NG/ML: NEGATIVE
ANION GAP SERPL CALC-SCNC: 16 MMOL/L (ref 7–19)
AST SERPL-CCNC: 35 UNITS/L
BARBITURATES UR QL SCN>200 NG/ML: NEGATIVE
BASOPHILS # BLD: 0.1 K/MCL (ref 0–0.3)
BASOPHILS NFR BLD: 1 %
BENZODIAZ UR QL SCN>200 NG/ML: POSITIVE
BILIRUB SERPL-MCNC: 0.2 MG/DL (ref 0.2–1)
BUN SERPL-MCNC: 4 MG/DL (ref 6–20)
BUN/CREAT SERPL: 9 (ref 7–25)
BZE UR QL SCN>150 NG/ML: NEGATIVE
CALCIUM SERPL-MCNC: 8.2 MG/DL (ref 8.4–10.2)
CANNABINOIDS UR QL SCN>50 NG/ML: NEGATIVE
CHLORIDE SERPL-SCNC: 105 MMOL/L (ref 97–110)
CO2 SERPL-SCNC: 25 MMOL/L (ref 21–32)
CREAT SERPL-MCNC: 0.43 MG/DL (ref 0.51–0.95)
DEPRECATED RDW RBC: 48.3 FL (ref 39–50)
EOSINOPHIL # BLD: 0.2 K/MCL (ref 0–0.5)
EOSINOPHIL NFR BLD: 3 %
ERYTHROCYTE [DISTWIDTH] IN BLOOD: 14.6 % (ref 11–15)
ETHANOL SERPL-MCNC: 227 MG/DL
FASTING DURATION TIME PATIENT: ABNORMAL H
GFR SERPLBLD BASED ON 1.73 SQ M-ARVRAT: >90 ML/MIN
GLOBULIN SER-MCNC: 3.3 G/DL (ref 2–4)
GLUCOSE SERPL-MCNC: 94 MG/DL (ref 70–99)
HCG UR QL: NEGATIVE
HCT VFR BLD CALC: 38.3 % (ref 36–46.5)
HGB BLD-MCNC: 13.1 G/DL (ref 12–15.5)
IMM GRANULOCYTES # BLD AUTO: 0 K/MCL (ref 0–0.2)
IMM GRANULOCYTES # BLD: 0 %
LYMPHOCYTES # BLD: 3.2 K/MCL (ref 1–4.8)
LYMPHOCYTES NFR BLD: 36 %
MCH RBC QN AUTO: 31 PG (ref 26–34)
MCHC RBC AUTO-ENTMCNC: 34.2 G/DL (ref 32–36.5)
MCV RBC AUTO: 90.8 FL (ref 78–100)
MONOCYTES # BLD: 0.4 K/MCL (ref 0.3–0.9)
MONOCYTES NFR BLD: 4 %
NEUTROPHILS # BLD: 5.1 K/MCL (ref 1.8–7.7)
NEUTROPHILS NFR BLD: 56 %
NRBC BLD MANUAL-RTO: 0 /100 WBC
OPIATES UR QL SCN>300 NG/ML: NEGATIVE
PCP UR QL SCN>25 NG/ML: NEGATIVE
PLATELET # BLD AUTO: 214 K/MCL (ref 140–450)
POTASSIUM SERPL-SCNC: 3.6 MMOL/L (ref 3.4–5.1)
PROT SERPL-MCNC: 6.8 G/DL (ref 6.4–8.2)
RBC # BLD: 4.22 MIL/MCL (ref 4–5.2)
SODIUM SERPL-SCNC: 142 MMOL/L (ref 135–145)
WBC # BLD: 8.9 K/MCL (ref 4.2–11)

## 2022-08-19 PROCEDURE — 10002803 HB RX 637: Performed by: EMERGENCY MEDICINE

## 2022-08-19 PROCEDURE — H2011 CRISIS INTERVEN SVC, 15 MIN: HCPCS | Performed by: COUNSELOR

## 2022-08-19 PROCEDURE — 80307 DRUG TEST PRSMV CHEM ANLYZR: CPT | Performed by: STUDENT IN AN ORGANIZED HEALTH CARE EDUCATION/TRAINING PROGRAM

## 2022-08-19 PROCEDURE — 80053 COMPREHEN METABOLIC PANEL: CPT | Performed by: STUDENT IN AN ORGANIZED HEALTH CARE EDUCATION/TRAINING PROGRAM

## 2022-08-19 PROCEDURE — 84703 CHORIONIC GONADOTROPIN ASSAY: CPT

## 2022-08-19 PROCEDURE — 99284 EMERGENCY DEPT VISIT MOD MDM: CPT | Performed by: STUDENT IN AN ORGANIZED HEALTH CARE EDUCATION/TRAINING PROGRAM

## 2022-08-19 PROCEDURE — 99284 EMERGENCY DEPT VISIT MOD MDM: CPT

## 2022-08-19 PROCEDURE — 10002803 HB RX 637: Performed by: STUDENT IN AN ORGANIZED HEALTH CARE EDUCATION/TRAINING PROGRAM

## 2022-08-19 PROCEDURE — 82077 ASSAY SPEC XCP UR&BREATH IA: CPT | Performed by: STUDENT IN AN ORGANIZED HEALTH CARE EDUCATION/TRAINING PROGRAM

## 2022-08-19 PROCEDURE — 85025 COMPLETE CBC W/AUTO DIFF WBC: CPT | Performed by: STUDENT IN AN ORGANIZED HEALTH CARE EDUCATION/TRAINING PROGRAM

## 2022-08-19 PROCEDURE — 99283 EMERGENCY DEPT VISIT LOW MDM: CPT | Performed by: EMERGENCY MEDICINE

## 2022-08-19 RX ORDER — GABAPENTIN 300 MG/1
300 CAPSULE ORAL ONCE
Status: COMPLETED | OUTPATIENT
Start: 2022-08-19 | End: 2022-08-20

## 2022-08-19 RX ORDER — LORAZEPAM 1 MG/1
1 TABLET ORAL ONCE
Status: COMPLETED | OUTPATIENT
Start: 2022-08-19 | End: 2022-08-19

## 2022-08-19 RX ADMIN — LORAZEPAM 1 MG: 1 TABLET ORAL at 18:05

## 2022-08-19 RX ADMIN — TRAZODONE HYDROCHLORIDE 150 MG: 100 TABLET ORAL at 01:27

## 2022-08-19 ASSESSMENT — COGNITIVE AND FUNCTIONAL STATUS - GENERAL
ATTENTION_CALCULATED: MAINTAINS ATTENTION
MOOD: ANXIOUS
SPEECH: CLEAR/UNDERSTANDABLE
ORIENTATION: ORIENTED (PERSON/PLACE/TIME)
BEHAVIOR: APPROPRIATE TO SITUATION
PERCEPTUAL_MISINTERPRETATIONS_HALLUCINATIONS: CLEAR REALITY BASED PERCEPTIONS
MOTOR_BEHAVIOR-RETARDATION_CALCULATED: CALM AND PURPOSEFUL
MEMORY: INTACT
LEVEL_OF_CONSCIOUSNESS_CALCULATED: ALERT
MOTOR_BEHAVIOR-AGITATION_CALCULATED: CALM AND PURPOSEFUL
AFFECT: ANXIOUS

## 2022-08-19 ASSESSMENT — ENCOUNTER SYMPTOMS
LIGHT-HEADEDNESS: 0
AGITATION: 0
SHORTNESS OF BREATH: 0
EYE DISCHARGE: 0
VOMITING: 0
ABDOMINAL PAIN: 0
HEADACHES: 0
DIARRHEA: 0
CHILLS: 0
COUGH: 0
FEVER: 0

## 2022-08-19 ASSESSMENT — PAIN SCALES - GENERAL: PAINLEVEL_OUTOF10: 0

## 2022-08-20 ENCOUNTER — HOSPITAL ENCOUNTER (EMERGENCY)
Age: 42
Discharge: HOME | End: 2022-08-20
Attending: EMERGENCY MEDICINE

## 2022-08-20 VITALS
HEART RATE: 103 BPM | RESPIRATION RATE: 18 BRPM | DIASTOLIC BLOOD PRESSURE: 87 MMHG | TEMPERATURE: 98.1 F | OXYGEN SATURATION: 100 % | SYSTOLIC BLOOD PRESSURE: 134 MMHG

## 2022-08-20 VITALS
SYSTOLIC BLOOD PRESSURE: 114 MMHG | DIASTOLIC BLOOD PRESSURE: 71 MMHG | TEMPERATURE: 98.2 F | OXYGEN SATURATION: 97 % | HEART RATE: 93 BPM | RESPIRATION RATE: 16 BRPM

## 2022-08-20 DIAGNOSIS — F10.920 ALCOHOLIC INTOXICATION WITHOUT COMPLICATION (CMD): Primary | ICD-10-CM

## 2022-08-20 DIAGNOSIS — F10.20 ALCOHOLISM (CMD): ICD-10-CM

## 2022-08-20 LAB — GLUCOSE BLDC GLUCOMTR-MCNC: 104 MG/DL (ref 70–99)

## 2022-08-20 PROCEDURE — 10002803 HB RX 637: Performed by: STUDENT IN AN ORGANIZED HEALTH CARE EDUCATION/TRAINING PROGRAM

## 2022-08-20 PROCEDURE — 99282 EMERGENCY DEPT VISIT SF MDM: CPT | Performed by: EMERGENCY MEDICINE

## 2022-08-20 PROCEDURE — 99284 EMERGENCY DEPT VISIT MOD MDM: CPT

## 2022-08-20 PROCEDURE — 82962 GLUCOSE BLOOD TEST: CPT

## 2022-08-20 RX ADMIN — GABAPENTIN 300 MG: 300 CAPSULE ORAL at 00:15

## 2022-08-20 ASSESSMENT — ENCOUNTER SYMPTOMS
VOMITING: 0
CHILLS: 0
STRIDOR: 0
FACIAL SWELLING: 0
RHINORRHEA: 0
WHEEZING: 0
FEVER: 0
EYE PAIN: 0
VOICE CHANGE: 0
EYE ITCHING: 0
COUGH: 0
SORE THROAT: 0
APPETITE CHANGE: 0
RECTAL PAIN: 0
BACK PAIN: 0
NAUSEA: 0
DIAPHORESIS: 0
NUMBNESS: 0
SHORTNESS OF BREATH: 0
DIARRHEA: 0
FACIAL ASYMMETRY: 0
SPEECH DIFFICULTY: 0
DIZZINESS: 0
CONSTIPATION: 0
EYE DISCHARGE: 0
HEADACHES: 0
SEIZURES: 0
COLOR CHANGE: 0
BLOOD IN STOOL: 0
SINUS PAIN: 0
FATIGUE: 0
ACTIVITY CHANGE: 0
CHEST TIGHTNESS: 0
TROUBLE SWALLOWING: 0
LIGHT-HEADEDNESS: 0
EYE REDNESS: 0
ABDOMINAL DISTENTION: 0
SINUS PRESSURE: 0
PHOTOPHOBIA: 0
ABDOMINAL PAIN: 0

## 2022-08-20 ASSESSMENT — PAIN SCALES - GENERAL: PAINLEVEL_OUTOF10: 0

## 2022-08-26 ENCOUNTER — TELEPHONE (OUTPATIENT)
Dept: NEUROLOGY | Age: 42
End: 2022-08-26

## 2022-08-30 ENCOUNTER — HOSPITAL ENCOUNTER (EMERGENCY)
Age: 42
Discharge: HOME | End: 2022-08-30
Attending: EMERGENCY MEDICINE

## 2022-08-30 ENCOUNTER — APPOINTMENT (OUTPATIENT)
Dept: CT IMAGING | Age: 42
End: 2022-08-30
Attending: EMERGENCY MEDICINE

## 2022-08-30 ENCOUNTER — APPOINTMENT (OUTPATIENT)
Dept: GENERAL RADIOLOGY | Age: 42
End: 2022-08-30
Attending: EMERGENCY MEDICINE

## 2022-08-30 VITALS
HEART RATE: 98 BPM | SYSTOLIC BLOOD PRESSURE: 141 MMHG | RESPIRATION RATE: 18 BRPM | OXYGEN SATURATION: 97 % | DIASTOLIC BLOOD PRESSURE: 106 MMHG | TEMPERATURE: 98.3 F

## 2022-08-30 DIAGNOSIS — S60.221A CONTUSION OF RIGHT HAND, INITIAL ENCOUNTER: ICD-10-CM
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
## 2022-08-30 DIAGNOSIS — F10.20 ALCOHOLISM (CMD): ICD-10-CM

## 2022-08-30 DIAGNOSIS — S09.90XA CLOSED HEAD INJURY, INITIAL ENCOUNTER: Primary | ICD-10-CM

## 2022-08-30 LAB — GLUCOSE BLDC GLUCOMTR-MCNC: 79 MG/DL (ref 70–99)

## 2022-08-30 PROCEDURE — 70486 CT MAXILLOFACIAL W/O DYE: CPT

## 2022-08-30 PROCEDURE — 99284 EMERGENCY DEPT VISIT MOD MDM: CPT

## 2022-08-30 PROCEDURE — 82962 GLUCOSE BLOOD TEST: CPT

## 2022-08-30 PROCEDURE — G1004 CDSM NDSC: HCPCS

## 2022-08-30 PROCEDURE — 70450 CT HEAD/BRAIN W/O DYE: CPT

## 2022-08-30 PROCEDURE — 99284 EMERGENCY DEPT VISIT MOD MDM: CPT | Performed by: EMERGENCY MEDICINE

## 2022-08-30 PROCEDURE — 73130 X-RAY EXAM OF HAND: CPT

## 2022-08-30 PROCEDURE — 72125 CT NECK SPINE W/O DYE: CPT

## 2022-08-30 RX ORDER — METOCLOPRAMIDE HYDROCHLORIDE 5 MG/ML
10 INJECTION INTRAMUSCULAR; INTRAVENOUS ONCE
Status: DISCONTINUED | OUTPATIENT
Start: 2022-08-30 | End: 2022-08-30

## 2022-08-30 RX ORDER — DIPHENHYDRAMINE HYDROCHLORIDE 50 MG/ML
25 INJECTION INTRAMUSCULAR; INTRAVENOUS ONCE
Status: DISCONTINUED | OUTPATIENT
Start: 2022-08-30 | End: 2022-08-30

## 2022-08-30 ASSESSMENT — ENCOUNTER SYMPTOMS
WOUND: 1
ABDOMINAL PAIN: 0
VOICE CHANGE: 0
HEADACHES: 0
NUMBNESS: 0
APPETITE CHANGE: 0
CHEST TIGHTNESS: 0
DIAPHORESIS: 0
EYE DISCHARGE: 0
VOMITING: 0
WHEEZING: 0
COLOR CHANGE: 0
DIARRHEA: 0
TROUBLE SWALLOWING: 0
FACIAL SWELLING: 0
SINUS PAIN: 0
SINUS PRESSURE: 0
PHOTOPHOBIA: 0
ACTIVITY CHANGE: 0
RHINORRHEA: 0
EYE ITCHING: 0
EYE REDNESS: 0
SORE THROAT: 0
LIGHT-HEADEDNESS: 0
FATIGUE: 0
CONSTIPATION: 0
SEIZURES: 0
SPEECH DIFFICULTY: 0
COUGH: 0
DIZZINESS: 0
ABDOMINAL DISTENTION: 0
BLOOD IN STOOL: 0
SHORTNESS OF BREATH: 0
CHILLS: 0
STRIDOR: 0
RECTAL PAIN: 0
BACK PAIN: 0
NAUSEA: 0
EYE PAIN: 0
FEVER: 0
FACIAL ASYMMETRY: 0

## 2022-08-30 ASSESSMENT — PAIN SCALES - GENERAL: PAINLEVEL_OUTOF10: 0

## 2022-09-21 ENCOUNTER — HOSPITAL ENCOUNTER (EMERGENCY)
Age: 42
Discharge: HOME | End: 2022-09-21
Attending: EMERGENCY MEDICINE

## 2022-09-21 VITALS
RESPIRATION RATE: 18 BRPM | SYSTOLIC BLOOD PRESSURE: 131 MMHG | TEMPERATURE: 98.4 F | DIASTOLIC BLOOD PRESSURE: 87 MMHG | OXYGEN SATURATION: 96 % | HEART RATE: 94 BPM

## 2022-09-21 DIAGNOSIS — F10.10 ALCOHOL ABUSE, EPISODIC: Primary | ICD-10-CM

## 2022-09-21 PROCEDURE — 99283 EMERGENCY DEPT VISIT LOW MDM: CPT

## 2022-09-21 PROCEDURE — 99283 EMERGENCY DEPT VISIT LOW MDM: CPT | Performed by: EMERGENCY MEDICINE

## 2022-09-21 PROCEDURE — 10002803 HB RX 637: Performed by: EMERGENCY MEDICINE

## 2022-09-21 RX ORDER — FOLIC ACID 1 MG/1
1 TABLET ORAL ONCE
Status: COMPLETED | OUTPATIENT
Start: 2022-09-21 | End: 2022-09-21

## 2022-09-21 RX ADMIN — THIAMINE HCL TAB 100 MG 100 MG: 100 TAB at 11:01

## 2022-09-21 RX ADMIN — FOLIC ACID 1 MG: 1 TABLET ORAL at 11:01

## 2022-09-21 ASSESSMENT — LIFESTYLE VARIABLES
PAROXYSMAL SWEATS: NO SWEAT VISIBLE
VISUAL DISTURBANCES: NOT PRESENT
HEADACHE, FULLNESS IN HEAD: NOT PRESENT
ANXIETY: NO ANXIETY, AT EASE
AGITATION: NORMAL ACTIVITY
NAUSEA AND VOMITING: NO NAUSEA AND NO VOMITING
TREMOR: NO TREMOR
AUDITORY DISTURBANCES: NOT PRESENT

## 2022-09-21 ASSESSMENT — PAIN SCALES - GENERAL: PAINLEVEL_OUTOF10: 0

## 2022-09-22 ENCOUNTER — EXTERNAL RECORD (OUTPATIENT)
Dept: HEALTH INFORMATION MANAGEMENT | Facility: OTHER | Age: 42
End: 2022-09-22

## 2022-09-26 ENCOUNTER — EXTERNAL RECORD (OUTPATIENT)
Dept: HEALTH INFORMATION MANAGEMENT | Facility: OTHER | Age: 42
End: 2022-09-26

## 2022-10-05 ENCOUNTER — CLINICAL ABSTRACT (OUTPATIENT)
Dept: HEALTH INFORMATION MANAGEMENT | Facility: OTHER | Age: 42
End: 2022-10-05

## 2022-10-14 ENCOUNTER — APPOINTMENT (OUTPATIENT)
Dept: NEUROLOGY | Age: 42
End: 2022-10-14

## 2022-10-25 ENCOUNTER — APPOINTMENT (OUTPATIENT)
Dept: NEUROLOGY | Age: 42
End: 2022-10-25

## 2022-11-07 ENCOUNTER — TELEPHONE (OUTPATIENT)
Dept: SCHEDULING | Age: 42
End: 2022-11-07

## 2022-11-14 ENCOUNTER — OFFICE VISIT (OUTPATIENT)
Dept: FAMILY MEDICINE CLINIC | Facility: CLINIC | Age: 42
End: 2022-11-14
Payer: MEDICAID

## 2022-11-14 VITALS
HEART RATE: 73 BPM | WEIGHT: 163 LBS | RESPIRATION RATE: 16 BRPM | DIASTOLIC BLOOD PRESSURE: 67 MMHG | HEIGHT: 63 IN | TEMPERATURE: 97 F | BODY MASS INDEX: 28.88 KG/M2 | SYSTOLIC BLOOD PRESSURE: 111 MMHG

## 2022-11-14 DIAGNOSIS — F32.A ANXIETY AND DEPRESSION: ICD-10-CM

## 2022-11-14 DIAGNOSIS — F10.21 HISTORY OF ALCOHOL DEPENDENCE (HCC): ICD-10-CM

## 2022-11-14 DIAGNOSIS — M54.2 NECK PAIN: ICD-10-CM

## 2022-11-14 DIAGNOSIS — Z87.81 HISTORY OF FRACTURE OF ARM: Primary | ICD-10-CM

## 2022-11-14 DIAGNOSIS — F41.9 ANXIETY AND DEPRESSION: ICD-10-CM

## 2022-11-14 PROCEDURE — 3008F BODY MASS INDEX DOCD: CPT | Performed by: FAMILY MEDICINE

## 2022-11-14 PROCEDURE — 3078F DIAST BP <80 MM HG: CPT | Performed by: FAMILY MEDICINE

## 2022-11-14 PROCEDURE — 99202 OFFICE O/P NEW SF 15 MIN: CPT | Performed by: FAMILY MEDICINE

## 2022-11-14 PROCEDURE — 3074F SYST BP LT 130 MM HG: CPT | Performed by: FAMILY MEDICINE

## 2022-11-14 RX ORDER — GABAPENTIN 300 MG/1
300 CAPSULE ORAL
COMMUNITY
Start: 2022-02-10 | End: 2022-11-14

## 2022-11-14 RX ORDER — BACLOFEN 20 MG/1
20 TABLET ORAL
COMMUNITY
Start: 2022-05-03

## 2022-11-14 RX ORDER — TRAZODONE HYDROCHLORIDE 150 MG/1
150 TABLET ORAL NIGHTLY
Qty: 30 TABLET | Refills: 2 | Status: SHIPPED | OUTPATIENT
Start: 2022-11-14

## 2022-11-14 RX ORDER — GABAPENTIN 300 MG/1
300 CAPSULE ORAL 3 TIMES DAILY
Qty: 90 CAPSULE | Refills: 2 | Status: SHIPPED | OUTPATIENT
Start: 2022-11-14

## 2022-11-14 RX ORDER — SERTRALINE HYDROCHLORIDE 100 MG/1
150 TABLET, FILM COATED ORAL DAILY
Qty: 45 TABLET | Refills: 2 | Status: SHIPPED | OUTPATIENT
Start: 2022-11-14

## 2022-11-14 RX ORDER — TRAZODONE HYDROCHLORIDE 150 MG/1
150 TABLET ORAL NIGHTLY
COMMUNITY
Start: 2022-02-10 | End: 2022-11-14

## 2022-11-16 ENCOUNTER — TELEPHONE (OUTPATIENT)
Dept: FAMILY MEDICINE CLINIC | Facility: CLINIC | Age: 42
End: 2022-11-16

## 2022-11-21 ENCOUNTER — OFFICE VISIT (OUTPATIENT)
Dept: INTERNAL MEDICINE | Age: 42
End: 2022-11-21

## 2022-11-21 ENCOUNTER — LAB SERVICES (OUTPATIENT)
Dept: LAB | Age: 42
End: 2022-11-21

## 2022-11-21 VITALS
BODY MASS INDEX: 27.29 KG/M2 | RESPIRATION RATE: 18 BRPM | HEART RATE: 63 BPM | OXYGEN SATURATION: 95 % | DIASTOLIC BLOOD PRESSURE: 70 MMHG | TEMPERATURE: 98.4 F | SYSTOLIC BLOOD PRESSURE: 111 MMHG | WEIGHT: 163.8 LBS | HEIGHT: 65 IN

## 2022-11-21 DIAGNOSIS — Z00.00 ENCOUNTER FOR ROUTINE ADULT HEALTH EXAMINATION WITHOUT ABNORMAL FINDINGS: ICD-10-CM

## 2022-11-21 DIAGNOSIS — R87.810 CERVICAL HIGH RISK HPV (HUMAN PAPILLOMAVIRUS) TEST POSITIVE: ICD-10-CM

## 2022-11-21 DIAGNOSIS — Z23 NEEDS FLU SHOT: Primary | ICD-10-CM

## 2022-11-21 LAB — HIV 1+2 AB+HIV1 P24 AG SERPL QL IA: NONREACTIVE

## 2022-11-21 PROCEDURE — 3074F SYST BP LT 130 MM HG: CPT | Performed by: STUDENT IN AN ORGANIZED HEALTH CARE EDUCATION/TRAINING PROGRAM

## 2022-11-21 PROCEDURE — 87389 HIV-1 AG W/HIV-1&-2 AB AG IA: CPT | Performed by: INTERNAL MEDICINE

## 2022-11-21 PROCEDURE — 86592 SYPHILIS TEST NON-TREP QUAL: CPT | Performed by: INTERNAL MEDICINE

## 2022-11-21 PROCEDURE — 90686 IIV4 VACC NO PRSV 0.5 ML IM: CPT | Performed by: INTERNAL MEDICINE

## 2022-11-21 PROCEDURE — 99213 OFFICE O/P EST LOW 20 MIN: CPT | Performed by: STUDENT IN AN ORGANIZED HEALTH CARE EDUCATION/TRAINING PROGRAM

## 2022-11-21 PROCEDURE — 36415 COLL VENOUS BLD VENIPUNCTURE: CPT | Performed by: INTERNAL MEDICINE

## 2022-11-21 PROCEDURE — 3078F DIAST BP <80 MM HG: CPT | Performed by: STUDENT IN AN ORGANIZED HEALTH CARE EDUCATION/TRAINING PROGRAM

## 2022-11-21 RX ORDER — TRAZODONE HYDROCHLORIDE 50 MG/1
150 TABLET ORAL
COMMUNITY
Start: 2022-09-06 | End: 2023-01-19 | Stop reason: SDUPTHER

## 2022-11-21 RX ORDER — TRAZODONE HYDROCHLORIDE 50 MG/1
TABLET ORAL
COMMUNITY
Start: 2022-10-04 | End: 2023-01-10 | Stop reason: DRUGHIGH

## 2022-11-21 RX ORDER — SERTRALINE HYDROCHLORIDE 150 MG/1
150 CAPSULE ORAL
Status: ON HOLD | COMMUNITY
Start: 2022-09-06 | End: 2023-02-27

## 2022-11-21 RX ORDER — BACLOFEN 20 MG/1
20 TABLET ORAL
COMMUNITY
Start: 2022-09-04 | End: 2023-01-12 | Stop reason: CLARIF

## 2022-11-21 RX ORDER — DOXYCYCLINE HYCLATE 100 MG/1
CAPSULE ORAL
COMMUNITY
Start: 2022-08-17 | End: 2023-01-10 | Stop reason: CLARIF

## 2022-11-21 RX ORDER — EMTRICITABINE AND TENOFOVIR DISOPROXIL FUMARATE 200; 300 MG/1; MG/1
1 TABLET, FILM COATED ORAL DAILY
COMMUNITY
Start: 2022-08-17 | End: 2023-01-10

## 2022-11-21 RX ORDER — LEVETIRACETAM 500 MG/1
500 TABLET ORAL
COMMUNITY
Start: 2022-08-12 | End: 2023-01-19 | Stop reason: SDUPTHER

## 2022-11-21 RX ORDER — VANCOMYCIN HYDROCHLORIDE 125 MG/1
CAPSULE ORAL
COMMUNITY
Start: 2022-10-11 | End: 2023-01-10 | Stop reason: CLARIF

## 2022-11-21 RX ORDER — BACLOFEN 10 MG/1
10 TABLET ORAL 3 TIMES DAILY
COMMUNITY
Start: 2022-09-23 | End: 2023-01-12 | Stop reason: CLARIF

## 2022-11-21 RX ORDER — SERTRALINE HYDROCHLORIDE 100 MG/1
150 TABLET, FILM COATED ORAL
COMMUNITY
Start: 2022-11-14 | End: 2023-01-10 | Stop reason: DRUGHIGH

## 2022-11-21 RX ORDER — FOLIC ACID 1 MG/1
TABLET ORAL
COMMUNITY
Start: 2022-10-27 | End: 2023-01-10 | Stop reason: CLARIF

## 2022-11-21 RX ORDER — SERTRALINE HYDROCHLORIDE 100 MG/1
TABLET, FILM COATED ORAL
COMMUNITY
Start: 2022-11-14 | End: 2023-01-10 | Stop reason: DRUGHIGH

## 2022-11-21 RX ORDER — LORAZEPAM 1 MG/1
TABLET ORAL
COMMUNITY
Start: 2022-10-04 | End: 2023-01-10 | Stop reason: CLARIF

## 2022-11-21 RX ORDER — CHLORDIAZEPOXIDE HYDROCHLORIDE 25 MG/1
25 CAPSULE, GELATIN COATED ORAL 4 TIMES DAILY PRN
COMMUNITY
Start: 2022-08-14 | End: 2023-01-10 | Stop reason: CLARIF

## 2022-11-21 RX ORDER — RALTEGRAVIR 400 MG/1
TABLET, FILM COATED ORAL
COMMUNITY
Start: 2022-08-29 | End: 2023-01-10 | Stop reason: CLARIF

## 2022-11-21 RX ORDER — GABAPENTIN 300 MG/1
300 CAPSULE ORAL
COMMUNITY
Start: 2022-09-04 | End: 2023-01-19 | Stop reason: SDUPTHER

## 2022-11-21 ASSESSMENT — PATIENT HEALTH QUESTIONNAIRE - PHQ9
2. FEELING DOWN, DEPRESSED OR HOPELESS: NOT AT ALL
CLINICAL INTERPRETATION OF PHQ2 SCORE: NO FURTHER SCREENING NEEDED
SUM OF ALL RESPONSES TO PHQ9 QUESTIONS 1 AND 2: 0
1. LITTLE INTEREST OR PLEASURE IN DOING THINGS: NOT AT ALL
SUM OF ALL RESPONSES TO PHQ9 QUESTIONS 1 AND 2: 0

## 2022-11-21 ASSESSMENT — PAIN SCALES - GENERAL: PAINLEVEL: 0

## 2022-11-22 LAB — RPR SER QL: NONREACTIVE

## 2022-12-09 ENCOUNTER — HOSPITAL ENCOUNTER (OUTPATIENT)
Age: 42
Discharge: HOME | End: 2022-12-09
Payer: MEDICAID

## 2022-12-09 VITALS
SYSTOLIC BLOOD PRESSURE: 117 MMHG | HEART RATE: 82 BPM | OXYGEN SATURATION: 96 % | RESPIRATION RATE: 18 BRPM | TEMPERATURE: 98 F | DIASTOLIC BLOOD PRESSURE: 68 MMHG

## 2022-12-09 DIAGNOSIS — L24.89 IRRITANT CONTACT DERMATITIS DUE TO OTHER AGENTS: Primary | ICD-10-CM

## 2022-12-09 PROCEDURE — 99213 OFFICE O/P EST LOW 20 MIN: CPT

## 2022-12-09 RX ORDER — EMTRICITABINE AND TENOFOVIR DISOPROXIL FUMARATE 200; 300 MG/1; MG/1
1 TABLET, FILM COATED ORAL DAILY
COMMUNITY
Start: 2022-08-17

## 2022-12-09 RX ORDER — SERTRALINE HYDROCHLORIDE 150 MG/1
CAPSULE ORAL
COMMUNITY
Start: 2022-08-17

## 2022-12-09 RX ORDER — CHLORDIAZEPOXIDE HYDROCHLORIDE 25 MG/1
25 CAPSULE, GELATIN COATED ORAL
COMMUNITY
Start: 2022-01-20

## 2022-12-09 RX ORDER — NORETHINDRONE ACETATE AND ETHINYL ESTRADIOL 1.5; 3 MG/1; UG/1
TABLET ORAL
COMMUNITY
Start: 2022-07-20

## 2022-12-09 RX ORDER — LORAZEPAM 1 MG/1
TABLET ORAL
COMMUNITY
Start: 2022-10-04

## 2022-12-09 RX ORDER — RALTEGRAVIR 400 MG/1
TABLET, FILM COATED ORAL
COMMUNITY
Start: 2022-08-29

## 2022-12-09 NOTE — ED INITIAL ASSESSMENT (HPI)
PATIENT ARRIVED AMBULATORY TO ROOM C/O A RASH TO THE LEFT FOREARM. 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  PATIENT STATES SHE BELIEVES SHE IS HAVING A REACTION TO THE

## 2022-12-09 NOTE — DISCHARGE INSTRUCTIONS
Please use the topical hydrocortisone cream as prescribed. If the rash gets worse or does not improve with this treatment please follow-up with your primary care doctor. If you develop any throat pain, shortness of breath, worsening or concerning symptoms you should go to the emergency department. If the rash does not resolve within the next few days you should follow-up with a dermatologist.  Otherwise follow-up with your primary care doctor.

## 2022-12-15 ENCOUNTER — TELEPHONE (OUTPATIENT)
Dept: OBGYN | Age: 42
End: 2022-12-15

## 2022-12-29 ENCOUNTER — APPOINTMENT (OUTPATIENT)
Dept: OBGYN | Age: 42
End: 2022-12-29

## 2023-01-10 ENCOUNTER — OFFICE VISIT (OUTPATIENT)
Dept: OBGYN | Age: 43
End: 2023-01-10

## 2023-01-10 VITALS
WEIGHT: 163.14 LBS | TEMPERATURE: 99 F | DIASTOLIC BLOOD PRESSURE: 70 MMHG | HEART RATE: 67 BPM | HEIGHT: 63 IN | BODY MASS INDEX: 28.91 KG/M2 | SYSTOLIC BLOOD PRESSURE: 115 MMHG | RESPIRATION RATE: 12 BRPM

## 2023-01-10 DIAGNOSIS — Z23 NEED FOR HPV VACCINE: ICD-10-CM

## 2023-01-10 DIAGNOSIS — Z01.419 GYNECOLOGIC EXAM NORMAL: Primary | ICD-10-CM

## 2023-01-10 PROCEDURE — 90651 9VHPV VACCINE 2/3 DOSE IM: CPT | Performed by: OBSTETRICS & GYNECOLOGY

## 2023-01-10 PROCEDURE — 3078F DIAST BP <80 MM HG: CPT

## 2023-01-10 PROCEDURE — 87661 TRICHOMONAS VAGINALIS AMPLIF: CPT | Performed by: INTERNAL MEDICINE

## 2023-01-10 PROCEDURE — 87591 N.GONORRHOEAE DNA AMP PROB: CPT | Performed by: INTERNAL MEDICINE

## 2023-01-10 PROCEDURE — 87491 CHLMYD TRACH DNA AMP PROBE: CPT | Performed by: INTERNAL MEDICINE

## 2023-01-10 PROCEDURE — 88142 CYTOPATH C/V THIN LAYER: CPT

## 2023-01-10 PROCEDURE — 87624 HPV HI-RISK TYP POOLED RSLT: CPT | Performed by: INTERNAL MEDICINE

## 2023-01-10 PROCEDURE — 3074F SYST BP LT 130 MM HG: CPT

## 2023-01-10 PROCEDURE — 88175 CYTOPATH C/V AUTO FLUID REDO: CPT | Performed by: INTERNAL MEDICINE

## 2023-01-10 PROCEDURE — 99396 PREV VISIT EST AGE 40-64: CPT

## 2023-01-10 ASSESSMENT — PAIN SCALES - GENERAL: PAINLEVEL: 0

## 2023-01-11 LAB
C TRACH RRNA SPEC QL NAA+PROBE: NEGATIVE
Lab: NORMAL
N GONORRHOEA RRNA SPEC QL NAA+PROBE: NEGATIVE
T VAGINALIS RRNA SPEC QL NAA+PROBE: POSITIVE

## 2023-01-12 ENCOUNTER — TELEPHONE (OUTPATIENT)
Dept: SCHEDULING | Age: 43
End: 2023-01-12

## 2023-01-12 RX ORDER — BACLOFEN 20 MG/1
TABLET ORAL
Qty: 120 TABLET | Refills: 0 | Status: SHIPPED | OUTPATIENT
Start: 2023-01-12 | End: 2023-07-19 | Stop reason: DRUGHIGH

## 2023-01-13 LAB
CASE RPRT: NORMAL
CLINICAL INFO: NORMAL
CYTOLOGY CVX/VAG STUDY: NORMAL
CYTOLOGY CVX/VAG STUDY: NORMAL
HPV16+18+45 E6+E7MRNA CVX NAA+PROBE: NEGATIVE
Lab: NORMAL
PAP EDUCATIONAL NOTE: NORMAL
PATH REPORT.ADDENDUM SPEC: NORMAL
SPECIMEN ADEQUACY: NORMAL

## 2023-01-16 ENCOUNTER — TELEPHONE (OUTPATIENT)
Dept: OBGYN | Age: 43
End: 2023-01-16

## 2023-01-16 RX ORDER — METRONIDAZOLE 500 MG/1
500 TABLET ORAL 2 TIMES DAILY
Qty: 14 TABLET | Refills: 0 | Status: SHIPPED | OUTPATIENT
Start: 2023-01-16 | End: 2023-01-23

## 2023-01-17 RX ORDER — TRAZODONE HYDROCHLORIDE 150 MG/1
TABLET ORAL
Qty: 30 TABLET | Refills: 5 | Status: SHIPPED | OUTPATIENT
Start: 2023-01-17

## 2023-01-17 RX ORDER — SERTRALINE HYDROCHLORIDE 100 MG/1
TABLET, FILM COATED ORAL
Qty: 45 TABLET | Refills: 5 | Status: SHIPPED | OUTPATIENT
Start: 2023-01-17

## 2023-01-17 NOTE — TELEPHONE ENCOUNTER
Message noted: Chart reviewed and may refill medication as requested. Prescription sent to listed pharmacy. Pharmacy to notify patient.  Pt notified through [facility]

## 2023-01-19 ENCOUNTER — OFFICE VISIT (OUTPATIENT)
Dept: NEUROLOGY | Age: 43
End: 2023-01-19

## 2023-01-19 VITALS
HEIGHT: 63 IN | SYSTOLIC BLOOD PRESSURE: 130 MMHG | HEART RATE: 73 BPM | RESPIRATION RATE: 16 BRPM | BODY MASS INDEX: 28.88 KG/M2 | DIASTOLIC BLOOD PRESSURE: 80 MMHG | WEIGHT: 163 LBS

## 2023-01-19 DIAGNOSIS — R56.9 SEIZURES (CMD): Primary | ICD-10-CM

## 2023-01-19 DIAGNOSIS — G24.3 TORTICOLLIS, SPASMODIC: ICD-10-CM

## 2023-01-19 PROCEDURE — 3075F SYST BP GE 130 - 139MM HG: CPT | Performed by: PSYCHIATRY & NEUROLOGY

## 2023-01-19 PROCEDURE — 3079F DIAST BP 80-89 MM HG: CPT | Performed by: PSYCHIATRY & NEUROLOGY

## 2023-01-19 PROCEDURE — 99214 OFFICE O/P EST MOD 30 MIN: CPT | Performed by: PSYCHIATRY & NEUROLOGY

## 2023-01-19 RX ORDER — LEVETIRACETAM 500 MG/1
500 TABLET ORAL 2 TIMES DAILY
Qty: 180 TABLET | Refills: 3 | Status: SHIPPED | OUTPATIENT
Start: 2023-01-19 | End: 2023-07-19 | Stop reason: SDUPTHER

## 2023-01-23 ENCOUNTER — HOSPITAL ENCOUNTER (OUTPATIENT)
Dept: MAMMOGRAPHY | Age: 43
Discharge: HOME | End: 2023-01-23
Attending: OBSTETRICS & GYNECOLOGY

## 2023-01-23 DIAGNOSIS — Z01.419 GYNECOLOGIC EXAM NORMAL: ICD-10-CM

## 2023-01-23 PROCEDURE — 77063 BREAST TOMOSYNTHESIS BI: CPT

## 2023-02-05 PROBLEM — F33.2 SEVERE EPISODE OF RECURRENT MAJOR DEPRESSIVE DISORDER, WITHOUT PSYCHOTIC FEATURES (HCC): Status: ACTIVE | Noted: 2023-02-05

## 2023-02-05 PROBLEM — R45.851 SUICIDAL IDEATION: Status: ACTIVE | Noted: 2023-02-05

## 2023-02-05 PROBLEM — F10.930 ALCOHOL WITHDRAWAL SYNDROME WITHOUT COMPLICATION (HCC): Status: ACTIVE | Noted: 2023-02-05

## 2023-02-05 PROBLEM — F10.920 ALCOHOLIC INTOXICATION WITHOUT COMPLICATION (HCC): Status: ACTIVE | Noted: 2023-02-05

## 2023-02-05 PROBLEM — F10.20 ALCOHOL DEPENDENCE, CONTINUOUS (HCC): Status: ACTIVE | Noted: 2023-02-05

## 2023-02-17 ENCOUNTER — APPOINTMENT (OUTPATIENT)
Dept: MRI IMAGING | Age: 43
End: 2023-02-17
Attending: PSYCHIATRY & NEUROLOGY

## 2023-02-27 ENCOUNTER — HOSPITAL ENCOUNTER (OUTPATIENT)
Age: 43
Setting detail: OBSERVATION
Discharge: HOME | End: 2023-03-01
Attending: EMERGENCY MEDICINE | Admitting: INTERNAL MEDICINE

## 2023-02-27 DIAGNOSIS — F10.920 ALCOHOLIC INTOXICATION WITHOUT COMPLICATION (CMD): Primary | ICD-10-CM

## 2023-02-27 LAB
ALBUMIN SERPL-MCNC: 3.9 G/DL (ref 3.6–5.1)
ALBUMIN/GLOB SERPL: 1 {RATIO} (ref 1–2.4)
ALP SERPL-CCNC: 62 UNITS/L (ref 45–117)
ALT SERPL-CCNC: 82 UNITS/L
AMPHETAMINES UR QL SCN>500 NG/ML: NEGATIVE
ANION GAP SERPL CALC-SCNC: 12 MMOL/L (ref 7–19)
AST SERPL-CCNC: 50 UNITS/L
BARBITURATES UR QL SCN>200 NG/ML: NEGATIVE
BASOPHILS # BLD: 0.1 K/MCL (ref 0–0.3)
BASOPHILS NFR BLD: 1 %
BENZODIAZ UR QL SCN>200 NG/ML: NEGATIVE
BILIRUB SERPL-MCNC: 0.3 MG/DL (ref 0.2–1)
BUN SERPL-MCNC: 5 MG/DL (ref 6–20)
BUN/CREAT SERPL: 14 (ref 7–25)
BZE UR QL SCN>150 NG/ML: NEGATIVE
CALCIUM SERPL-MCNC: 8.5 MG/DL (ref 8.4–10.2)
CANNABINOIDS UR QL SCN>50 NG/ML: NEGATIVE
CHLORIDE SERPL-SCNC: 104 MMOL/L (ref 97–110)
CO2 SERPL-SCNC: 25 MMOL/L (ref 21–32)
CREAT SERPL-MCNC: 0.37 MG/DL (ref 0.51–0.95)
DEPRECATED RDW RBC: 50.8 FL (ref 39–50)
EOSINOPHIL # BLD: 0 K/MCL (ref 0–0.5)
EOSINOPHIL NFR BLD: 0 %
ERYTHROCYTE [DISTWIDTH] IN BLOOD: 14.8 % (ref 11–15)
ETHANOL SERPL-MCNC: 401 MG/DL
FASTING DURATION TIME PATIENT: ABNORMAL H
GFR SERPLBLD BASED ON 1.73 SQ M-ARVRAT: >90 ML/MIN
GLOBULIN SER-MCNC: 3.9 G/DL (ref 2–4)
GLUCOSE SERPL-MCNC: 116 MG/DL (ref 70–99)
HCG SERPL QL: NEGATIVE
HCT VFR BLD CALC: 39.3 % (ref 36–46.5)
HGB BLD-MCNC: 13.1 G/DL (ref 12–15.5)
IMM GRANULOCYTES # BLD AUTO: 0 K/MCL (ref 0–0.2)
IMM GRANULOCYTES # BLD: 0 %
LYMPHOCYTES # BLD: 1 K/MCL (ref 1–4.8)
LYMPHOCYTES NFR BLD: 12 %
MAGNESIUM SERPL-MCNC: 2.3 MG/DL (ref 1.7–2.4)
MCH RBC QN AUTO: 31 PG (ref 26–34)
MCHC RBC AUTO-ENTMCNC: 33.3 G/DL (ref 32–36.5)
MCV RBC AUTO: 92.9 FL (ref 78–100)
MONOCYTES # BLD: 0.3 K/MCL (ref 0.3–0.9)
MONOCYTES NFR BLD: 4 %
NEUTROPHILS # BLD: 6.6 K/MCL (ref 1.8–7.7)
NEUTROPHILS NFR BLD: 83 %
NRBC BLD MANUAL-RTO: 0 /100 WBC
OPIATES UR QL SCN>300 NG/ML: NEGATIVE
PCP UR QL SCN>25 NG/ML: NEGATIVE
PLATELET # BLD AUTO: 138 K/MCL (ref 140–450)
POTASSIUM SERPL-SCNC: 4 MMOL/L (ref 3.4–5.1)
PROT SERPL-MCNC: 7.8 G/DL (ref 6.4–8.2)
RAINBOW EXTRA TUBES HOLD SPECIMEN: NORMAL
RBC # BLD: 4.23 MIL/MCL (ref 4–5.2)
SODIUM SERPL-SCNC: 137 MMOL/L (ref 135–145)
WBC # BLD: 8 K/MCL (ref 4.2–11)

## 2023-02-27 PROCEDURE — 96374 THER/PROPH/DIAG INJ IV PUSH: CPT

## 2023-02-27 PROCEDURE — 10002803 HB RX 637: Performed by: FAMILY MEDICINE

## 2023-02-27 PROCEDURE — G0378 HOSPITAL OBSERVATION PER HR: HCPCS

## 2023-02-27 PROCEDURE — 10002807 HB RX 258: Performed by: EMERGENCY MEDICINE

## 2023-02-27 PROCEDURE — 85025 COMPLETE CBC W/AUTO DIFF WBC: CPT | Performed by: EMERGENCY MEDICINE

## 2023-02-27 PROCEDURE — 96376 TX/PRO/DX INJ SAME DRUG ADON: CPT

## 2023-02-27 PROCEDURE — 10004651 HB RX, NO CHARGE ITEM: Performed by: FAMILY MEDICINE

## 2023-02-27 PROCEDURE — 82077 ASSAY SPEC XCP UR&BREATH IA: CPT | Performed by: EMERGENCY MEDICINE

## 2023-02-27 PROCEDURE — 80307 DRUG TEST PRSMV CHEM ANLYZR: CPT | Performed by: FAMILY MEDICINE

## 2023-02-27 PROCEDURE — 84703 CHORIONIC GONADOTROPIN ASSAY: CPT | Performed by: EMERGENCY MEDICINE

## 2023-02-27 PROCEDURE — 99223 1ST HOSP IP/OBS HIGH 75: CPT | Performed by: FAMILY MEDICINE

## 2023-02-27 PROCEDURE — 96375 TX/PRO/DX INJ NEW DRUG ADDON: CPT

## 2023-02-27 PROCEDURE — 10002807 HB RX 258: Performed by: FAMILY MEDICINE

## 2023-02-27 PROCEDURE — 83735 ASSAY OF MAGNESIUM: CPT | Performed by: EMERGENCY MEDICINE

## 2023-02-27 PROCEDURE — 10002801 HB RX 250 W/O HCPCS: Performed by: EMERGENCY MEDICINE

## 2023-02-27 PROCEDURE — 80053 COMPREHEN METABOLIC PANEL: CPT | Performed by: EMERGENCY MEDICINE

## 2023-02-27 PROCEDURE — 99284 EMERGENCY DEPT VISIT MOD MDM: CPT

## 2023-02-27 PROCEDURE — 96372 THER/PROPH/DIAG INJ SC/IM: CPT

## 2023-02-27 PROCEDURE — 10002800 HB RX 250 W HCPCS: Performed by: EMERGENCY MEDICINE

## 2023-02-27 PROCEDURE — 10002800 HB RX 250 W HCPCS: Performed by: FAMILY MEDICINE

## 2023-02-27 RX ORDER — GABAPENTIN 300 MG/1
300 CAPSULE ORAL 3 TIMES DAILY
Status: DISCONTINUED | OUTPATIENT
Start: 2023-02-27 | End: 2023-02-27 | Stop reason: SDUPTHER

## 2023-02-27 RX ORDER — LEVETIRACETAM 500 MG/1
500 TABLET ORAL 2 TIMES DAILY
Status: DISCONTINUED | OUTPATIENT
Start: 2023-02-27 | End: 2023-03-01 | Stop reason: HOSPADM

## 2023-02-27 RX ORDER — SODIUM CHLORIDE 9 MG/ML
INJECTION, SOLUTION INTRAVENOUS CONTINUOUS
Status: DISCONTINUED | OUTPATIENT
Start: 2023-02-27 | End: 2023-03-01

## 2023-02-27 RX ORDER — LORAZEPAM 1 MG/1
4 TABLET ORAL
Status: DISCONTINUED | OUTPATIENT
Start: 2023-02-27 | End: 2023-03-01 | Stop reason: HOSPADM

## 2023-02-27 RX ORDER — AMLODIPINE BESYLATE 2.5 MG/1
2.5 TABLET ORAL DAILY
COMMUNITY
End: 2023-07-19 | Stop reason: DRUGHIGH

## 2023-02-27 RX ORDER — ACETAMINOPHEN 325 MG/1
650 TABLET ORAL EVERY 4 HOURS PRN
Status: DISCONTINUED | OUTPATIENT
Start: 2023-02-27 | End: 2023-03-01 | Stop reason: HOSPADM

## 2023-02-27 RX ORDER — LORAZEPAM 2 MG/ML
2 INJECTION INTRAMUSCULAR
Status: DISCONTINUED | OUTPATIENT
Start: 2023-02-27 | End: 2023-03-01 | Stop reason: HOSPADM

## 2023-02-27 RX ORDER — ENOXAPARIN SODIUM 100 MG/ML
40 INJECTION SUBCUTANEOUS DAILY
Status: DISCONTINUED | OUTPATIENT
Start: 2023-02-27 | End: 2023-03-01 | Stop reason: HOSPADM

## 2023-02-27 RX ORDER — DIAZEPAM 5 MG/1
10 TABLET ORAL
Status: DISCONTINUED | OUTPATIENT
Start: 2023-02-27 | End: 2023-03-01 | Stop reason: HOSPADM

## 2023-02-27 RX ORDER — SERTRALINE HYDROCHLORIDE 100 MG/1
200 TABLET, FILM COATED ORAL DAILY
COMMUNITY

## 2023-02-27 RX ORDER — AMLODIPINE BESYLATE 5 MG/1
5 TABLET ORAL DAILY
Status: DISCONTINUED | OUTPATIENT
Start: 2023-02-27 | End: 2023-03-01 | Stop reason: HOSPADM

## 2023-02-27 RX ORDER — LORAZEPAM 1 MG/1
3 TABLET ORAL
Status: DISCONTINUED | OUTPATIENT
Start: 2023-02-27 | End: 2023-03-01 | Stop reason: HOSPADM

## 2023-02-27 RX ORDER — FOLIC ACID 1 MG/1
1 TABLET ORAL DAILY
Status: COMPLETED | OUTPATIENT
Start: 2023-02-27 | End: 2023-03-01

## 2023-02-27 RX ORDER — LORAZEPAM 2 MG/ML
3 INJECTION INTRAMUSCULAR
Status: DISCONTINUED | OUTPATIENT
Start: 2023-02-27 | End: 2023-03-01 | Stop reason: HOSPADM

## 2023-02-27 RX ORDER — 0.9 % SODIUM CHLORIDE 0.9 %
2 VIAL (ML) INJECTION EVERY 12 HOURS SCHEDULED
Status: DISCONTINUED | OUTPATIENT
Start: 2023-02-27 | End: 2023-03-01 | Stop reason: HOSPADM

## 2023-02-27 RX ORDER — LORAZEPAM 2 MG/ML
4 INJECTION INTRAMUSCULAR
Status: DISCONTINUED | OUTPATIENT
Start: 2023-02-27 | End: 2023-03-01 | Stop reason: HOSPADM

## 2023-02-27 RX ORDER — FOLIC ACID 1 MG/1
1 TABLET ORAL DAILY
COMMUNITY
End: 2023-07-19 | Stop reason: DRUGHIGH

## 2023-02-27 RX ORDER — FOLIC ACID 5 MG/ML
1 INJECTION, SOLUTION INTRAMUSCULAR; INTRAVENOUS; SUBCUTANEOUS ONCE
Status: COMPLETED | OUTPATIENT
Start: 2023-02-27 | End: 2023-02-27

## 2023-02-27 RX ORDER — ONDANSETRON 2 MG/ML
4 INJECTION INTRAMUSCULAR; INTRAVENOUS 2 TIMES DAILY PRN
Status: DISCONTINUED | OUTPATIENT
Start: 2023-02-27 | End: 2023-03-01 | Stop reason: HOSPADM

## 2023-02-27 RX ORDER — LORAZEPAM 1 MG/1
2 TABLET ORAL
Status: DISCONTINUED | OUTPATIENT
Start: 2023-02-27 | End: 2023-03-01 | Stop reason: HOSPADM

## 2023-02-27 RX ORDER — THIAMINE HYDROCHLORIDE 100 MG/ML
100 INJECTION, SOLUTION INTRAMUSCULAR; INTRAVENOUS ONCE
Status: COMPLETED | OUTPATIENT
Start: 2023-02-27 | End: 2023-02-27

## 2023-02-27 RX ORDER — HYDRALAZINE HYDROCHLORIDE 50 MG/1
25 TABLET, FILM COATED ORAL EVERY 6 HOURS PRN
Status: DISCONTINUED | OUTPATIENT
Start: 2023-02-27 | End: 2023-03-01 | Stop reason: HOSPADM

## 2023-02-27 RX ORDER — SERTRALINE HYDROCHLORIDE 100 MG/1
200 TABLET, FILM COATED ORAL DAILY
Status: DISCONTINUED | OUTPATIENT
Start: 2023-02-27 | End: 2023-03-01 | Stop reason: HOSPADM

## 2023-02-27 RX ADMIN — SERTRALINE HYDROCHLORIDE 200 MG: 100 TABLET, FILM COATED ORAL at 18:16

## 2023-02-27 RX ADMIN — FOLIC ACID 1 MG: 1 TABLET ORAL at 14:13

## 2023-02-27 RX ADMIN — LORAZEPAM 3 MG: 2 INJECTION INTRAMUSCULAR; INTRAVENOUS at 18:16

## 2023-02-27 RX ADMIN — SODIUM CHLORIDE, PRESERVATIVE FREE 2 ML: 5 INJECTION INTRAVENOUS at 20:00

## 2023-02-27 RX ADMIN — SODIUM CHLORIDE, PRESERVATIVE FREE 2 ML: 5 INJECTION INTRAVENOUS at 14:13

## 2023-02-27 RX ADMIN — LEVETIRACETAM 500 MG: 500 TABLET, FILM COATED ORAL at 20:00

## 2023-02-27 RX ADMIN — DIAZEPAM 10 MG: 5 TABLET ORAL at 19:48

## 2023-02-27 RX ADMIN — LORAZEPAM 2 MG: 2 INJECTION INTRAMUSCULAR; INTRAVENOUS at 15:04

## 2023-02-27 RX ADMIN — THIAMINE HYDROCHLORIDE 100 MG: 100 INJECTION, SOLUTION INTRAMUSCULAR; INTRAVENOUS at 11:45

## 2023-02-27 RX ADMIN — FOLIC ACID 1 MG: 5 INJECTION, SOLUTION INTRAMUSCULAR; INTRAVENOUS; SUBCUTANEOUS at 11:46

## 2023-02-27 RX ADMIN — SODIUM CHLORIDE, POTASSIUM CHLORIDE, SODIUM LACTATE AND CALCIUM CHLORIDE 1000 ML: 600; 310; 30; 20 INJECTION, SOLUTION INTRAVENOUS at 11:49

## 2023-02-27 RX ADMIN — THIAMINE HCL TAB 100 MG 100 MG: 100 TAB at 14:13

## 2023-02-27 RX ADMIN — LORAZEPAM 4 MG: 2 INJECTION INTRAMUSCULAR; INTRAVENOUS at 13:24

## 2023-02-27 RX ADMIN — AMLODIPINE BESYLATE 5 MG: 5 TABLET ORAL at 15:04

## 2023-02-27 RX ADMIN — TRAZODONE HYDROCHLORIDE 150 MG: 100 TABLET ORAL at 20:00

## 2023-02-27 RX ADMIN — ENOXAPARIN SODIUM 40 MG: 40 INJECTION SUBCUTANEOUS at 14:13

## 2023-02-27 RX ADMIN — DIAZEPAM 10 MG: 5 TABLET ORAL at 15:42

## 2023-02-27 RX ADMIN — SODIUM CHLORIDE: 9 INJECTION, SOLUTION INTRAVENOUS at 13:34

## 2023-02-27 SDOH — HEALTH STABILITY: PHYSICAL HEALTH: DO YOU HAVE SERIOUS DIFFICULTY WALKING OR CLIMBING STAIRS?: NO

## 2023-02-27 SDOH — HEALTH STABILITY: GENERAL
BECAUSE OF A PHYSICAL, MENTAL, OR EMOTIONAL CONDITION, DO YOU HAVE SERIOUS DIFFICULTY CONCENTRATING, REMEMBERING OR MAKING DECISIONS?: NO

## 2023-02-27 SDOH — SOCIAL STABILITY: SOCIAL NETWORK: SUPPORT SYSTEMS: FAMILY MEMBERS

## 2023-02-27 SDOH — ECONOMIC STABILITY: FOOD INSECURITY: HOW OFTEN IN THE PAST 12 MONTHS WERE YOU WORRIED OR STRESSED ABOUT HAVING ENOUGH MONEY TO BUY NUTRITIOUS MEALS?: USUALLY

## 2023-02-27 SDOH — ECONOMIC STABILITY: TRANSPORTATION INSECURITY
IN THE PAST 12 MONTHS, HAS LACK OF TRANSPORTATION KEPT YOU FROM MEETINGS, WORK, OR FROM GETTING THINGS NEEDED FOR DAILY LIVING?: YES

## 2023-02-27 SDOH — ECONOMIC STABILITY: HOUSING INSECURITY: WHAT IS YOUR LIVING SITUATION TODAY?: ALONE

## 2023-02-27 SDOH — ECONOMIC STABILITY: HOUSING INSECURITY: WHAT IS YOUR LIVING SITUATION TODAY?: HOMELESS

## 2023-02-27 SDOH — HEALTH STABILITY: PHYSICAL HEALTH: DO YOU HAVE DIFFICULTY DRESSING OR BATHING?: NO

## 2023-02-27 SDOH — ECONOMIC STABILITY: GENERAL
IN THE PAST YEAR, HAVE YOU OR ANY FAMILY MEMBERS YOU LIVE WITH BEEN UNABLE TO GET ANY OF THE FOLLOWING WHEN IT WAS REALLY NEEDED? CHECK ALL THAT APPLY.: CLOTHING;MEDICINE OR ANY HEALTH CARE (MEDICAL, DENTAL, MENTAL HEALTH, VISION)

## 2023-02-27 SDOH — HEALTH STABILITY: GENERAL: BECAUSE OF A PHYSICAL, MENTAL, OR EMOTIONAL CONDITION, DO YOU HAVE DIFFICULTY DOING ERRANDS ALONE?: NO

## 2023-02-27 SDOH — ECONOMIC STABILITY: HOUSING INSECURITY: ARE YOU WORRIED ABOUT LOSING YOUR HOUSING?: NO

## 2023-02-27 SDOH — ECONOMIC STABILITY: TRANSPORTATION INSECURITY
IN THE PAST 12 MONTHS, HAS THE LACK OF TRANSPORTATION KEPT YOU FROM MEDICAL APPOINTMENTS OR FROM GETTING MEDICATIONS?: YES

## 2023-02-27 SDOH — SOCIAL STABILITY: SOCIAL NETWORK
HOW OFTEN DO YOU SEE OR TALK TO PEOPLE THAT YOU CARE ABOUT AND FEEL CLOSE TO? (FOR EXAMPLE: TALKING TO FRIENDS ON THE PHONE, VISITING FRIENDS OR FAMILY, GOING TO CHURCH OR CLUB MEETINGS): 3 TO 5 TIMES A WEEK

## 2023-02-27 ASSESSMENT — LIFESTYLE VARIABLES
NAUSEA AND VOMITING: 3
HEADACHE, FULLNESS IN HEAD: MODERATE
PAROXYSMAL SWEATS: NO SWEAT VISIBLE
TREMOR: NO TREMOR
AUDITORY DISTURBANCES: NOT PRESENT
VISUAL DISTURBANCES: NOT PRESENT
ANXIETY: NO ANXIETY, AT EASE
AUDITORY DISTURBANCES: NOT PRESENT
AGITATION: NORMAL ACTIVITY
ANXIETY: 3
VISUAL DISTURBANCES: NOT PRESENT
AGITATION: NORMAL ACTIVITY
AGITATION: NORMAL ACTIVITY
AUDIT-C TOTAL SCORE: 12
AUDITORY DISTURBANCES: NOT PRESENT
ANXIETY: MILDLY ANXIOUS
AGITATION: SOMEWHAT MORE THAN NORMAL ACTIVITY
ANXIETY: MODERATELY ANXIOUS, OR GUARDED, SO ANXIETY IS INFERRED
PAROXYSMAL SWEATS: NO SWEAT VISIBLE
TREMOR: NO TREMOR
HEADACHE, FULLNESS IN HEAD: NOT PRESENT
VISUAL DISTURBANCES: NOT PRESENT
NAUSEA AND VOMITING: 3
PAROXYSMAL SWEATS: NO SWEAT VISIBLE
HOW OFTEN DO YOU HAVE A DRINK CONTAINING ALCOHOL: 4 OR MORE TIMES PER WEEK
HEADACHE, FULLNESS IN HEAD: NOT PRESENT
TREMOR: MODERATE, WITH PATIENT'S ARMS EXTENDED
HOW MANY STANDARD DRINKS CONTAINING ALCOHOL DO YOU HAVE ON A TYPICAL DAY: 10 OR MORE
PAROXYSMAL SWEATS: NO SWEAT VISIBLE
AGITATION: NORMAL ACTIVITY
NAUSEA AND VOMITING: NO NAUSEA AND NO VOMITING
AUDITORY DISTURBANCES: NOT PRESENT
PAROXYSMAL SWEATS: NO SWEAT VISIBLE
AGITATION: 2
HEADACHE, FULLNESS IN HEAD: VERY MILD
ANXIETY: MILDLY ANXIOUS
NAUSEA AND VOMITING: MILD NAUSEA WITH NO VOMITING
VISUAL DISTURBANCES: NOT PRESENT
PAROXYSMAL SWEATS: NO SWEAT VISIBLE
ANXIETY: 5
HEADACHE, FULLNESS IN HEAD: NOT PRESENT
TREMOR: MODERATE, WITH PATIENT'S ARMS EXTENDED
NAUSEA AND VOMITING: NO NAUSEA AND NO VOMITING
TREMOR: 2
AGITATION: SOMEWHAT MORE THAN NORMAL ACTIVITY
HOW OFTEN DO YOU HAVE 6 OR MORE DRINKS ON ONE OCCASION: DAILY OR ALMOST DAILY
AUDITORY DISTURBANCES: NOT PRESENT
NAUSEA AND VOMITING: MILD NAUSEA WITH NO VOMITING
VISUAL DISTURBANCES: NOT PRESENT
VISUAL DISTURBANCES: NOT PRESENT
HEADACHE, FULLNESS IN HEAD: NOT PRESENT
VISUAL DISTURBANCES: NOT PRESENT
ALCOHOL_USE_STATUS: UNHEALTHY DRINKING IDENTIFIED. AUDIT C: 3 OR MORE FOR WOMEN AND 4 OR MORE FOR MEN.
TREMOR: 2
TREMOR: 2
HEADACHE, FULLNESS IN HEAD: NOT PRESENT
PAROXYSMAL SWEATS: NO SWEAT VISIBLE
ANXIETY: MODERATELY ANXIOUS, OR GUARDED, SO ANXIETY IS INFERRED
NAUSEA AND VOMITING: 5

## 2023-02-27 ASSESSMENT — PATIENT HEALTH QUESTIONNAIRE - PHQ9
CLINICAL INTERPRETATION OF PHQ2 SCORE: FURTHER SCREENING NEEDED
7. TROUBLE CONCENTRATING ON THINGS, SUCH AS READING THE NEWSPAPER OR WATCHING TELEVISION: MORE THAN HALF THE DAYS
3. TROUBLE FALLING OR STAYING ASLEEP OR SLEEPING TOO MUCH: MORE THAN HALF THE DAYS
9. THOUGHTS THAT YOU WOULD BE BETTER OFF DEAD, OR OF HURTING YOURSELF: NOT AT ALL
SUM OF ALL RESPONSES TO PHQ QUESTIONS 1-9: 14
8. MOVING OR SPEAKING SO SLOWLY THAT OTHER PEOPLE COULD HAVE NOTICED. OR THE OPPOSITE, BEING SO FIGETY OR RESTLESS THAT YOU HAVE BEEN MOVING AROUND A LOT MORE THAN USUAL: NOT AT ALL
CLINICAL INTERPRETATION OF PHQ9 SCORE: MODERATE DEPRESSION
1. LITTLE INTEREST OR PLEASURE IN DOING THINGS: MORE THAN HALF THE DAYS
2. FEELING DOWN, DEPRESSED OR HOPELESS: MORE THAN HALF THE DAYS
4. FEELING TIRED OR HAVING LITTLE ENERGY: MORE THAN HALF THE DAYS
6. FEELING BAD ABOUT YOURSELF - OR THAT YOU ARE A FAILURE OR HAVE LET YOURSELF OR YOUR FAMILY DOWN: MORE THAN HALF THE DAYS
SUM OF ALL RESPONSES TO PHQ9 QUESTIONS 1 AND 2: 4
10. IF YOU CHECKED OFF ANY PROBLEMS, HOW DIFFICULT HAVE THESE PROBLEMS MADE IT FOR YOU TO DO YOUR WORK, TAKE CARE OF THINGS AT HOME, OR GET ALONG WITH OTHER PEOPLE: VERY DIFFICULT
IS PATIENT ABLE TO COMPLETE PHQ2 OR PHQ9: YES
5. POOR APPETITE OR OVEREATING: MORE THAN HALF THE DAYS
SUM OF ALL RESPONSES TO PHQ9 QUESTIONS 1 AND 2: 4

## 2023-02-27 ASSESSMENT — ACTIVITIES OF DAILY LIVING (ADL)
ADL_SHORT_OF_BREATH: NO
ADL_SCORE: 12
ADL_BEFORE_ADMISSION: INDEPENDENT
RECENT_DECLINE_ADL: NO

## 2023-02-27 ASSESSMENT — COLUMBIA-SUICIDE SEVERITY RATING SCALE - C-SSRS
6. HAVE YOU EVER DONE ANYTHING, STARTED TO DO ANYTHING, OR PREPARED TO DO ANYTHING TO END YOUR LIFE?: NO
2. HAVE YOU ACTUALLY HAD ANY THOUGHTS OF KILLING YOURSELF?: NO
IS THE PATIENT ABLE TO COMPLETE C-SSRS: YES
1. WITHIN THE PAST MONTH, HAVE YOU WISHED YOU WERE DEAD OR WISHED YOU COULD GO TO SLEEP AND NOT WAKE UP?: YES

## 2023-02-27 ASSESSMENT — PAIN SCALES - GENERAL
PAINLEVEL_OUTOF10: 0

## 2023-02-28 LAB
ALBUMIN SERPL-MCNC: 3.3 G/DL (ref 3.6–5.1)
ALBUMIN/GLOB SERPL: 1 {RATIO} (ref 1–2.4)
ALP SERPL-CCNC: 56 UNITS/L (ref 45–117)
ALT SERPL-CCNC: 60 UNITS/L
ANION GAP SERPL CALC-SCNC: 12 MMOL/L (ref 7–19)
AST SERPL-CCNC: 37 UNITS/L
BASOPHILS # BLD: 0 K/MCL (ref 0–0.3)
BASOPHILS NFR BLD: 1 %
BILIRUB SERPL-MCNC: 0.9 MG/DL (ref 0.2–1)
BUN SERPL-MCNC: 7 MG/DL (ref 6–20)
BUN/CREAT SERPL: 18 (ref 7–25)
CALCIUM SERPL-MCNC: 8.4 MG/DL (ref 8.4–10.2)
CHLORIDE SERPL-SCNC: 105 MMOL/L (ref 97–110)
CO2 SERPL-SCNC: 22 MMOL/L (ref 21–32)
CREAT SERPL-MCNC: 0.38 MG/DL (ref 0.51–0.95)
DEPRECATED RDW RBC: 50.4 FL (ref 39–50)
EOSINOPHIL # BLD: 0.2 K/MCL (ref 0–0.5)
EOSINOPHIL NFR BLD: 3 %
ERYTHROCYTE [DISTWIDTH] IN BLOOD: 14.6 % (ref 11–15)
FASTING DURATION TIME PATIENT: ABNORMAL H
GFR SERPLBLD BASED ON 1.73 SQ M-ARVRAT: >90 ML/MIN
GLOBULIN SER-MCNC: 3.4 G/DL (ref 2–4)
GLUCOSE SERPL-MCNC: 88 MG/DL (ref 70–99)
HCT VFR BLD CALC: 36 % (ref 36–46.5)
HGB BLD-MCNC: 11.9 G/DL (ref 12–15.5)
IMM GRANULOCYTES # BLD AUTO: 0 K/MCL (ref 0–0.2)
IMM GRANULOCYTES # BLD: 0 %
LYMPHOCYTES # BLD: 1.3 K/MCL (ref 1–4.8)
LYMPHOCYTES NFR BLD: 26 %
MCH RBC QN AUTO: 30.9 PG (ref 26–34)
MCHC RBC AUTO-ENTMCNC: 33.1 G/DL (ref 32–36.5)
MCV RBC AUTO: 93.5 FL (ref 78–100)
MONOCYTES # BLD: 0.3 K/MCL (ref 0.3–0.9)
MONOCYTES NFR BLD: 5 %
NEUTROPHILS # BLD: 3.2 K/MCL (ref 1.8–7.7)
NEUTROPHILS NFR BLD: 65 %
NRBC BLD MANUAL-RTO: 0 /100 WBC
PLATELET # BLD AUTO: 115 K/MCL (ref 140–450)
POTASSIUM SERPL-SCNC: 3.5 MMOL/L (ref 3.4–5.1)
POTASSIUM SERPL-SCNC: 3.9 MMOL/L (ref 3.4–5.1)
PROT SERPL-MCNC: 6.7 G/DL (ref 6.4–8.2)
RAINBOW EXTRA TUBES HOLD SPECIMEN: NORMAL
RBC # BLD: 3.85 MIL/MCL (ref 4–5.2)
SODIUM SERPL-SCNC: 135 MMOL/L (ref 135–145)
WBC # BLD: 5 K/MCL (ref 4.2–11)

## 2023-02-28 PROCEDURE — 10004651 HB RX, NO CHARGE ITEM: Performed by: FAMILY MEDICINE

## 2023-02-28 PROCEDURE — 10002800 HB RX 250 W HCPCS: Performed by: FAMILY MEDICINE

## 2023-02-28 PROCEDURE — 85025 COMPLETE CBC W/AUTO DIFF WBC: CPT | Performed by: FAMILY MEDICINE

## 2023-02-28 PROCEDURE — 10002803 HB RX 637: Performed by: FAMILY MEDICINE

## 2023-02-28 PROCEDURE — 96376 TX/PRO/DX INJ SAME DRUG ADON: CPT

## 2023-02-28 PROCEDURE — 36415 COLL VENOUS BLD VENIPUNCTURE: CPT | Performed by: FAMILY MEDICINE

## 2023-02-28 PROCEDURE — 10002807 HB RX 258: Performed by: FAMILY MEDICINE

## 2023-02-28 PROCEDURE — G0378 HOSPITAL OBSERVATION PER HR: HCPCS

## 2023-02-28 PROCEDURE — 84132 ASSAY OF SERUM POTASSIUM: CPT | Performed by: FAMILY MEDICINE

## 2023-02-28 PROCEDURE — 80053 COMPREHEN METABOLIC PANEL: CPT | Performed by: FAMILY MEDICINE

## 2023-02-28 PROCEDURE — 99232 SBSQ HOSP IP/OBS MODERATE 35: CPT | Performed by: FAMILY MEDICINE

## 2023-02-28 PROCEDURE — 96372 THER/PROPH/DIAG INJ SC/IM: CPT

## 2023-02-28 PROCEDURE — 96361 HYDRATE IV INFUSION ADD-ON: CPT

## 2023-02-28 RX ORDER — DIAZEPAM 5 MG/1
5 TABLET ORAL ONCE
Status: COMPLETED | OUTPATIENT
Start: 2023-02-28 | End: 2023-02-28

## 2023-02-28 RX ORDER — POTASSIUM CHLORIDE 20 MEQ/1
40 TABLET, EXTENDED RELEASE ORAL ONCE
Status: COMPLETED | OUTPATIENT
Start: 2023-02-28 | End: 2023-02-28

## 2023-02-28 RX ADMIN — DIAZEPAM 10 MG: 5 TABLET ORAL at 22:07

## 2023-02-28 RX ADMIN — LORAZEPAM 2 MG: 2 INJECTION INTRAMUSCULAR; INTRAVENOUS at 14:54

## 2023-02-28 RX ADMIN — SODIUM CHLORIDE, PRESERVATIVE FREE 2 ML: 5 INJECTION INTRAVENOUS at 20:42

## 2023-02-28 RX ADMIN — LORAZEPAM 2 MG: 2 INJECTION INTRAMUSCULAR; INTRAVENOUS at 01:08

## 2023-02-28 RX ADMIN — SERTRALINE HYDROCHLORIDE 200 MG: 100 TABLET, FILM COATED ORAL at 08:13

## 2023-02-28 RX ADMIN — POTASSIUM CHLORIDE 40 MEQ: 1500 TABLET, EXTENDED RELEASE ORAL at 09:44

## 2023-02-28 RX ADMIN — LEVETIRACETAM 500 MG: 500 TABLET, FILM COATED ORAL at 20:42

## 2023-02-28 RX ADMIN — FOLIC ACID 1 MG: 1 TABLET ORAL at 08:13

## 2023-02-28 RX ADMIN — THIAMINE HCL TAB 100 MG 100 MG: 100 TAB at 08:13

## 2023-02-28 RX ADMIN — DIAZEPAM 5 MG: 5 TABLET ORAL at 17:58

## 2023-02-28 RX ADMIN — TRAZODONE HYDROCHLORIDE 150 MG: 100 TABLET ORAL at 21:38

## 2023-02-28 RX ADMIN — LEVETIRACETAM 500 MG: 500 TABLET, FILM COATED ORAL at 08:13

## 2023-02-28 RX ADMIN — ENOXAPARIN SODIUM 40 MG: 40 INJECTION SUBCUTANEOUS at 09:44

## 2023-02-28 RX ADMIN — SODIUM CHLORIDE: 9 INJECTION, SOLUTION INTRAVENOUS at 11:26

## 2023-02-28 RX ADMIN — AMLODIPINE BESYLATE 5 MG: 5 TABLET ORAL at 08:13

## 2023-02-28 RX ADMIN — LORAZEPAM 2 MG: 1 TABLET ORAL at 06:06

## 2023-02-28 ASSESSMENT — LIFESTYLE VARIABLES
VISUAL DISTURBANCES: NOT PRESENT
NAUSEA AND VOMITING: NO NAUSEA AND NO VOMITING
VISUAL DISTURBANCES: NOT PRESENT
PAROXYSMAL SWEATS: 3
TREMOR: NOT VISIBLE, BUT CAN BE FELT FINGERTIP TO FINGERTIP
AGITATION: NORMAL ACTIVITY
NAUSEA AND VOMITING: 2
ANXIETY: MILDLY ANXIOUS
TREMOR: MODERATE, WITH PATIENT'S ARMS EXTENDED
AUDITORY DISTURBANCES: NOT PRESENT
HEADACHE, FULLNESS IN HEAD: VERY MILD
HEADACHE, FULLNESS IN HEAD: NOT PRESENT
AGITATION: NORMAL ACTIVITY
HEADACHE, FULLNESS IN HEAD: NOT PRESENT
ANXIETY: NO ANXIETY, AT EASE
VISUAL DISTURBANCES: NOT PRESENT
AGITATION: NORMAL ACTIVITY
AGITATION: NORMAL ACTIVITY
TREMOR: 2
NAUSEA AND VOMITING: NO NAUSEA AND NO VOMITING
NAUSEA AND VOMITING: NO NAUSEA AND NO VOMITING
HEADACHE, FULLNESS IN HEAD: NOT PRESENT
NAUSEA AND VOMITING: NO NAUSEA AND NO VOMITING
ANXIETY: MILDLY ANXIOUS
NAUSEA AND VOMITING: NO NAUSEA AND NO VOMITING
ANXIETY: 3
HEADACHE, FULLNESS IN HEAD: NOT PRESENT
HEADACHE, FULLNESS IN HEAD: NOT PRESENT
AGITATION: NORMAL ACTIVITY
AUDITORY DISTURBANCES: NOT PRESENT
TREMOR: NO TREMOR
PAROXYSMAL SWEATS: 2
TREMOR: MODERATE, WITH PATIENT'S ARMS EXTENDED
AGITATION: NORMAL ACTIVITY
PAROXYSMAL SWEATS: NO SWEAT VISIBLE
VISUAL DISTURBANCES: NOT PRESENT
AGITATION: SOMEWHAT MORE THAN NORMAL ACTIVITY
VISUAL DISTURBANCES: NOT PRESENT
ANXIETY: NO ANXIETY, AT EASE
HEADACHE, FULLNESS IN HEAD: NOT PRESENT
TREMOR: NO TREMOR
VISUAL DISTURBANCES: NOT PRESENT
ANXIETY: NO ANXIETY, AT EASE
AUDITORY DISTURBANCES: NOT PRESENT
NAUSEA AND VOMITING: NO NAUSEA AND NO VOMITING
TREMOR: NOT VISIBLE, BUT CAN BE FELT FINGERTIP TO FINGERTIP
ANXIETY: NO ANXIETY, AT EASE
AUDITORY DISTURBANCES: NOT PRESENT
AUDITORY DISTURBANCES: NOT PRESENT
VISUAL DISTURBANCES: NOT PRESENT
VISUAL DISTURBANCES: NOT PRESENT
TREMOR: NOT VISIBLE, BUT CAN BE FELT FINGERTIP TO FINGERTIP
VISUAL DISTURBANCES: NOT PRESENT
NAUSEA AND VOMITING: 3
AGITATION: NORMAL ACTIVITY
NAUSEA AND VOMITING: NO NAUSEA AND NO VOMITING
VISUAL DISTURBANCES: NOT PRESENT
PAROXYSMAL SWEATS: 2
AGITATION: NORMAL ACTIVITY
AUDITORY DISTURBANCES: NOT PRESENT
PAROXYSMAL SWEATS: 2
VISUAL DISTURBANCES: NOT PRESENT
TREMOR: NOT VISIBLE, BUT CAN BE FELT FINGERTIP TO FINGERTIP
PAROXYSMAL SWEATS: NO SWEAT VISIBLE
HEADACHE, FULLNESS IN HEAD: NOT PRESENT
VISUAL DISTURBANCES: NOT PRESENT
HEADACHE, FULLNESS IN HEAD: NOT PRESENT
TREMOR: NOT VISIBLE, BUT CAN BE FELT FINGERTIP TO FINGERTIP
ANXIETY: MILDLY ANXIOUS
AGITATION: SOMEWHAT MORE THAN NORMAL ACTIVITY
PAROXYSMAL SWEATS: 3
HEADACHE, FULLNESS IN HEAD: NOT PRESENT
HEADACHE, FULLNESS IN HEAD: NOT PRESENT
AUDITORY DISTURBANCES: NOT PRESENT
AGITATION: NORMAL ACTIVITY
AUDITORY DISTURBANCES: NOT PRESENT
AGITATION: SOMEWHAT MORE THAN NORMAL ACTIVITY
AUDITORY DISTURBANCES: NOT PRESENT
TREMOR: 2
TREMOR: 2
AGITATION: SOMEWHAT MORE THAN NORMAL ACTIVITY
AUDITORY DISTURBANCES: NOT PRESENT
AGITATION: NORMAL ACTIVITY
NAUSEA AND VOMITING: 2
NAUSEA AND VOMITING: NO NAUSEA AND NO VOMITING
ANXIETY: NO ANXIETY, AT EASE
PAROXYSMAL SWEATS: NO SWEAT VISIBLE
VISUAL DISTURBANCES: NOT PRESENT
TREMOR: 2
PAROXYSMAL SWEATS: 2
AUDITORY DISTURBANCES: NOT PRESENT
TREMOR: MODERATE, WITH PATIENT'S ARMS EXTENDED
AUDITORY DISTURBANCES: NOT PRESENT
PAROXYSMAL SWEATS: BARELY PERCEPTIBLE SWEATING, PALMS MOIST
AUDITORY DISTURBANCES: NOT PRESENT
ANXIETY: MILDLY ANXIOUS
ANXIETY: NO ANXIETY, AT EASE
HEADACHE, FULLNESS IN HEAD: NOT PRESENT
HEADACHE, FULLNESS IN HEAD: NOT PRESENT
PAROXYSMAL SWEATS: NO SWEAT VISIBLE
NAUSEA AND VOMITING: NO NAUSEA AND NO VOMITING
PAROXYSMAL SWEATS: 2
AGITATION: SOMEWHAT MORE THAN NORMAL ACTIVITY
AGITATION: NORMAL ACTIVITY
HEADACHE, FULLNESS IN HEAD: NOT PRESENT
AUDITORY DISTURBANCES: NOT PRESENT
NAUSEA AND VOMITING: 3
HEADACHE, FULLNESS IN HEAD: NOT PRESENT
PAROXYSMAL SWEATS: BARELY PERCEPTIBLE SWEATING, PALMS MOIST
PAROXYSMAL SWEATS: NO SWEAT VISIBLE
ANXIETY: 2
ANXIETY: NO ANXIETY, AT EASE
HEADACHE, FULLNESS IN HEAD: NOT PRESENT
VISUAL DISTURBANCES: NOT PRESENT
ANXIETY: MILDLY ANXIOUS
NAUSEA AND VOMITING: NO NAUSEA AND NO VOMITING
AUDITORY DISTURBANCES: NOT PRESENT
VISUAL DISTURBANCES: NOT PRESENT
ANXIETY: MILDLY ANXIOUS
NAUSEA AND VOMITING: 3
NAUSEA AND VOMITING: MILD NAUSEA WITH NO VOMITING
PAROXYSMAL SWEATS: 3
VISUAL DISTURBANCES: NOT PRESENT
TREMOR: 3
PAROXYSMAL SWEATS: NO SWEAT VISIBLE
ANXIETY: NO ANXIETY, AT EASE
AUDITORY DISTURBANCES: NOT PRESENT
TREMOR: MODERATE, WITH PATIENT'S ARMS EXTENDED

## 2023-02-28 ASSESSMENT — PAIN SCALES - GENERAL
PAINLEVEL_OUTOF10: 0

## 2023-02-28 ASSESSMENT — COGNITIVE AND FUNCTIONAL STATUS - GENERAL
BECAUSE OF A PHYSICAL, MENTAL, OR EMOTIONAL CONDITION, DO YOU HAVE SERIOUS DIFFICULTY CONCENTRATING, REMEMBERING OR MAKING DECISIONS: NO
BECAUSE OF A PHYSICAL, MENTAL, OR EMOTIONAL CONDITION, DO YOU HAVE DIFFICULTY DOING ERRANDS ALONE: NO
DO YOU HAVE DIFFICULTY DRESSING OR BATHING: NO

## 2023-03-01 VITALS
TEMPERATURE: 98.2 F | OXYGEN SATURATION: 93 % | BODY MASS INDEX: 28.2 KG/M2 | HEART RATE: 70 BPM | DIASTOLIC BLOOD PRESSURE: 87 MMHG | RESPIRATION RATE: 16 BRPM | HEIGHT: 63 IN | SYSTOLIC BLOOD PRESSURE: 126 MMHG | WEIGHT: 159.17 LBS

## 2023-03-01 PROCEDURE — 10002800 HB RX 250 W HCPCS: Performed by: FAMILY MEDICINE

## 2023-03-01 PROCEDURE — 96361 HYDRATE IV INFUSION ADD-ON: CPT

## 2023-03-01 PROCEDURE — 96372 THER/PROPH/DIAG INJ SC/IM: CPT

## 2023-03-01 PROCEDURE — 99239 HOSP IP/OBS DSCHRG MGMT >30: CPT | Performed by: FAMILY MEDICINE

## 2023-03-01 PROCEDURE — G0378 HOSPITAL OBSERVATION PER HR: HCPCS

## 2023-03-01 PROCEDURE — 10002803 HB RX 637: Performed by: FAMILY MEDICINE

## 2023-03-01 PROCEDURE — 97161 PT EVAL LOW COMPLEX 20 MIN: CPT

## 2023-03-01 RX ADMIN — THIAMINE HCL TAB 100 MG 100 MG: 100 TAB at 09:21

## 2023-03-01 RX ADMIN — FOLIC ACID 1 MG: 1 TABLET ORAL at 09:03

## 2023-03-01 RX ADMIN — LORAZEPAM 2 MG: 1 TABLET ORAL at 06:38

## 2023-03-01 RX ADMIN — AMLODIPINE BESYLATE 5 MG: 5 TABLET ORAL at 09:03

## 2023-03-01 RX ADMIN — LEVETIRACETAM 500 MG: 500 TABLET, FILM COATED ORAL at 09:03

## 2023-03-01 RX ADMIN — LORAZEPAM 2 MG: 1 TABLET ORAL at 09:03

## 2023-03-01 RX ADMIN — DIAZEPAM 10 MG: 5 TABLET ORAL at 10:06

## 2023-03-01 RX ADMIN — SERTRALINE HYDROCHLORIDE 200 MG: 100 TABLET, FILM COATED ORAL at 09:03

## 2023-03-01 RX ADMIN — ENOXAPARIN SODIUM 40 MG: 40 INJECTION SUBCUTANEOUS at 09:03

## 2023-03-01 ASSESSMENT — LIFESTYLE VARIABLES
HEADACHE, FULLNESS IN HEAD: NOT PRESENT
ANXIETY: 2
VISUAL DISTURBANCES: NOT PRESENT
AUDITORY DISTURBANCES: NOT PRESENT
AGITATION: NORMAL ACTIVITY
ANXIETY: NO ANXIETY, AT EASE
PAROXYSMAL SWEATS: NO SWEAT VISIBLE
ANXIETY: NO ANXIETY, AT EASE
ANXIETY: 5
HEADACHE, FULLNESS IN HEAD: MODERATE
TREMOR: 2
PAROXYSMAL SWEATS: 2
NAUSEA AND VOMITING: NO NAUSEA AND NO VOMITING
NAUSEA AND VOMITING: NO NAUSEA AND NO VOMITING
NAUSEA AND VOMITING: 2
PAROXYSMAL SWEATS: 2
AUDITORY DISTURBANCES: NOT PRESENT
VISUAL DISTURBANCES: NOT PRESENT
AUDITORY DISTURBANCES: NOT PRESENT
VISUAL DISTURBANCES: NOT PRESENT
PAROXYSMAL SWEATS: 2
AGITATION: NORMAL ACTIVITY
TREMOR: 3
HEADACHE, FULLNESS IN HEAD: NOT PRESENT
AGITATION: NORMAL ACTIVITY
TREMOR: NOT VISIBLE, BUT CAN BE FELT FINGERTIP TO FINGERTIP
AUDITORY DISTURBANCES: NOT PRESENT
ANXIETY: NO ANXIETY, AT EASE
PAROXYSMAL SWEATS: NO SWEAT VISIBLE
NAUSEA AND VOMITING: NO NAUSEA AND NO VOMITING
AGITATION: NORMAL ACTIVITY
HEADACHE, FULLNESS IN HEAD: NOT PRESENT
ANXIETY: 2
NAUSEA AND VOMITING: 2
AGITATION: NORMAL ACTIVITY
HEADACHE, FULLNESS IN HEAD: NOT PRESENT
NAUSEA AND VOMITING: MILD NAUSEA WITH NO VOMITING
HEADACHE, FULLNESS IN HEAD: MILD
TREMOR: NO TREMOR
VISUAL DISTURBANCES: NOT PRESENT
AGITATION: SOMEWHAT MORE THAN NORMAL ACTIVITY
PAROXYSMAL SWEATS: 2
TREMOR: NO TREMOR
TREMOR: NO TREMOR
AUDITORY DISTURBANCES: NOT PRESENT
VISUAL DISTURBANCES: NOT PRESENT
AUDITORY DISTURBANCES: NOT PRESENT
VISUAL DISTURBANCES: NOT PRESENT

## 2023-03-01 ASSESSMENT — ACTIVITIES OF DAILY LIVING (ADL): PRIOR_ADL: INDEPENDENT

## 2023-03-01 ASSESSMENT — GAIT ASSESSMENTS
STEP LENGTH AND HEIGHT PART 1: RIGHT FOOT PASSES LEFT STAND FOOT
STEP LENGTH AND HEIGHT PART 2: RIGHT FOOT COMPLETELY CLEARS FLOOR
BALANCE AND GAIT SCORE: 18
GAIT SCORE: 10
INITIATION OF GAIT IMMEDIATELY AFTER GO: NO HESITANCY
PATH: STRAIGHT WITHOUT WALKING AID
STEP LENGTH AND HEIGHT PART 3: LEFT FOOT PASSES RIGHT STANCE FOOT
WALKING STANCE: HEELS ALMOST TOUCHING WHILE WALKING
STEP LENGTH AND HEIGHT PART 4: LEFT FOOT COMPLETELY CLEARS FLOOR
TRUNK: MARKED SWAY OR USES WALKING AID
STEP CONTINUITY: STEPS APPEAR CONTINUOUS
STEP SYMMETRY: RIGHT AND LEFT STEP APPEAR EQUAL

## 2023-03-01 ASSESSMENT — BALANCE ASSESSMENTS
TURNING 360 DEGREES PART 2: UNSTEADY (GRABS, SWAGGERS)
IMMEDIATE STANDING BALANCE FIRST 5 SECONDS: UNSTEADY (SWAGGERS, MOVES FEET, TRUNK SWAY)
NUDGED: STAGGERS, GRABS, CATCHES SELF
TURNING 360 DEGREES PART 1: CONTINUOUS STEPS
BALANCE SCORE: 8
EYES CLOSED AT MAXIMUM POSITION NUDGED: UNSTEADY
SITTING BALANCE: STEADY, SAFE
STANDING BALANCE: UNSTEADY
ARISES: ABLE WITHOUT USING ARMS
ATTEMPTS TO ARISE: ABLE TO RISE, 1 ATTEMPT
SITTING DOWN: USES ARMS OR NOT A SMOOTH MOTION

## 2023-03-01 ASSESSMENT — PAIN SCALES - GENERAL: PAINLEVEL_OUTOF10: 0

## 2023-03-01 ASSESSMENT — ENCOUNTER SYMPTOMS: PAIN SEVERITY NOW: 0

## 2023-03-01 ASSESSMENT — COGNITIVE AND FUNCTIONAL STATUS - GENERAL
BASIC_MOBILITY_RAW_SCORE: 22
BASIC_MOBILITY_CONVERTED_SCORE: 47.40

## 2023-04-11 ENCOUNTER — TELEPHONE (OUTPATIENT)
Dept: FAMILY MEDICINE CLINIC | Facility: CLINIC | Age: 43
End: 2023-04-11

## 2023-04-11 NOTE — TELEPHONE ENCOUNTER
Clarification given to pharmacy     Per visit 4/11/23  Pt presents for follow up for relapse of alcohol use. Pt was in treatment program and was discharged to group home. Pt was placed on abilify and also had her gabapentin increase to 400mg TID. Requesting refills on gabapentin. Pt is doing well and stable.

## 2023-04-11 NOTE — TELEPHONE ENCOUNTER
Sarah calling to clarify if they are adding 100 MG to 300 MG that patient already has or is she stopping 300 MG because patient just picked up 300 Mg as refill    gabapentin 100 MG Oral Cap

## 2023-04-12 ENCOUNTER — HOSPITAL ENCOUNTER (OUTPATIENT)
Dept: CT IMAGING | Age: 43
Discharge: HOME | End: 2023-04-12
Attending: OBSTETRICS & GYNECOLOGY

## 2023-04-12 DIAGNOSIS — R92.8 ABNORMAL MAMMOGRAM: ICD-10-CM

## 2023-04-12 DIAGNOSIS — N64.89 BREAST ASYMMETRY: ICD-10-CM

## 2023-04-12 PROCEDURE — 76642 ULTRASOUND BREAST LIMITED: CPT

## 2023-04-12 PROCEDURE — G0279 TOMOSYNTHESIS, MAMMO: HCPCS

## 2023-04-18 ENCOUNTER — HOSPITAL ENCOUNTER (EMERGENCY)
Age: 43
Discharge: HOME | End: 2023-04-18
Attending: EMERGENCY MEDICINE

## 2023-04-18 VITALS
SYSTOLIC BLOOD PRESSURE: 147 MMHG | DIASTOLIC BLOOD PRESSURE: 75 MMHG | TEMPERATURE: 98.9 F | HEART RATE: 97 BPM | RESPIRATION RATE: 18 BRPM | OXYGEN SATURATION: 99 %

## 2023-04-18 DIAGNOSIS — F10.10 ALCOHOL ABUSE: Primary | ICD-10-CM

## 2023-04-18 PROCEDURE — 99283 EMERGENCY DEPT VISIT LOW MDM: CPT

## 2023-04-18 RX ORDER — ACETAMINOPHEN 325 MG/1
650 TABLET ORAL ONCE
Status: DISCONTINUED | OUTPATIENT
Start: 2023-04-18 | End: 2023-04-18 | Stop reason: HOSPADM

## 2023-04-21 ENCOUNTER — HOSPITAL ENCOUNTER (EMERGENCY)
Facility: HOSPITAL | Age: 43
Discharge: LEFT BEFORE DISCHARGE | End: 2023-04-22
Payer: MEDICAID

## 2023-07-19 ENCOUNTER — OFFICE VISIT (OUTPATIENT)
Dept: NEUROLOGY | Age: 43
End: 2023-07-19

## 2023-07-19 VITALS
SYSTOLIC BLOOD PRESSURE: 116 MMHG | BODY MASS INDEX: 28.88 KG/M2 | DIASTOLIC BLOOD PRESSURE: 79 MMHG | HEART RATE: 58 BPM | HEIGHT: 63 IN | WEIGHT: 163 LBS | RESPIRATION RATE: 14 BRPM

## 2023-07-19 DIAGNOSIS — G24.3 TORTICOLLIS, SPASMODIC: ICD-10-CM

## 2023-07-19 DIAGNOSIS — R56.9 SEIZURES (CMD): Primary | ICD-10-CM

## 2023-07-19 PROCEDURE — 99213 OFFICE O/P EST LOW 20 MIN: CPT | Performed by: PSYCHIATRY & NEUROLOGY

## 2023-07-19 PROCEDURE — 3078F DIAST BP <80 MM HG: CPT | Performed by: PSYCHIATRY & NEUROLOGY

## 2023-07-19 PROCEDURE — 3074F SYST BP LT 130 MM HG: CPT | Performed by: PSYCHIATRY & NEUROLOGY

## 2023-07-19 RX ORDER — ARIPIPRAZOLE 5 MG/1
0.5 TABLET ORAL DAILY
COMMUNITY
Start: 2023-06-29

## 2023-07-19 RX ORDER — GABAPENTIN 300 MG/1
1 CAPSULE ORAL 3 TIMES DAILY
COMMUNITY
Start: 2023-07-13

## 2023-07-19 RX ORDER — LEVETIRACETAM 500 MG/1
500 TABLET ORAL 2 TIMES DAILY
Qty: 180 TABLET | Refills: 3 | Status: SHIPPED | OUTPATIENT
Start: 2023-07-19

## 2023-07-19 RX ORDER — BENZTROPINE MESYLATE 0.5 MG/1
1 TABLET ORAL 2 TIMES DAILY
COMMUNITY
Start: 2023-06-30

## 2023-08-05 ENCOUNTER — HOSPITAL ENCOUNTER (EMERGENCY)
Age: 43
Discharge: HOME | End: 2023-08-05
Attending: EMERGENCY MEDICINE

## 2023-08-05 VITALS
DIASTOLIC BLOOD PRESSURE: 77 MMHG | OXYGEN SATURATION: 96 % | RESPIRATION RATE: 18 BRPM | TEMPERATURE: 98.2 F | SYSTOLIC BLOOD PRESSURE: 137 MMHG | HEART RATE: 99 BPM

## 2023-08-05 DIAGNOSIS — F10.920 ALCOHOLIC INTOXICATION WITHOUT COMPLICATION (CMD): Primary | ICD-10-CM

## 2023-08-05 LAB — GLUCOSE BLDC GLUCOMTR-MCNC: 110 MG/DL (ref 70–99)

## 2023-08-05 PROCEDURE — 99283 EMERGENCY DEPT VISIT LOW MDM: CPT | Performed by: EMERGENCY MEDICINE

## 2023-08-05 PROCEDURE — 10004651 HB RX, NO CHARGE ITEM: Performed by: STUDENT IN AN ORGANIZED HEALTH CARE EDUCATION/TRAINING PROGRAM

## 2023-08-05 PROCEDURE — 82962 GLUCOSE BLOOD TEST: CPT

## 2023-08-05 PROCEDURE — 99284 EMERGENCY DEPT VISIT MOD MDM: CPT

## 2023-08-05 RX ORDER — ACETAMINOPHEN 500 MG
1000 TABLET ORAL ONCE
Status: COMPLETED | OUTPATIENT
Start: 2023-08-05 | End: 2023-08-05

## 2023-08-05 RX ADMIN — ACETAMINOPHEN 1000 MG: 500 TABLET ORAL at 15:21

## 2023-08-05 ASSESSMENT — ENCOUNTER SYMPTOMS
COUGH: 0
SHORTNESS OF BREATH: 0
CHILLS: 0
DIZZINESS: 0
EYE REDNESS: 0
SORE THROAT: 0
EYE DISCHARGE: 0
CONFUSION: 0
AGITATION: 0
RHINORRHEA: 0
FEVER: 0
DIARRHEA: 0
HEADACHES: 0
VOMITING: 0
NAUSEA: 0
ABDOMINAL PAIN: 0
BACK PAIN: 0
COLOR CHANGE: 0

## 2023-08-05 ASSESSMENT — PAIN SCALES - GENERAL
PAINLEVEL_OUTOF10: 5
PAINLEVEL_OUTOF10: 0

## 2023-09-22 ENCOUNTER — TELEPHONE (OUTPATIENT)
Dept: FAMILY MEDICINE | Age: 43
End: 2023-09-22

## 2023-10-07 ENCOUNTER — HOSPITAL ENCOUNTER (EMERGENCY)
Age: 43
Discharge: HOME | End: 2023-10-07
Attending: EMERGENCY MEDICINE

## 2023-10-07 VITALS
HEART RATE: 89 BPM | SYSTOLIC BLOOD PRESSURE: 127 MMHG | RESPIRATION RATE: 18 BRPM | TEMPERATURE: 98.4 F | OXYGEN SATURATION: 99 % | DIASTOLIC BLOOD PRESSURE: 64 MMHG

## 2023-10-07 DIAGNOSIS — F32.9 CURRENT EPISODE OF MAJOR DEPRESSIVE DISORDER WITHOUT PRIOR EPISODE, UNSPECIFIED DEPRESSION EPISODE SEVERITY: ICD-10-CM

## 2023-10-07 DIAGNOSIS — F10.920 ALCOHOLIC INTOXICATION WITHOUT COMPLICATION (CMD): Primary | ICD-10-CM

## 2023-10-07 LAB
ALBUMIN SERPL-MCNC: 3.6 G/DL (ref 3.6–5.1)
ALBUMIN/GLOB SERPL: 0.9 {RATIO} (ref 1–2.4)
ALP SERPL-CCNC: 85 UNITS/L (ref 45–117)
ALT SERPL-CCNC: 14 UNITS/L
AMPHETAMINES UR QL SCN>500 NG/ML: NEGATIVE
ANION GAP SERPL CALC-SCNC: 19 MMOL/L (ref 7–19)
AST SERPL-CCNC: 19 UNITS/L
BARBITURATES UR QL SCN>200 NG/ML: NEGATIVE
BENZODIAZ UR QL SCN>200 NG/ML: NEGATIVE
BILIRUB SERPL-MCNC: 0.1 MG/DL (ref 0.2–1)
BUN SERPL-MCNC: 7 MG/DL (ref 6–20)
BUN/CREAT SERPL: 16 (ref 7–25)
BZE UR QL SCN>150 NG/ML: NEGATIVE
CALCIUM SERPL-MCNC: 8.2 MG/DL (ref 8.4–10.2)
CANNABINOIDS UR QL SCN>50 NG/ML: NEGATIVE
CHLORIDE SERPL-SCNC: 103 MMOL/L (ref 97–110)
CO2 SERPL-SCNC: 22 MMOL/L (ref 21–32)
CREAT SERPL-MCNC: 0.43 MG/DL (ref 0.51–0.95)
EGFRCR SERPLBLD CKD-EPI 2021: >90 ML/MIN/{1.73_M2}
ETHANOL SERPL-MCNC: 355 MG/DL
FASTING DURATION TIME PATIENT: ABNORMAL H
FENTANYL UR QL SCN: NEGATIVE
GLOBULIN SER-MCNC: 4.1 G/DL (ref 2–4)
GLUCOSE SERPL-MCNC: 85 MG/DL (ref 70–99)
HCG UR QL: NEGATIVE
OPIATES UR QL SCN>300 NG/ML: NEGATIVE
PCP UR QL SCN>25 NG/ML: NEGATIVE
POTASSIUM SERPL-SCNC: 3.9 MMOL/L (ref 3.4–5.1)
PROT SERPL-MCNC: 7.7 G/DL (ref 6.4–8.2)
SODIUM SERPL-SCNC: 140 MMOL/L (ref 135–145)

## 2023-10-07 PROCEDURE — H2011 CRISIS INTERVEN SVC, 15 MIN: HCPCS

## 2023-10-07 PROCEDURE — 10002803 HB RX 637: Performed by: EMERGENCY MEDICINE

## 2023-10-07 PROCEDURE — 82077 ASSAY SPEC XCP UR&BREATH IA: CPT | Performed by: STUDENT IN AN ORGANIZED HEALTH CARE EDUCATION/TRAINING PROGRAM

## 2023-10-07 PROCEDURE — 80053 COMPREHEN METABOLIC PANEL: CPT | Performed by: STUDENT IN AN ORGANIZED HEALTH CARE EDUCATION/TRAINING PROGRAM

## 2023-10-07 PROCEDURE — 99285 EMERGENCY DEPT VISIT HI MDM: CPT | Performed by: EMERGENCY MEDICINE

## 2023-10-07 PROCEDURE — 10004651 HB RX, NO CHARGE ITEM: Performed by: EMERGENCY MEDICINE

## 2023-10-07 PROCEDURE — 84703 CHORIONIC GONADOTROPIN ASSAY: CPT

## 2023-10-07 PROCEDURE — 80307 DRUG TEST PRSMV CHEM ANLYZR: CPT | Performed by: STUDENT IN AN ORGANIZED HEALTH CARE EDUCATION/TRAINING PROGRAM

## 2023-10-07 PROCEDURE — 99284 EMERGENCY DEPT VISIT MOD MDM: CPT

## 2023-10-07 RX ORDER — LORAZEPAM 1 MG/1
1 TABLET ORAL ONCE
Status: COMPLETED | OUTPATIENT
Start: 2023-10-07 | End: 2023-10-07

## 2023-10-07 RX ORDER — LEVETIRACETAM 500 MG/1
500 TABLET ORAL ONCE
Status: COMPLETED | OUTPATIENT
Start: 2023-10-07 | End: 2023-10-07

## 2023-10-07 RX ORDER — ACETAMINOPHEN 325 MG/1
650 TABLET ORAL ONCE
Status: COMPLETED | OUTPATIENT
Start: 2023-10-07 | End: 2023-10-07

## 2023-10-07 RX ADMIN — ACETAMINOPHEN 650 MG: 325 TABLET ORAL at 18:33

## 2023-10-07 RX ADMIN — LEVETIRACETAM 500 MG: 500 TABLET, FILM COATED ORAL at 13:38

## 2023-10-07 RX ADMIN — LORAZEPAM 1 MG: 1 TABLET ORAL at 20:10

## 2023-10-07 SDOH — ECONOMIC STABILITY: GENERAL

## 2023-10-07 SDOH — ECONOMIC STABILITY: FOOD INSECURITY: HOW OFTEN IN THE PAST 12 MONTHS WERE YOU WORRIED OR STRESSED ABOUT HAVING ENOUGH MONEY TO BUY NUTRITIOUS MEALS?: NEVER

## 2023-10-07 SDOH — SOCIAL STABILITY: SOCIAL NETWORK
HOW OFTEN DO YOU SEE OR TALK TO PEOPLE THAT YOU CARE ABOUT AND FEEL CLOSE TO? (FOR EXAMPLE: TALKING TO FRIENDS ON THE PHONE, VISITING FRIENDS OR FAMILY, GOING TO CHURCH OR CLUB MEETINGS): 5 OR MORE TIMES A WEEK

## 2023-10-07 SDOH — ECONOMIC STABILITY: HOUSING INSECURITY: ARE YOU WORRIED ABOUT LOSING YOUR HOUSING?: NO

## 2023-10-07 SDOH — ECONOMIC STABILITY: TRANSPORTATION INSECURITY
IN THE PAST 12 MONTHS, HAS THE LACK OF TRANSPORTATION KEPT YOU FROM MEDICAL APPOINTMENTS OR FROM GETTING MEDICATIONS?: NO

## 2023-10-07 SDOH — ECONOMIC STABILITY: TRANSPORTATION INSECURITY
IN THE PAST 12 MONTHS, HAS LACK OF RELIABLE TRANSPORTATION KEPT YOU FROM MEDICAL APPOINTMENTS, MEETINGS, WORK OR FROM GETTING THINGS NEEDED FOR DAILY LIVING?: NO

## 2023-10-07 ASSESSMENT — COGNITIVE AND FUNCTIONAL STATUS - GENERAL
LEVEL_OF_CONSCIOUSNESS_CALCULATED: ALERT
SPEECH: CLEAR/UNDERSTANDABLE
MOOD: UNREMARKABLE
BEHAVIOR: APPROPRIATE TO SITUATION
ATTENTION_CALCULATED: MAINTAINS ATTENTION
MOTOR_BEHAVIOR-RETARDATION_CALCULATED: CALM AND PURPOSEFUL
PERCEPTUAL_MISINTERPRETATIONS_HALLUCINATIONS: CLEAR REALITY BASED PERCEPTIONS
AFFECT: APPROPRIATE TO SITUATION
MOTOR_BEHAVIOR-AGITATION_CALCULATED: CALM AND PURPOSEFUL
MEMORY: INTACT
ORIENTATION: ORIENTED TO PERSON;ORIENTED TO PLACE;ORIENTED TO TIME

## 2023-10-07 ASSESSMENT — PAIN SCALES - GENERAL: PAINLEVEL_OUTOF10: 0

## 2023-10-12 ENCOUNTER — HOSPITAL ENCOUNTER (EMERGENCY)
Age: 43
Discharge: HOME | End: 2023-10-12
Attending: EMERGENCY MEDICINE

## 2023-10-12 VITALS
HEART RATE: 85 BPM | RESPIRATION RATE: 18 BRPM | SYSTOLIC BLOOD PRESSURE: 116 MMHG | OXYGEN SATURATION: 97 % | DIASTOLIC BLOOD PRESSURE: 76 MMHG | TEMPERATURE: 98.8 F

## 2023-10-12 DIAGNOSIS — F10.920 ALCOHOLIC INTOXICATION WITHOUT COMPLICATION (CMD): Primary | ICD-10-CM

## 2023-10-12 PROCEDURE — 99284 EMERGENCY DEPT VISIT MOD MDM: CPT

## 2023-10-12 PROCEDURE — 99282 EMERGENCY DEPT VISIT SF MDM: CPT | Performed by: EMERGENCY MEDICINE

## 2023-10-12 ASSESSMENT — PAIN SCALES - GENERAL: PAINLEVEL_OUTOF10: 0

## 2023-10-13 ENCOUNTER — HOSPITAL ENCOUNTER (EMERGENCY)
Age: 43
Discharge: HOME | End: 2023-10-14
Attending: EMERGENCY MEDICINE

## 2023-10-13 DIAGNOSIS — F10.920 ACUTE ALCOHOLIC INTOXICATION WITHOUT COMPLICATION (CMD): Primary | ICD-10-CM

## 2023-10-13 PROCEDURE — 99284 EMERGENCY DEPT VISIT MOD MDM: CPT | Performed by: EMERGENCY MEDICINE

## 2023-10-13 PROCEDURE — 99284 EMERGENCY DEPT VISIT MOD MDM: CPT

## 2023-10-13 ASSESSMENT — LIFESTYLE VARIABLES
AUDITORY DISTURBANCES: NOT PRESENT
ANXIETY: NO ANXIETY, AT EASE
NAUSEA AND VOMITING: NO NAUSEA AND NO VOMITING
AGITATION: NORMAL ACTIVITY
TREMOR: NO TREMOR
PAROXYSMAL SWEATS: NO SWEAT VISIBLE
VISUAL DISTURBANCES: NOT PRESENT
HEADACHE, FULLNESS IN HEAD: NOT PRESENT

## 2023-10-14 ENCOUNTER — HOSPITAL ENCOUNTER (EMERGENCY)
Age: 43
Discharge: HOME | End: 2023-10-14
Attending: EMERGENCY MEDICINE

## 2023-10-14 VITALS
SYSTOLIC BLOOD PRESSURE: 132 MMHG | RESPIRATION RATE: 18 BRPM | TEMPERATURE: 97.7 F | DIASTOLIC BLOOD PRESSURE: 91 MMHG | HEART RATE: 78 BPM | OXYGEN SATURATION: 99 %

## 2023-10-14 VITALS
OXYGEN SATURATION: 97 % | TEMPERATURE: 98.4 F | HEART RATE: 95 BPM | RESPIRATION RATE: 14 BRPM | DIASTOLIC BLOOD PRESSURE: 87 MMHG | SYSTOLIC BLOOD PRESSURE: 127 MMHG

## 2023-10-14 DIAGNOSIS — F10.10 ALCOHOL ABUSE: Primary | ICD-10-CM

## 2023-10-14 LAB — GLUCOSE BLDC GLUCOMTR-MCNC: 88 MG/DL (ref 70–99)

## 2023-10-14 PROCEDURE — 82962 GLUCOSE BLOOD TEST: CPT

## 2023-10-14 PROCEDURE — 99284 EMERGENCY DEPT VISIT MOD MDM: CPT

## 2023-10-14 PROCEDURE — 99284 EMERGENCY DEPT VISIT MOD MDM: CPT | Performed by: EMERGENCY MEDICINE

## 2023-10-14 ASSESSMENT — PAIN SCALES - GENERAL
PAINLEVEL_OUTOF10: 0

## 2023-10-14 ASSESSMENT — PAIN SCALES - WONG BAKER: WONGBAKER_NUMERICALRESPONSE: 3

## 2023-10-15 ASSESSMENT — ENCOUNTER SYMPTOMS
HEADACHES: 0
FEVER: 0
ABDOMINAL PAIN: 0
VOMITING: 0
CHILLS: 0
SHORTNESS OF BREATH: 0

## 2024-01-05 ENCOUNTER — TELEPHONE (OUTPATIENT)
Dept: FAMILY MEDICINE | Age: 44
End: 2024-01-05

## 2024-01-18 ENCOUNTER — APPOINTMENT (OUTPATIENT)
Dept: OBGYN | Age: 44
End: 2024-01-18

## 2024-01-18 ENCOUNTER — LAB SERVICES (OUTPATIENT)
Dept: LAB | Age: 44
End: 2024-01-18

## 2024-01-18 VITALS
RESPIRATION RATE: 16 BRPM | WEIGHT: 172.07 LBS | TEMPERATURE: 97.6 F | BODY MASS INDEX: 30.49 KG/M2 | HEART RATE: 70 BPM | HEIGHT: 63 IN | DIASTOLIC BLOOD PRESSURE: 71 MMHG | SYSTOLIC BLOOD PRESSURE: 113 MMHG | OXYGEN SATURATION: 98 %

## 2024-01-18 DIAGNOSIS — N63.10 BILATERAL BREAST LUMP: ICD-10-CM

## 2024-01-18 DIAGNOSIS — N63.20 BILATERAL BREAST LUMP: ICD-10-CM

## 2024-01-18 DIAGNOSIS — Z01.419 WELL WOMAN EXAM WITH ROUTINE GYNECOLOGICAL EXAM: ICD-10-CM

## 2024-01-18 DIAGNOSIS — Z01.419 WELL WOMAN EXAM WITH ROUTINE GYNECOLOGICAL EXAM: Primary | ICD-10-CM

## 2024-01-18 LAB
B-HCG UR QL: NEGATIVE
INTERNAL PROCEDURAL CONTROLS ACCEPTABLE: YES
TEST LOT EXPIRATION DATE: NORMAL
TEST LOT NUMBER: NORMAL

## 2024-01-18 PROCEDURE — 87591 N.GONORRHOEAE DNA AMP PROB: CPT | Performed by: CLINICAL MEDICAL LABORATORY

## 2024-01-18 PROCEDURE — 86803 HEPATITIS C AB TEST: CPT | Performed by: CLINICAL MEDICAL LABORATORY

## 2024-01-18 PROCEDURE — 86592 SYPHILIS TEST NON-TREP QUAL: CPT | Performed by: CLINICAL MEDICAL LABORATORY

## 2024-01-18 PROCEDURE — 87491 CHLMYD TRACH DNA AMP PROBE: CPT | Performed by: CLINICAL MEDICAL LABORATORY

## 2024-01-18 PROCEDURE — 36415 COLL VENOUS BLD VENIPUNCTURE: CPT | Performed by: OBSTETRICS & GYNECOLOGY

## 2024-01-18 PROCEDURE — 87389 HIV-1 AG W/HIV-1&-2 AB AG IA: CPT | Performed by: CLINICAL MEDICAL LABORATORY

## 2024-01-18 ASSESSMENT — PAIN SCALES - GENERAL: PAINLEVEL: 0

## 2024-01-19 ENCOUNTER — APPOINTMENT (OUTPATIENT)
Dept: NEUROLOGY | Age: 44
End: 2024-01-19

## 2024-01-19 VITALS
SYSTOLIC BLOOD PRESSURE: 110 MMHG | WEIGHT: 168 LBS | BODY MASS INDEX: 29.77 KG/M2 | HEIGHT: 63 IN | RESPIRATION RATE: 18 BRPM | DIASTOLIC BLOOD PRESSURE: 66 MMHG | HEART RATE: 55 BPM

## 2024-01-19 DIAGNOSIS — R56.9 SEIZURES (CMD): Primary | ICD-10-CM

## 2024-01-19 LAB
C TRACH RRNA UR QL NAA+PROBE: NEGATIVE
HCV AB SER QL: NEGATIVE
HIV 1+2 AB+HIV1 P24 AG SERPL QL IA: NONREACTIVE
Lab: NORMAL
N GONORRHOEA RRNA UR QL NAA+PROBE: NEGATIVE
RPR SER QL: NONREACTIVE

## 2024-01-19 PROCEDURE — 3078F DIAST BP <80 MM HG: CPT | Performed by: PSYCHIATRY & NEUROLOGY

## 2024-01-19 PROCEDURE — 3074F SYST BP LT 130 MM HG: CPT | Performed by: PSYCHIATRY & NEUROLOGY

## 2024-01-19 PROCEDURE — 99213 OFFICE O/P EST LOW 20 MIN: CPT | Performed by: PSYCHIATRY & NEUROLOGY

## 2024-01-19 RX ORDER — QUETIAPINE FUMARATE 25 MG/1
25 TABLET, FILM COATED ORAL PRN
COMMUNITY
Start: 2023-12-18

## 2024-01-19 RX ORDER — LEVETIRACETAM 500 MG/1
500 TABLET ORAL 2 TIMES DAILY
Qty: 180 TABLET | Refills: 5 | Status: SHIPPED | OUTPATIENT
Start: 2024-01-19

## 2024-01-19 RX ORDER — AMLODIPINE BESYLATE 2.5 MG/1
1 TABLET ORAL DAILY
COMMUNITY
Start: 2023-02-12 | End: 2024-05-07

## 2024-01-19 RX ORDER — HYDROXYZINE 50 MG/1
50 TABLET, FILM COATED ORAL PRN
COMMUNITY
Start: 2023-12-18

## 2024-01-19 RX ORDER — SERTRALINE HYDROCHLORIDE 150 MG/1
1 CAPSULE ORAL DAILY
COMMUNITY

## 2024-01-31 ENCOUNTER — TELEPHONE (OUTPATIENT)
Dept: FAMILY MEDICINE | Age: 44
End: 2024-01-31

## 2024-02-01 ENCOUNTER — APPOINTMENT (OUTPATIENT)
Dept: FAMILY MEDICINE | Age: 44
End: 2024-02-01

## 2024-02-01 VITALS
RESPIRATION RATE: 18 BRPM | WEIGHT: 172.18 LBS | HEART RATE: 80 BPM | SYSTOLIC BLOOD PRESSURE: 119 MMHG | TEMPERATURE: 98.2 F | HEIGHT: 63 IN | OXYGEN SATURATION: 97 % | BODY MASS INDEX: 30.51 KG/M2 | DIASTOLIC BLOOD PRESSURE: 77 MMHG

## 2024-02-01 DIAGNOSIS — F41.9 ANXIETY: Primary | ICD-10-CM

## 2024-02-01 DIAGNOSIS — F10.10 ALCOHOL ABUSE: ICD-10-CM

## 2024-02-01 DIAGNOSIS — F32.5 MAJOR DEPRESSIVE DISORDER WITH SINGLE EPISODE, IN FULL REMISSION (CMD): ICD-10-CM

## 2024-02-01 PROBLEM — S42.301A RIGHT HUMERAL FRACTURE: Status: ACTIVE | Noted: 2022-02-08

## 2024-02-01 PROBLEM — R45.851 SUICIDAL IDEATION: Status: ACTIVE | Noted: 2020-01-21

## 2024-02-01 PROBLEM — M80.00XA OSTEOPOROSIS WITH CURRENT PATHOLOGICAL FRACTURE: Status: ACTIVE | Noted: 2022-02-11

## 2024-02-01 PROBLEM — S52.531A CLOSED COLLES' FRACTURE OF RIGHT RADIUS: Status: ACTIVE | Noted: 2023-09-28

## 2024-02-01 PROBLEM — F10.229: Status: RESOLVED | Noted: 2021-01-05 | Resolved: 2024-02-01

## 2024-02-01 PROBLEM — D75.89 MACROCYTOSIS WITHOUT ANEMIA: Status: ACTIVE | Noted: 2020-12-18

## 2024-02-01 PROBLEM — F41.1 GENERALIZED ANXIETY DISORDER: Status: ACTIVE | Noted: 2018-10-13

## 2024-02-01 PROBLEM — Z87.898 HISTORY OF SEIZURE: Status: ACTIVE | Noted: 2021-01-04

## 2024-02-01 PROBLEM — L72.3 SEBACEOUS CYST: Status: ACTIVE | Noted: 2017-06-16

## 2024-02-01 RX ORDER — ARIPIPRAZOLE 5 MG/1
2.5 TABLET ORAL DAILY
Qty: 45 TABLET | Refills: 0 | Status: SHIPPED | OUTPATIENT
Start: 2024-02-01

## 2024-02-01 RX ORDER — ORPHENADRINE CITRATE 100 MG/1
1 TABLET, EXTENDED RELEASE ORAL 2 TIMES DAILY
COMMUNITY
Start: 2024-01-24

## 2024-02-01 RX ORDER — SERTRALINE HYDROCHLORIDE 150 MG/1
1 CAPSULE ORAL DAILY
Qty: 90 CAPSULE | Refills: 0 | Status: SHIPPED | OUTPATIENT
Start: 2024-02-01

## 2024-02-01 ASSESSMENT — ENCOUNTER SYMPTOMS
NEUROLOGICAL NEGATIVE: 1
RESPIRATORY NEGATIVE: 1
GASTROINTESTINAL NEGATIVE: 1
EYES NEGATIVE: 1
ENDOCRINE NEGATIVE: 1
PSYCHIATRIC NEGATIVE: 1
CONSTITUTIONAL NEGATIVE: 1

## 2024-02-01 ASSESSMENT — PATIENT HEALTH QUESTIONNAIRE - PHQ9
CLINICAL INTERPRETATION OF PHQ2 SCORE: NO FURTHER SCREENING NEEDED
SUM OF ALL RESPONSES TO PHQ9 QUESTIONS 1 AND 2: 0
SUM OF ALL RESPONSES TO PHQ9 QUESTIONS 1 AND 2: 0
1. LITTLE INTEREST OR PLEASURE IN DOING THINGS: NOT AT ALL
2. FEELING DOWN, DEPRESSED OR HOPELESS: NOT AT ALL

## 2024-02-01 ASSESSMENT — PAIN SCALES - GENERAL: PAINLEVEL: 0

## 2024-02-19 ENCOUNTER — TELEPHONE (OUTPATIENT)
Dept: FAMILY MEDICINE | Age: 44
End: 2024-02-19

## 2024-02-19 DIAGNOSIS — F32.5 MAJOR DEPRESSIVE DISORDER WITH SINGLE EPISODE, IN FULL REMISSION (CMD): ICD-10-CM

## 2024-02-19 RX ORDER — SERTRALINE HYDROCHLORIDE 150 MG/1
1 CAPSULE ORAL DAILY
Qty: 90 CAPSULE | Refills: 0 | Status: SHIPPED | OUTPATIENT
Start: 2024-02-19 | End: 2024-02-21 | Stop reason: ALTCHOICE

## 2024-02-21 ENCOUNTER — TELEPHONE (OUTPATIENT)
Dept: FAMILY MEDICINE | Age: 44
End: 2024-02-21

## 2024-02-21 RX ORDER — SERTRALINE HYDROCHLORIDE 100 MG/1
150 TABLET, FILM COATED ORAL DAILY
Qty: 135 TABLET | Refills: 1 | Status: SHIPPED | OUTPATIENT
Start: 2024-02-21

## 2024-03-18 ENCOUNTER — APPOINTMENT (OUTPATIENT)
Dept: FAMILY MEDICINE | Age: 44
End: 2024-03-18

## 2024-05-03 ENCOUNTER — HOSPITAL ENCOUNTER (OUTPATIENT)
Dept: MAMMOGRAPHY | Age: 44
End: 2024-05-03
Attending: OBSTETRICS & GYNECOLOGY

## 2024-05-03 DIAGNOSIS — R92.30 DENSE BREASTS: ICD-10-CM

## 2024-05-03 DIAGNOSIS — Z01.419 WELL WOMAN EXAM WITH ROUTINE GYNECOLOGICAL EXAM: ICD-10-CM

## 2024-05-03 PROCEDURE — 77067 SCR MAMMO BI INCL CAD: CPT

## 2025-02-21 ENCOUNTER — HOSPITAL ENCOUNTER (EMERGENCY)
Age: 45
Discharge: HOME | End: 2025-02-22
Attending: EMERGENCY MEDICINE

## 2025-02-21 DIAGNOSIS — F10.920 ALCOHOLIC INTOXICATION WITHOUT COMPLICATION (CMD): Primary | ICD-10-CM

## 2025-02-21 LAB
ALBUMIN SERPL-MCNC: 4.2 G/DL (ref 3.4–5)
ALBUMIN/GLOB SERPL: 1.1 {RATIO} (ref 1–2.4)
ALP SERPL-CCNC: 79 UNITS/L (ref 45–117)
ALT SERPL-CCNC: 23 UNITS/L
AMPHETAMINES UR QL SCN>500 NG/ML: NEGATIVE
ANION GAP SERPL CALC-SCNC: 18 MMOL/L (ref 7–19)
AST SERPL-CCNC: 27 UNITS/L
BARBITURATES UR QL SCN>200 NG/ML: NEGATIVE
BASOPHILS # BLD: 0.1 K/MCL (ref 0–0.3)
BASOPHILS NFR BLD: 1 %
BENZODIAZ UR QL SCN>200 NG/ML: NEGATIVE
BILIRUB SERPL-MCNC: 0.2 MG/DL (ref 0.2–1)
BUN SERPL-MCNC: 7 MG/DL (ref 6–20)
BUN/CREAT SERPL: 14 (ref 7–25)
BZE UR QL SCN>150 NG/ML: NEGATIVE
CALCIUM SERPL-MCNC: 8.3 MG/DL (ref 8.4–10.2)
CANNABINOIDS UR QL SCN>50 NG/ML: NEGATIVE
CHLORIDE SERPL-SCNC: 108 MMOL/L (ref 97–110)
CO2 SERPL-SCNC: 23 MMOL/L (ref 21–32)
CREAT SERPL-MCNC: 0.51 MG/DL (ref 0.51–0.95)
DEPRECATED RDW RBC: 45.1 FL (ref 39–50)
EGFRCR SERPLBLD CKD-EPI 2021: >90 ML/MIN/{1.73_M2}
EOSINOPHIL # BLD: 0 K/MCL (ref 0–0.5)
EOSINOPHIL NFR BLD: 0 %
ERYTHROCYTE [DISTWIDTH] IN BLOOD: 13.5 % (ref 11–15)
ETHANOL SERPL-MCNC: 510 MG/DL
FASTING DURATION TIME PATIENT: ABNORMAL H
FENTANYL UR QL SCN: NEGATIVE
GLOBULIN SER-MCNC: 3.9 G/DL (ref 2–4)
GLUCOSE BLDC GLUCOMTR-MCNC: 96 MG/DL (ref 70–99)
GLUCOSE SERPL-MCNC: 93 MG/DL (ref 70–99)
HCT VFR BLD CALC: 42.7 % (ref 36–46.5)
HGB BLD-MCNC: 14.6 G/DL (ref 12–15.5)
IMM GRANULOCYTES # BLD AUTO: 0 K/MCL (ref 0–0.2)
IMM GRANULOCYTES # BLD: 0 %
LYMPHOCYTES # BLD: 3.2 K/MCL (ref 1–4.8)
LYMPHOCYTES NFR BLD: 28 %
MAGNESIUM SERPL-MCNC: 2.2 MG/DL (ref 1.7–2.4)
MCH RBC QN AUTO: 30.9 PG (ref 26–34)
MCHC RBC AUTO-ENTMCNC: 34.2 G/DL (ref 32–36.5)
MCV RBC AUTO: 90.5 FL (ref 78–100)
MONOCYTES # BLD: 0.4 K/MCL (ref 0.3–0.9)
MONOCYTES NFR BLD: 4 %
NEUTROPHILS # BLD: 7.6 K/MCL (ref 1.8–7.7)
NEUTROPHILS NFR BLD: 67 %
NRBC BLD MANUAL-RTO: 0 /100 WBC
OPIATES UR QL SCN>300 NG/ML: NEGATIVE
PCP UR QL SCN>25 NG/ML: NEGATIVE
PLATELET # BLD AUTO: 320 K/MCL (ref 140–450)
POTASSIUM SERPL-SCNC: 3.9 MMOL/L (ref 3.4–5.1)
PROT SERPL-MCNC: 8.1 G/DL (ref 6.4–8.2)
RAINBOW EXTRA TUBES HOLD SPECIMEN: NORMAL
RAINBOW EXTRA TUBES HOLD SPECIMEN: NORMAL
RBC # BLD: 4.72 MIL/MCL (ref 4–5.2)
SODIUM SERPL-SCNC: 145 MMOL/L (ref 135–145)
WBC # BLD: 11.5 K/MCL (ref 4.2–11)

## 2025-02-21 PROCEDURE — 80053 COMPREHEN METABOLIC PANEL: CPT

## 2025-02-21 PROCEDURE — 99284 EMERGENCY DEPT VISIT MOD MDM: CPT

## 2025-02-21 PROCEDURE — 82962 GLUCOSE BLOOD TEST: CPT

## 2025-02-21 PROCEDURE — 36415 COLL VENOUS BLD VENIPUNCTURE: CPT

## 2025-02-21 PROCEDURE — 82077 ASSAY SPEC XCP UR&BREATH IA: CPT

## 2025-02-21 PROCEDURE — 80307 DRUG TEST PRSMV CHEM ANLYZR: CPT

## 2025-02-21 PROCEDURE — 83735 ASSAY OF MAGNESIUM: CPT

## 2025-02-21 PROCEDURE — 10002803 HB RX 637

## 2025-02-21 PROCEDURE — 85025 COMPLETE CBC W/AUTO DIFF WBC: CPT

## 2025-02-21 RX ORDER — LORAZEPAM 1 MG/1
TABLET ORAL
Status: COMPLETED
Start: 2025-02-21 | End: 2025-02-21

## 2025-02-21 RX ORDER — LORAZEPAM 1 MG/1
1 TABLET ORAL ONCE
Status: COMPLETED | OUTPATIENT
Start: 2025-02-21 | End: 2025-02-21

## 2025-02-21 RX ADMIN — LORAZEPAM 1 MG: 1 TABLET ORAL at 19:06

## 2025-02-21 ASSESSMENT — PAIN SCALES - GENERAL: PAINLEVEL_OUTOF10: 0

## 2025-02-22 VITALS
OXYGEN SATURATION: 99 % | HEART RATE: 105 BPM | SYSTOLIC BLOOD PRESSURE: 145 MMHG | RESPIRATION RATE: 18 BRPM | TEMPERATURE: 99 F | DIASTOLIC BLOOD PRESSURE: 87 MMHG

## 2025-02-22 PROCEDURE — 96374 THER/PROPH/DIAG INJ IV PUSH: CPT

## 2025-02-22 PROCEDURE — 10002800 HB RX 250 W HCPCS: Performed by: EMERGENCY MEDICINE

## 2025-02-22 RX ORDER — LEVETIRACETAM 500 MG/5ML
500 INJECTION, SOLUTION, CONCENTRATE INTRAVENOUS ONCE
Status: COMPLETED | OUTPATIENT
Start: 2025-02-22 | End: 2025-02-22

## 2025-02-22 RX ADMIN — LEVETIRACETAM 500 MG: 100 INJECTION, SOLUTION INTRAVENOUS at 01:48

## 2025-02-22 ASSESSMENT — PAIN SCALES - GENERAL: PAINLEVEL_OUTOF10: 0

## 2025-03-04 ENCOUNTER — TELEPHONE (OUTPATIENT)
Dept: FAMILY MEDICINE | Age: 45
End: 2025-03-04

## 2025-03-14 ENCOUNTER — HOSPITAL ENCOUNTER (EMERGENCY)
Age: 45
Discharge: HOME | End: 2025-03-15
Attending: EMERGENCY MEDICINE

## 2025-03-14 DIAGNOSIS — F10.920 ALCOHOLIC INTOXICATION WITHOUT COMPLICATION (CMD): Primary | ICD-10-CM

## 2025-03-14 LAB
ALBUMIN SERPL-MCNC: 4.1 G/DL (ref 3.4–5)
ALBUMIN/GLOB SERPL: 1.1 {RATIO} (ref 1–2.4)
ALP SERPL-CCNC: 70 UNITS/L (ref 45–117)
ALT SERPL-CCNC: 39 UNITS/L
AMPHETAMINES UR QL SCN>500 NG/ML: NEGATIVE
ANION GAP SERPL CALC-SCNC: 29 MMOL/L (ref 7–19)
AST SERPL-CCNC: 47 UNITS/L
BARBITURATES UR QL SCN>200 NG/ML: NEGATIVE
BASOPHILS # BLD: 0.1 K/MCL (ref 0–0.3)
BASOPHILS NFR BLD: 2 %
BENZODIAZ UR QL SCN>200 NG/ML: POSITIVE
BILIRUB SERPL-MCNC: 0.4 MG/DL (ref 0.2–1)
BUN SERPL-MCNC: 6 MG/DL (ref 6–20)
BUN/CREAT SERPL: 9 (ref 7–25)
BZE UR QL SCN>150 NG/ML: NEGATIVE
CALCIUM SERPL-MCNC: 8.6 MG/DL (ref 8.4–10.2)
CANNABINOIDS UR QL SCN>50 NG/ML: NEGATIVE
CHLORIDE SERPL-SCNC: 101 MMOL/L (ref 97–110)
CO2 SERPL-SCNC: 14 MMOL/L (ref 21–32)
CREAT SERPL-MCNC: 0.68 MG/DL (ref 0.51–0.95)
DEPRECATED RDW RBC: 50.9 FL (ref 39–50)
EGFRCR SERPLBLD CKD-EPI 2021: >90 ML/MIN/{1.73_M2}
EOSINOPHIL # BLD: 0 K/MCL (ref 0–0.5)
EOSINOPHIL NFR BLD: 1 %
ERYTHROCYTE [DISTWIDTH] IN BLOOD: 15.5 % (ref 11–15)
ETHANOL SERPL-MCNC: 300 MG/DL
FASTING DURATION TIME PATIENT: ABNORMAL H
FENTANYL UR QL SCN: NEGATIVE
GLOBULIN SER-MCNC: 3.6 G/DL (ref 2–4)
GLUCOSE BLDC GLUCOMTR-MCNC: 129 MG/DL (ref 70–99)
GLUCOSE SERPL-MCNC: 106 MG/DL (ref 70–99)
HCG SERPL-ACNC: <3 MUNITS/ML
HCT VFR BLD CALC: 40.2 % (ref 36–46.5)
HGB BLD-MCNC: 14 G/DL (ref 12–15.5)
IMM GRANULOCYTES # BLD AUTO: 0 K/MCL (ref 0–0.2)
IMM GRANULOCYTES # BLD: 0 %
LYMPHOCYTES # BLD: 2.3 K/MCL (ref 1–4.8)
LYMPHOCYTES NFR BLD: 27 %
MCH RBC QN AUTO: 31.7 PG (ref 26–34)
MCHC RBC AUTO-ENTMCNC: 34.8 G/DL (ref 32–36.5)
MCV RBC AUTO: 91 FL (ref 78–100)
MONOCYTES # BLD: 0.5 K/MCL (ref 0.3–0.9)
MONOCYTES NFR BLD: 6 %
NEUTROPHILS # BLD: 5.6 K/MCL (ref 1.8–7.7)
NEUTROPHILS NFR BLD: 64 %
NRBC BLD MANUAL-RTO: 0 /100 WBC
OPIATES UR QL SCN>300 NG/ML: NEGATIVE
PCP UR QL SCN>25 NG/ML: NEGATIVE
PLATELET # BLD AUTO: 312 K/MCL (ref 140–450)
POTASSIUM SERPL-SCNC: 3.4 MMOL/L (ref 3.4–5.1)
PROT SERPL-MCNC: 7.7 G/DL (ref 6.4–8.2)
RBC # BLD: 4.42 MIL/MCL (ref 4–5.2)
SODIUM SERPL-SCNC: 141 MMOL/L (ref 135–145)
WBC # BLD: 8.7 K/MCL (ref 4.2–11)

## 2025-03-14 PROCEDURE — 10002800 HB RX 250 W HCPCS: Performed by: EMERGENCY MEDICINE

## 2025-03-14 PROCEDURE — 84702 CHORIONIC GONADOTROPIN TEST: CPT | Performed by: EMERGENCY MEDICINE

## 2025-03-14 PROCEDURE — 36415 COLL VENOUS BLD VENIPUNCTURE: CPT | Performed by: EMERGENCY MEDICINE

## 2025-03-14 PROCEDURE — 96372 THER/PROPH/DIAG INJ SC/IM: CPT | Performed by: EMERGENCY MEDICINE

## 2025-03-14 PROCEDURE — 82962 GLUCOSE BLOOD TEST: CPT

## 2025-03-14 PROCEDURE — 80307 DRUG TEST PRSMV CHEM ANLYZR: CPT | Performed by: EMERGENCY MEDICINE

## 2025-03-14 PROCEDURE — 82077 ASSAY SPEC XCP UR&BREATH IA: CPT | Performed by: EMERGENCY MEDICINE

## 2025-03-14 PROCEDURE — 85025 COMPLETE CBC W/AUTO DIFF WBC: CPT | Performed by: EMERGENCY MEDICINE

## 2025-03-14 PROCEDURE — 99284 EMERGENCY DEPT VISIT MOD MDM: CPT | Performed by: EMERGENCY MEDICINE

## 2025-03-14 PROCEDURE — 96372 THER/PROPH/DIAG INJ SC/IM: CPT

## 2025-03-14 PROCEDURE — 99282 EMERGENCY DEPT VISIT SF MDM: CPT

## 2025-03-14 PROCEDURE — 10002800 HB RX 250 W HCPCS

## 2025-03-14 PROCEDURE — 80053 COMPREHEN METABOLIC PANEL: CPT | Performed by: EMERGENCY MEDICINE

## 2025-03-14 RX ORDER — HALOPERIDOL 5 MG/ML
INJECTION INTRAMUSCULAR
Status: COMPLETED
Start: 2025-03-14 | End: 2025-03-14

## 2025-03-14 RX ORDER — HALOPERIDOL 5 MG/ML
5 INJECTION INTRAMUSCULAR ONCE
Status: COMPLETED | OUTPATIENT
Start: 2025-03-14 | End: 2025-03-14

## 2025-03-14 RX ORDER — ONDANSETRON 2 MG/ML
4 INJECTION INTRAMUSCULAR; INTRAVENOUS ONCE
Status: COMPLETED | OUTPATIENT
Start: 2025-03-14 | End: 2025-03-14

## 2025-03-14 RX ADMIN — ONDANSETRON 4 MG: 2 INJECTION INTRAMUSCULAR; INTRAVENOUS at 20:57

## 2025-03-14 RX ADMIN — HALOPERIDOL 5 MG: 5 INJECTION INTRAMUSCULAR at 20:02

## 2025-03-14 RX ADMIN — HALOPERIDOL LACTATE 5 MG: 5 INJECTION, SOLUTION INTRAMUSCULAR at 20:02

## 2025-03-14 ASSESSMENT — PAIN SCALES - GENERAL: PAINLEVEL_OUTOF10: 0

## 2025-03-15 VITALS
SYSTOLIC BLOOD PRESSURE: 135 MMHG | RESPIRATION RATE: 20 BRPM | HEART RATE: 108 BPM | DIASTOLIC BLOOD PRESSURE: 75 MMHG | OXYGEN SATURATION: 96 % | TEMPERATURE: 98.1 F

## 2025-03-15 LAB — RAINBOW EXTRA TUBES HOLD SPECIMEN: NORMAL

## 2025-03-18 ENCOUNTER — TELEPHONE (OUTPATIENT)
Dept: FAMILY MEDICINE | Age: 45
End: 2025-03-18

## 2025-03-21 ENCOUNTER — TELEPHONE (OUTPATIENT)
Dept: NEUROLOGY | Age: 45
End: 2025-03-21

## 2025-04-01 ENCOUNTER — TELEPHONE (OUTPATIENT)
Dept: FAMILY MEDICINE | Age: 45
End: 2025-04-01

## 2025-04-10 ENCOUNTER — CLINICAL ABSTRACT (OUTPATIENT)
Dept: HEALTH INFORMATION MANAGEMENT | Facility: OTHER | Age: 45
End: 2025-04-10

## 2025-04-11 ENCOUNTER — APPOINTMENT (OUTPATIENT)
Dept: NEUROLOGY | Age: 45
End: 2025-04-11

## 2025-04-15 ENCOUNTER — TELEPHONE (OUTPATIENT)
Dept: FAMILY MEDICINE | Age: 45
End: 2025-04-15

## 2025-05-15 ENCOUNTER — TELEPHONE (OUTPATIENT)
Dept: FAMILY MEDICINE | Age: 45
End: 2025-05-15

## 2025-08-19 ENCOUNTER — APPOINTMENT (OUTPATIENT)
Dept: NEUROLOGY | Age: 45
End: 2025-08-19

## 2025-08-19 VITALS
OXYGEN SATURATION: 100 % | HEIGHT: 63 IN | DIASTOLIC BLOOD PRESSURE: 80 MMHG | TEMPERATURE: 97.8 F | RESPIRATION RATE: 20 BRPM | WEIGHT: 175.04 LBS | HEART RATE: 63 BPM | BODY MASS INDEX: 31.02 KG/M2 | SYSTOLIC BLOOD PRESSURE: 121 MMHG

## 2025-08-19 DIAGNOSIS — R56.9 SEIZURES  (CMD): Primary | ICD-10-CM

## 2025-08-19 PROCEDURE — 99212 OFFICE O/P EST SF 10 MIN: CPT | Performed by: PSYCHIATRY & NEUROLOGY

## 2025-08-19 PROCEDURE — 3079F DIAST BP 80-89 MM HG: CPT | Performed by: PSYCHIATRY & NEUROLOGY

## 2025-08-19 PROCEDURE — 3074F SYST BP LT 130 MM HG: CPT | Performed by: PSYCHIATRY & NEUROLOGY

## 2025-08-19 RX ORDER — BUSPIRONE HYDROCHLORIDE 10 MG/1
10 TABLET ORAL 2 TIMES DAILY
COMMUNITY
Start: 2025-08-03

## 2025-08-19 RX ORDER — HYDROXYZINE PAMOATE 50 MG/1
50 CAPSULE ORAL 4 TIMES DAILY PRN
COMMUNITY
Start: 2025-04-04

## 2025-08-19 RX ORDER — NALTREXONE HYDROCHLORIDE 50 MG/1
50 TABLET, FILM COATED ORAL DAILY
COMMUNITY
Start: 2025-08-16

## 2025-08-19 RX ORDER — LEVETIRACETAM 500 MG/1
500 TABLET ORAL 2 TIMES DAILY
Qty: 180 TABLET | Refills: 5 | Status: SHIPPED | OUTPATIENT
Start: 2025-08-19

## 2026-08-19 ENCOUNTER — APPOINTMENT (OUTPATIENT)
Dept: NEUROLOGY | Age: 46
End: 2026-08-19

## (undated) NOTE — ED AVS SNAPSHOT
Tommie Barber   MRN: [id number]    Department:  [facility] Emergency Department   Date of Visit:  7/21/2018           Disclosure     Insurance plans vary and the physician(s) referred by the ER may not be covered by your plan.  Please contact yo tell this physician (or your personal doctor if your instructions are to return to your personal doctor) about any new or lasting problems. The primary care or specialist physician will see patients referred from the [facility] Emergency Department.  Maximilian Cain